# Patient Record
Sex: FEMALE | Race: ASIAN | Employment: OTHER | URBAN - METROPOLITAN AREA
[De-identification: names, ages, dates, MRNs, and addresses within clinical notes are randomized per-mention and may not be internally consistent; named-entity substitution may affect disease eponyms.]

---

## 2017-04-18 ENCOUNTER — APPOINTMENT (OUTPATIENT)
Dept: GENERAL RADIOLOGY | Facility: CLINIC | Age: 82
End: 2017-04-18
Attending: EMERGENCY MEDICINE
Payer: COMMERCIAL

## 2017-04-18 ENCOUNTER — HOSPITAL ENCOUNTER (EMERGENCY)
Facility: CLINIC | Age: 82
Discharge: HOME | End: 2017-04-18
Attending: EMERGENCY MEDICINE | Admitting: EMERGENCY MEDICINE
Payer: COMMERCIAL

## 2017-04-18 VITALS
HEART RATE: 70 BPM | TEMPERATURE: 97.8 F | DIASTOLIC BLOOD PRESSURE: 93 MMHG | SYSTOLIC BLOOD PRESSURE: 149 MMHG | RESPIRATION RATE: 18 BRPM | OXYGEN SATURATION: 99 %
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
## 2017-04-18 DIAGNOSIS — S20.212A CONTUSION OF RIB ON LEFT SIDE, INITIAL ENCOUNTER: ICD-10-CM

## 2017-04-18 PROCEDURE — 25000132 ZZH RX MED GY IP 250 OP 250 PS 637: Performed by: EMERGENCY MEDICINE

## 2017-04-18 PROCEDURE — 99283 EMERGENCY DEPT VISIT LOW MDM: CPT

## 2017-04-18 PROCEDURE — 71101 X-RAY EXAM UNILAT RIBS/CHEST: CPT | Mod: LT

## 2017-04-18 RX ORDER — LIDOCAINE 50 MG/G
1 PATCH TOPICAL ONCE
Status: COMPLETED | OUTPATIENT
Start: 2017-04-18 | End: 2017-04-18

## 2017-04-18 RX ORDER — HYDROCODONE BITARTRATE AND ACETAMINOPHEN 5; 325 MG/1; MG/1
1 TABLET ORAL ONCE
Status: COMPLETED | OUTPATIENT
Start: 2017-04-18 | End: 2017-04-18

## 2017-04-18 RX ORDER — HYDROCODONE BITARTRATE AND ACETAMINOPHEN 5; 325 MG/1; MG/1
1-2 TABLET ORAL EVERY 4 HOURS PRN
Qty: 15 TABLET | Refills: 0 | Status: SHIPPED | OUTPATIENT
Start: 2017-04-18 | End: 2019-01-01

## 2017-04-18 RX ADMIN — LIDOCAINE 1 PATCH: 50 PATCH CUTANEOUS at 18:16

## 2017-04-18 RX ADMIN — HYDROCODONE BITARTRATE AND ACETAMINOPHEN 1 TABLET: 5; 325 TABLET ORAL at 16:59

## 2017-04-18 ASSESSMENT — ENCOUNTER SYMPTOMS
BACK PAIN: 0
SHORTNESS OF BREATH: 0
NECK PAIN: 0

## 2017-04-18 NOTE — ED NOTES
Patient lives at [facility], EMS states that the toilet was over flowing and patient slipped on the wet floor and hit her left side of ribs on the bathtub. Hurts to take deep breaths and movementg, denies shortness of breath at this time.

## 2017-04-18 NOTE — ED NOTES
Bed: ED16  Expected date: 4/18/17  Expected time: 4:06 PM  Means of arrival: Ambulance  Comments:  Karen Canas-Room 16

## 2017-04-18 NOTE — ED PROVIDER NOTES
History     Chief Complaint:  Rib Pain      The history is provided by the patient.      Jatin Ayala is a 89 year old female who presents via EMS from her assisted living facility for evaluation of rib ain after a mechanical fall. The patient slipped on water in her bathroom this morning, striking her left ribs on the bathtub. The patient is now having left rib with with deep inspiration. She has not had anything for pain. She denies hitting her head. The patient has not had anything for pain. She does not report any other injuries from the fall. She denies any shortness of breath.     Allergies:  No known drug allergies.     Medications:    Pantoprazole Sodium (PROTONIX PO)  Losartan Potassium (COZAAR PO)  ASPIRIN PO  HYDROCHLOROTHIAZIDE PO  loratadine (CLARITIN) 10 MG tablet  acetaminophen (TYLENOL) 160 MG/5ML suspension  ACETAMINOPHEN PO  TRAMADOL HCL PO  beclomethasone (QVAR) 40 MCG/ACT Inhaler  Diclofenac Sodium (VOLTAREN PO)  calcium carbonate (TUMS) 500 MG chewable tablet     Past Medical History:    Hypertension  Diabetes   Hyperlipidemia  GERD    Past Surgical History:    No past surgical history on file.    Family History:    No family history on file.    Social History:  Marital Status:     Resident of assisted living facility    Presents to the ED with her daughter    Review of Systems   Respiratory: Negative for shortness of breath.    Musculoskeletal: Negative for back pain and neck pain.        Positive rib pain   All other systems reviewed and are negative.      Physical Exam     Patient Vitals for the past 24 hrs:   BP Temp Temp src Pulse Resp SpO2   04/18/17 1830 (!) 149/95 - - - - 97 %   04/18/17 1815 159/79 - - - - 97 %   04/18/17 1630 - - - - - 100 %   04/18/17 1626 157/75 97.8  F (36.6  C) Oral 70 18 95 %   04/18/17 1624 157/75 - - - - -      Physical Exam  Constitutional: Patient appears well-developed and well-nourished. There is mild distress.   Head: No external signs of trauma  noted.  Neck: No JVD noted  Eyes: Pupils are equal, round, and reactive to light.   Cardiovascular: Normal rate, regular rhythm and normal heart sounds.  Exam reveals no gallop and no friction rub.  No murmur heard. Normal peripheral pulses.  Pulmonary/Chest: Effort normal and breath sounds normal. No respiratory distress. Patient has no wheezes. Patient has no rales. There is left lateral chest wall pain  Abdominal: Soft. There is no tenderness.   Extremities: No edema noted  Neurological: Patient is alert and oriented to person, place, and time. No gross deficits noted.  Skin: Skin is warm and dry. There is no diaphoresis noted. No bruising noted.    Emergency Department Course     Imaging:  Radiographic findings were communicated with the patient who voiced understanding of the findings.    XR Left Ribs Unilateral with PA Chest  IMPRESSION: Unremarkable chest and left ribs.  Report per radiology.     Interventions:  (1659) Norco 5-325 mg PO  (1816) Lidoderm 5% patch    Emergency Department Course:  The patient arrived in the emergency department via EMS.  Nursing notes and vitals reviewed.  (1655) I performed an exam of the patient as documented above.    The patient was sent for a left ribs xray while in the emergency department, findings above.      (1928) Patient update. Findings and plan explained to the patient. Patient discharged home with instructions regarding supportive care, medications, and reasons to return. The importance of close follow-up was reviewed. The patient was prescribed Norco.      Impression & Plan      Medical Decision Making:  Jatin Ayala is a 89 year old female who presents to the Ed after a fall in the bathroom. She landed on her ribs but did not injure anything else. Xray of her ribs does not show any fracture. We sofia discharge the patient with a prescription for Norco. She already has Neurontin at home. The lidocaine patch that we tried in the ED did not help so we will not send her  home with that. I have encouraged the patient and daughter to ice the area, especially over the first 24 hours and after that they may alternate with heat as neccessary. anticipatory guidance given prior to discharge. The patient will also be sent home with an incentive spirometer and should follow up with her PCP.     Diagnosis:    ICD-10-CM   1. Fall, initial encounter W19.XXXA   2. Contusion of rib on left side, initial encounter S20.212A       Disposition:  Patient is discharged to home.     Discharge Medications:  New Prescriptions    HYDROCODONE-ACETAMINOPHEN (NORCO) 5-325 MG PER TABLET    Take 1-2 tablets by mouth every 4 hours as needed for moderate to severe pain         Compa Fisher  4/18/2017   [facility] EMERGENCY DEPARTMENT    I, Compa Fisher, am serving as a scribe on 4/18/2017 at 4:55 PM to personally document services performed by Dr. Troncoso based on my observations and the provider's statements to me.        Michele Troncoso,   04/18/17 8756

## 2017-04-18 NOTE — ED AVS SNAPSHOT
[facility] Emergency Department    [address] [facility] [address]    Phone:  [phone]    Fax:  [phone]                                       Jatin Ayala   MRN: [id number]    Department:  [facility] Emergency Department   Date of Visit:  4/18/2017           Patient Information     Date Of Birth          8/18/1927        Your diagnoses for this visit were:     Fall, initial encounter     Contusion of rib on left side, initial encounter        You were seen by Michele Troncoso DO.      Follow-up Information     Follow up with Juice Franco MD. Call in 2 days.    Specialty:  Internal Medicine    Why:  As needed    Contact information:    [facility]  [address] [facility] [address]  [phone]          Follow up with [facility] Emergency Department.    Specialty:  EMERGENCY MEDICINE    Why:  If symptoms worsen    Contact information:    [address] [facility] [id number] [phone]        Discharge Instructions         Discharge Instructions: Using an Incentive Spirometer  An incentive spirometer is a device that helps you do deep breathing exercises. These exercises will help you breathe better and improve the function of your lungs. The incentive spirometer provides a way for you to take an active part in your recovery.  Follow these steps to use your incentive spirometer     Inhale normally. Relax and breathe out.    Place your lips tightly around the mouthpiece.    Make sure the device is upright and not tilted.    Inhale as much air as you can through the mouthpiece (don't breathe through your nose). If you inhale too quickly, the spirometer may make a noise. If you hear this noise, inhale more slowly.    Hold your breath long enough to keep the balls (or disk) raised for at least 5 seconds.    Do this exercise every hour while you re awake or as often as your health care provider instructs.    If  you were also taught coughing exercises, perform them regularly as instructed.  Follow-up care  Make a follow-up appointment as directed by our staff.     When to seek medical care  Call your health care provider immediately if you have any of the following:    Shortness of breath that is not relieved by your medication    Chest pain    Fever above 101.0 F  (38.3 C)    Brownish, bloody, or smelly sputum    Blue lips or fingernails     4624-9889 [employer] [address] [place] [address]. All rights reserved. This information is not intended as a substitute for professional medical care. Always follow your healthcare professional's instructions.        Chest Contusion    A contusion is a bruise to the skin, muscle, or ribs. It may cause pain, tenderness, and swelling. It may turn the skin purple until it heals. Contusions take a few days to a few weeks to heal.  Home care  Follow these guidelines when caring for yourself at home:    Rest. Don t do any heavy lifting or strenuous activity. Don t do any activity that causes pain.    Put an ice pack on the injured area. Do this for 20 minutes every 1 to 2 hours the first day. You can make an ice pack by wrapping a plastic bag of ice cubes in a thin towel. Continue to use the ice pack 3 to 4 times a day for the next 2 days. Then use the ice pack as needed to ease pain and swelling.    After 1 to 2 days you may put a warm compress on the area. Do this for 10 minutes several times a day. A warm compress is a clean cloth that s damp with warm water.    Hold a pillow to the affected area when you cough. This will help ease pain.    You may use acetaminophen or ibuprofen to control pain, unless another pain medicine was prescribed. If you have chronic liver or kidney disease, talk with your health care provider before using these medicines. Also talk with your provider if you ve had a stomach ulcer or GI bleeding.  Follow-up care  Follow up with your  health care provider during the next week, or as advised.  When to seek medical advice  Call your health care provider right away if any of these occur:    Shortness of breath, difficulty breathing, or breathing fast    Chest pain gets worse when you breathe    Severe pain that comes on suddenly or lasts more than an hour    Dizziness, weakness, or fainting    New abdominal pain or abdominal pain that gets worse     Fever of 101 F (38.3 C) or higher, or as directed by your health care provider    2071-1791 [employer]. [address] [place] [address]. All rights reserved. This information is not intended as a substitute for professional medical care. Always follow your healthcare professional's instructions.          24 Hour Appointment Hotline       To make an appointment at any [facility], call [phone] ([phone]). If you don't have a family doctor or clinic, we will help you find one. [facility] clinics are conveniently located to serve the needs of you and your family.             Review of your medicines      START taking        Dose / Directions Last dose taken    HYDROcodone-acetaminophen 5-325 MG per tablet   Commonly known as:  NORCO   Dose:  1-2 tablet   Quantity:  15 tablet        Take 1-2 tablets by mouth every 4 hours as needed for moderate to severe pain   Refills:  0          Our records show that you are taking the medicines listed below. If these are incorrect, please call your family doctor or clinic.        Dose / Directions Last dose taken    * acetaminophen 160 MG/5ML suspension   Commonly known as:  TYLENOL   Dose:  15 mg/kg        Take 15 mg/kg by mouth every 6 hours as needed for fever or mild pain   Refills:  0        * ACETAMINOPHEN PO   Dose:  325 mg        Take 325 mg by mouth every 4 hours as needed for pain   Refills:  0        ASPIRIN NOT PRESCRIBED   Commonly known as:  INTENTIONAL        continuous prn for other Antiplatelet medication not  prescribed intentionally due to {CHOOSE REASON BEFORE SIGNING!!!:828503}   Refills:  0        ASPIRIN PO   Dose:  325 mg        Take 325 mg by mouth daily   Refills:  0        beclomethasone 40 MCG/ACT Inhaler   Commonly known as:  QVAR   Dose:  2 puff        Inhale 2 puffs into the lungs 2 times daily   Refills:  0        calcium carbonate 500 MG chewable tablet   Commonly known as:  TUMS   Dose:  1-2 chew tab   Quantity:  150 tablet        Take 1-2 tablets (500-1,000 mg) by mouth 3 times daily   Refills:  0        CLINDAMYCIN HCL PO   Dose:  150 mg        Take 150 mg by mouth   Refills:  0        COZAAR PO   Dose:  50 mg        Take 50 mg by mouth daily   Refills:  0        HYDROCHLOROTHIAZIDE PO   Dose:  25 mg        Take 25 mg by mouth daily   Refills:  0        loratadine 10 MG tablet   Commonly known as:  CLARITIN   Dose:  10 mg        Take 10 mg by mouth daily   Refills:  0        METRONIDAZOLE PO   Dose:  500 mg        Take 500 mg by mouth   Refills:  0        pantoprazole        Inject into the vein every 24 hours   Refills:  0        PROTONIX PO   Dose:  40 mg        Take 40 mg by mouth 2 times daily (before meals)   Refills:  0        TRAMADOL HCL PO   Dose:  50 mg        Take 50 mg by mouth   Refills:  0        VOLTAREN PO        Refills:  0        * Notice:  This list has 2 medication(s) that are the same as other medications prescribed for you. Read the directions carefully, and ask your doctor or other care provider to review them with you.            Prescriptions were sent or printed at these locations (1 Prescription)                   Other Prescriptions                Printed at Department/Unit printer (1 of 1)         HYDROcodone-acetaminophen (NORCO) 5-325 MG per tablet                Procedures and tests performed during your visit     Ribs XR, unilat 3 views + PA chest,  left      Orders Needing Specimen Collection     None      Pending Results     No orders found from 4/16/2017 to 4/19/2017.             Pending Culture Results     No orders found from 4/16/2017 to 4/19/2017.            Test Results From Your Hospital Stay        4/18/2017  7:22 PM      Narrative     CHEST AND LEFT RIBS THREE VIEWS   4/18/2017 7:09 PM    HISTORY: Left rib pain after falling.    COMPARISON: A chest radiograph and a CT of the chest on 3/31/2006.    FINDINGS: The heart size is normal. No mediastinal pathology is seen.  Again seen is a small granuloma of the left lung base. The lungs are  otherwise clear. The pulmonary vasculature is normal. No pneumothorax  or pleural effusion is seen. No rib fracture or other chest wall  pathology is seen.        Impression     IMPRESSION: Unremarkable chest and left ribs.    EDWARD GASPAR MD                Clinical Quality Measure: Blood Pressure Screening     Your blood pressure was checked while you were in the emergency department today. The last reading we obtained was  BP: (!) 149/95 . Please read the guidelines below about what these numbers mean and what you should do about them.  If your systolic blood pressure (the top number) is less than 120 and your diastolic blood pressure (the bottom number) is less than 80, then your blood pressure is normal. There is nothing more that you need to do about it.  If your systolic blood pressure (the top number) is 120-139 or your diastolic blood pressure (the bottom number) is 80-89, your blood pressure may be higher than it should be. You should have your blood pressure rechecked within a year by a primary care provider.  If your systolic blood pressure (the top number) is 140 or greater or your diastolic blood pressure (the bottom number) is 90 or greater, you may have high blood pressure. High blood pressure is treatable, but if left untreated over time it can put you at risk for heart attack, stroke, or kidney failure. You should have your blood pressure rechecked by a primary care provider within the next 4 weeks.  If your provider  "in the emergency department today gave you specific instructions to follow-up with your doctor or provider even sooner than that, you should follow that instruction and not wait for up to 4 weeks for your follow-up visit.        Thank you for choosing [facility]       Thank you for choosing [facility] for your care. Our goal is always to provide you with excellent care. Hearing back from our patients is one way we can continue to improve our services. Please take a few minutes to complete the written survey that you may receive in the mail after you visit with us. Thank you!        [employer] Information     [employer] lets you send messages to your doctor, view your test results, renew your prescriptions, schedule appointments and more. To sign up, go to [email or web address] . Click on \"Log in\" on the left side of the screen, which will take you to the Welcome page. Then click on \"Sign up Now\" on the right side of the page.     You will be asked to enter the access code listed below, as well as some personal information. Please follow the directions to create your username and password.     Your access code is: [email or web address]  Expires: 2017  7:41 PM     Your access code will  in 90 days. If you need help or a new code, please call your [facility] clinic or [phone].        Care EveryWhere ID     This is your Care EveryWhere ID. This could be used by other organizations to access your [facility] medical records  [id number]        After Visit Summary       This is your record. Keep this with you and show to your community pharmacist(s) and doctor(s) at your next visit.                  "

## 2017-04-18 NOTE — ED AVS SNAPSHOT
[facility] Emergency Department    [address]    [facility] [address]    Phone:  [phone]    Fax:  [phone]                                       Jatin Ayala   MRN: [id number]    Department:  [facility] Emergency Department   Date of Visit:  4/18/2017           After Visit Summary Signature Page     I have received my discharge instructions, and my questions have been answered. I have discussed any challenges I see with this plan with the nurse or doctor.    ..........................................................................................................................................  Patient/Patient Representative Signature      ..........................................................................................................................................  Patient Representative Print Name and Relationship to Patient    ..................................................               ................................................  Date                                            Time    ..........................................................................................................................................  Reviewed by Signature/Title    ...................................................              ..............................................  Date                                                            Time

## 2017-04-19 NOTE — DISCHARGE INSTRUCTIONS
Discharge Instructions: Using an Incentive Spirometer  An incentive spirometer is a device that helps you do deep breathing exercises. These exercises will help you breathe better and improve the function of your lungs. The incentive spirometer provides a way for you to take an active part in your recovery.  Follow these steps to use your incentive spirometer     Inhale normally. Relax and breathe out.    Place your lips tightly around the mouthpiece.    Make sure the device is upright and not tilted.    Inhale as much air as you can through the mouthpiece (don't breathe through your nose). If you inhale too quickly, the spirometer may make a noise. If you hear this noise, inhale more slowly.    Hold your breath long enough to keep the balls (or disk) raised for at least 5 seconds.    Do this exercise every hour while you re awake or as often as your health care provider instructs.    If you were also taught coughing exercises, perform them regularly as instructed.  Follow-up care  Make a follow-up appointment as directed by our staff.     When to seek medical care  Call your health care provider immediately if you have any of the following:    Shortness of breath that is not relieved by your medication    Chest pain    Fever above 101.0 F  (38.3 C)    Brownish, bloody, or smelly sputum    Blue lips or fingernails     7006-3401 [employer]. [address] [place] [address], [place] [id number]. All rights reserved. This information is not intended as a substitute for professional medical care. Always follow your healthcare professional's instructions.        Chest Contusion    A contusion is a bruise to the skin, muscle, or ribs. It may cause pain, tenderness, and swelling. It may turn the skin purple until it heals. Contusions take a few days to a few weeks to heal.  Home care  Follow these guidelines when caring for yourself at home:    Rest. Don t do any heavy lifting or strenuous activity. Don t do any  activity that causes pain.    Put an ice pack on the injured area. Do this for 20 minutes every 1 to 2 hours the first day. You can make an ice pack by wrapping a plastic bag of ice cubes in a thin towel. Continue to use the ice pack 3 to 4 times a day for the next 2 days. Then use the ice pack as needed to ease pain and swelling.    After 1 to 2 days you may put a warm compress on the area. Do this for 10 minutes several times a day. A warm compress is a clean cloth that s damp with warm water.    Hold a pillow to the affected area when you cough. This will help ease pain.    You may use acetaminophen or ibuprofen to control pain, unless another pain medicine was prescribed. If you have chronic liver or kidney disease, talk with your health care provider before using these medicines. Also talk with your provider if you ve had a stomach ulcer or GI bleeding.  Follow-up care  Follow up with your health care provider during the next week, or as advised.  When to seek medical advice  Call your health care provider right away if any of these occur:    Shortness of breath, difficulty breathing, or breathing fast    Chest pain gets worse when you breathe    Severe pain that comes on suddenly or lasts more than an hour    Dizziness, weakness, or fainting    New abdominal pain or abdominal pain that gets worse     Fever of 101 F (38.3 C) or higher, or as directed by your health care provider    7943-9704 [employer]. [address] [place] [address]. All rights reserved. This information is not intended as a substitute for professional medical care. Always follow your healthcare professional's instructions.

## 2019-01-01 ENCOUNTER — TELEPHONE (OUTPATIENT)
Dept: FAMILY MEDICINE | Facility: CLINIC | Age: 84
End: 2019-01-01

## 2019-01-01 ENCOUNTER — OFFICE VISIT (OUTPATIENT)
Dept: OPHTHALMOLOGY | Facility: CLINIC | Age: 84
End: 2019-01-01
Payer: COMMERCIAL

## 2019-01-01 ENCOUNTER — ANCILLARY PROCEDURE (OUTPATIENT)
Dept: GENERAL RADIOLOGY | Facility: CLINIC | Age: 84
End: 2019-01-01
Attending: FAMILY MEDICINE
Payer: COMMERCIAL

## 2019-01-01 ENCOUNTER — OFFICE VISIT (OUTPATIENT)
Dept: ORTHOPEDICS | Facility: CLINIC | Age: 84
End: 2019-01-01
Attending: FAMILY MEDICINE
Payer: COMMERCIAL

## 2019-01-01 ENCOUNTER — ANCILLARY PROCEDURE (OUTPATIENT)
Dept: GENERAL RADIOLOGY | Facility: CLINIC | Age: 84
End: 2019-01-01
Attending: PODIATRIST
Payer: COMMERCIAL

## 2019-01-01 ENCOUNTER — OFFICE VISIT (OUTPATIENT)
Dept: FAMILY MEDICINE | Facility: CLINIC | Age: 84
End: 2019-01-01
Payer: COMMERCIAL

## 2019-01-01 ENCOUNTER — TELEPHONE (OUTPATIENT)
Dept: FAMILY MEDICINE | Facility: CLINIC | Age: 84
End: 2019-01-01
Payer: COMMERCIAL

## 2019-01-01 ENCOUNTER — PRE VISIT (OUTPATIENT)
Dept: ORTHOPEDICS | Facility: CLINIC | Age: 84
End: 2019-01-01

## 2019-01-01 VITALS
OXYGEN SATURATION: 100 % | RESPIRATION RATE: 16 BRPM | BODY MASS INDEX: 20.06 KG/M2 | SYSTOLIC BLOOD PRESSURE: 158 MMHG | DIASTOLIC BLOOD PRESSURE: 88 MMHG | TEMPERATURE: 97.5 F | HEIGHT: 57 IN | WEIGHT: 93 LBS | HEART RATE: 88 BPM

## 2019-01-01 VITALS
HEIGHT: 57 IN | BODY MASS INDEX: 21.45 KG/M2 | DIASTOLIC BLOOD PRESSURE: 78 MMHG | RESPIRATION RATE: 16 BRPM | HEART RATE: 89 BPM | TEMPERATURE: 98.4 F | WEIGHT: 99.4 LBS | OXYGEN SATURATION: 100 % | SYSTOLIC BLOOD PRESSURE: 137 MMHG

## 2019-01-01 VITALS
OXYGEN SATURATION: 97 % | RESPIRATION RATE: 16 BRPM | TEMPERATURE: 98 F | SYSTOLIC BLOOD PRESSURE: 135 MMHG | DIASTOLIC BLOOD PRESSURE: 74 MMHG | HEART RATE: 75 BPM | BODY MASS INDEX: 19.71 KG/M2 | WEIGHT: 92.6 LBS

## 2019-01-01 DIAGNOSIS — I10 ESSENTIAL HYPERTENSION: Primary | ICD-10-CM

## 2019-01-01 DIAGNOSIS — M25.512 ACUTE PAIN OF LEFT SHOULDER: Primary | ICD-10-CM

## 2019-01-01 DIAGNOSIS — F33.0 MILD RECURRENT MAJOR DEPRESSION (H): ICD-10-CM

## 2019-01-01 DIAGNOSIS — L97.523: Primary | ICD-10-CM

## 2019-01-01 DIAGNOSIS — H01.01A ULCERATIVE BLEPHARITIS OF UPPER AND LOWER EYELIDS OF BOTH EYES: ICD-10-CM

## 2019-01-01 DIAGNOSIS — M54.50 CHRONIC BILATERAL LOW BACK PAIN WITHOUT SCIATICA: Primary | ICD-10-CM

## 2019-01-01 DIAGNOSIS — N18.30 CKD (CHRONIC KIDNEY DISEASE) STAGE 3, GFR 30-59 ML/MIN (H): ICD-10-CM

## 2019-01-01 DIAGNOSIS — M25.561 CHRONIC PAIN OF BOTH KNEES: ICD-10-CM

## 2019-01-01 DIAGNOSIS — H01.01B ULCERATIVE BLEPHARITIS OF UPPER AND LOWER EYELIDS OF BOTH EYES: ICD-10-CM

## 2019-01-01 DIAGNOSIS — L97.523: ICD-10-CM

## 2019-01-01 DIAGNOSIS — M21.612 BUNION, LEFT: ICD-10-CM

## 2019-01-01 DIAGNOSIS — G89.29 CHRONIC PAIN OF BOTH KNEES: ICD-10-CM

## 2019-01-01 DIAGNOSIS — M25.512 ACUTE PAIN OF LEFT SHOULDER: ICD-10-CM

## 2019-01-01 DIAGNOSIS — G89.29 CHRONIC BILATERAL LOW BACK PAIN WITHOUT SCIATICA: Primary | ICD-10-CM

## 2019-01-01 DIAGNOSIS — H52.13 MYOPIA OF BOTH EYES: Primary | ICD-10-CM

## 2019-01-01 DIAGNOSIS — J30.89 NON-SEASONAL ALLERGIC RHINITIS, UNSPECIFIED TRIGGER: ICD-10-CM

## 2019-01-01 DIAGNOSIS — M25.562 CHRONIC PAIN OF BOTH KNEES: ICD-10-CM

## 2019-01-01 DIAGNOSIS — K59.00 CONSTIPATION, UNSPECIFIED CONSTIPATION TYPE: ICD-10-CM

## 2019-01-01 DIAGNOSIS — H35.30 AGE-RELATED MACULAR DEGENERATION: ICD-10-CM

## 2019-01-01 DIAGNOSIS — H04.123 INSUFFICIENCY OF TEAR FILM OF BOTH EYES: ICD-10-CM

## 2019-01-01 DIAGNOSIS — Z96.1 PSEUDOPHAKIA: ICD-10-CM

## 2019-01-01 DIAGNOSIS — E61.1 IRON DEFICIENCY: ICD-10-CM

## 2019-01-01 DIAGNOSIS — K21.9 GASTROESOPHAGEAL REFLUX DISEASE WITHOUT ESOPHAGITIS: ICD-10-CM

## 2019-01-01 DIAGNOSIS — I10 ESSENTIAL HYPERTENSION: ICD-10-CM

## 2019-01-01 DIAGNOSIS — E55.9 VITAMIN D DEFICIENCY: ICD-10-CM

## 2019-01-01 LAB
ALBUMIN SERPL-MCNC: 4.6 MG/DL (ref 3.5–4.7)
ALP SERPL-CCNC: 78.4 U/L (ref 31.7–110.5)
ALT SERPL-CCNC: 9.3 U/L (ref 0–45)
AST SERPL-CCNC: 18.2 U/L (ref 0–45)
BILIRUB SERPL-MCNC: 0.6 MG/DL (ref 0.2–1.3)
BUN SERPL-MCNC: 22.2 MG/DL (ref 7–19)
CALCIUM SERPL-MCNC: 9.6 MG/DL (ref 8.5–10.1)
CHLORIDE SERPLBLD-SCNC: 96.4 MMOL/L (ref 98–110)
CO2 SERPL-SCNC: 26.5 MMOL/L (ref 20–32)
CREAT SERPL-MCNC: 0.9 MG/DL (ref 0.5–1)
DEPRECATED CALCIDIOL+CALCIFEROL SERPL-MC: 60 UG/L (ref 20–75)
GFR SERPL CREATININE-BSD FRML MDRD: 62.2 ML/MIN/1.7 M2
GLUCOSE SERPL-MCNC: 113.8 MG'DL (ref 70–99)
HCT VFR BLD AUTO: 41.1 % (ref 35–47)
HEMOGLOBIN: 12.4 G/DL (ref 11.7–15.7)
MCH RBC QN AUTO: 30.8 PG (ref 26.5–35)
MCHC RBC AUTO-ENTMCNC: 30.2 G/DL (ref 32–36)
MCV RBC AUTO: 102.2 FL (ref 78–100)
PLATELET # BLD AUTO: 297 K/UL (ref 150–450)
POTASSIUM SERPL-SCNC: 3.7 MMOL/DL (ref 3.3–4.5)
PROT SERPL-MCNC: 7.7 G/DL (ref 6.8–8.8)
RBC # BLD AUTO: 4.02 M/UL (ref 3.8–5.2)
SODIUM SERPL-SCNC: 133.4 MMOL/L (ref 132.6–141.4)
WBC # BLD AUTO: 6.6 K/UL (ref 4–11)

## 2019-01-01 RX ORDER — AMLODIPINE BESYLATE 10 MG/1
10 TABLET ORAL DAILY
COMMUNITY
Start: 2019-10-29 | End: 2019-01-01

## 2019-01-01 RX ORDER — SERTRALINE HYDROCHLORIDE 25 MG/1
25 TABLET, FILM COATED ORAL DAILY
Qty: 90 TABLET | Refills: 3 | Status: SHIPPED | OUTPATIENT
Start: 2019-01-01 | End: 2020-01-01

## 2019-01-01 RX ORDER — POLYETHYLENE GLYCOL 3350 17 G/17G
1 POWDER, FOR SOLUTION ORAL DAILY
Qty: 850 G | Refills: 3 | Status: ON HOLD | OUTPATIENT
Start: 2019-01-01 | End: 2020-01-01

## 2019-01-01 RX ORDER — FERROUS GLUCONATE 324(38)MG
1 TABLET ORAL 2 TIMES DAILY
COMMUNITY
Start: 2019-06-10 | End: 2019-01-01

## 2019-01-01 RX ORDER — ERGOCALCIFEROL 1.25 MG/1
50000 CAPSULE, LIQUID FILLED ORAL
COMMUNITY
Start: 2019-08-14 | End: 2019-01-01

## 2019-01-01 RX ORDER — LOSARTAN POTASSIUM 50 MG/1
50 TABLET ORAL DAILY
Qty: 90 TABLET | Refills: 3 | Status: ON HOLD | OUTPATIENT
Start: 2019-01-01 | End: 2020-01-01

## 2019-01-01 RX ORDER — ACETAMINOPHEN 500 MG
1000 TABLET ORAL EVERY 8 HOURS PRN
Qty: 100 TABLET | Refills: 3 | Status: SHIPPED | OUTPATIENT
Start: 2019-01-01 | End: 2020-01-01

## 2019-01-01 RX ORDER — DIPHENHYDRAMINE HCL 25 MG
CAPSULE ORAL
Qty: 30 ML | Refills: 3 | Status: SHIPPED | OUTPATIENT
Start: 2019-01-01 | End: 2020-01-01

## 2019-01-01 RX ORDER — TRIAMCINOLONE ACETONIDE 1 MG/G
CREAM TOPICAL
COMMUNITY
Start: 2018-03-16 | End: 2019-01-01

## 2019-01-01 RX ORDER — POLYETHYLENE GLYCOL 3350 17 G/17G
POWDER, FOR SOLUTION ORAL
COMMUNITY
Start: 2018-10-04 | End: 2019-01-01

## 2019-01-01 RX ORDER — CICLOPIROX 80 MG/ML
SOLUTION TOPICAL
COMMUNITY
Start: 2017-12-28 | End: 2019-01-01

## 2019-01-01 RX ORDER — LANSOPRAZOLE 30 MG/1
30 CAPSULE, DELAYED RELEASE ORAL DAILY
Qty: 90 CAPSULE | Refills: 3 | Status: ON HOLD | OUTPATIENT
Start: 2019-01-01 | End: 2020-01-01

## 2019-01-01 RX ORDER — FAMOTIDINE 40 MG/1
40 TABLET, FILM COATED ORAL DAILY
Qty: 90 TABLET | Refills: 3 | Status: SHIPPED | OUTPATIENT
Start: 2019-01-01 | End: 2020-01-01

## 2019-01-01 RX ORDER — DIPHENHYDRAMINE HCL 25 MG
CAPSULE ORAL
Refills: 8 | COMMUNITY
Start: 2019-07-30 | End: 2019-01-01

## 2019-01-01 RX ORDER — ANTIOX #8/OM3/DHA/EPA/LUT/ZEAX 250-2.5 MG
1 CAPSULE ORAL 2 TIMES DAILY
Qty: 120 CAPSULE | Refills: 11 | Status: ON HOLD | OUTPATIENT
Start: 2019-01-01 | End: 2020-01-01

## 2019-01-01 RX ORDER — BETAMETHASONE VALERATE 1.2 MG/G
CREAM TOPICAL
Refills: 1 | COMMUNITY
Start: 2019-01-16 | End: 2019-01-01

## 2019-01-01 RX ORDER — LORATADINE 10 MG/1
10 TABLET ORAL DAILY
Qty: 90 TABLET | Refills: 3 | Status: ON HOLD | OUTPATIENT
Start: 2019-01-01 | End: 2020-01-01

## 2019-01-01 RX ORDER — SERTRALINE HYDROCHLORIDE 25 MG/1
25 TABLET, FILM COATED ORAL DAILY
Refills: 0 | COMMUNITY
Start: 2019-01-01 | End: 2019-01-01

## 2019-01-01 RX ORDER — AMLODIPINE BESYLATE 10 MG/1
10 TABLET ORAL EVERY EVENING
Qty: 90 TABLET | Refills: 3 | Status: ON HOLD | OUTPATIENT
Start: 2019-01-01 | End: 2020-01-01

## 2019-01-01 RX ORDER — HYDROCHLOROTHIAZIDE 25 MG/1
25 TABLET ORAL DAILY
Qty: 90 TABLET | Refills: 3 | Status: ON HOLD | OUTPATIENT
Start: 2019-01-01 | End: 2020-01-01

## 2019-01-01 RX ORDER — LANSOPRAZOLE 30 MG/1
30 CAPSULE, DELAYED RELEASE ORAL DAILY
Refills: 1 | COMMUNITY
Start: 2019-10-18 | End: 2019-01-01

## 2019-01-01 ASSESSMENT — VISUAL ACUITY
OS_CC: 20/30
CORRECTION_TYPE: GLASSES
OD_CC: J3
OS_CC: J3
OD_CC: 20/40
OS_CC+: -1
METHOD: SNELLEN - LINEAR
OD_CC+: -1

## 2019-01-01 ASSESSMENT — ENCOUNTER SYMPTOMS
NUMBNESS: 0
WEAKNESS: 1
PARALYSIS: 0
HEADACHES: 0
STIFFNESS: 0
SPEECH CHANGE: 0
BACK PAIN: 1
DISTURBANCES IN COORDINATION: 1
MEMORY LOSS: 1
DIZZINESS: 1
ARTHRALGIAS: 1
NECK PAIN: 0
TINGLING: 0
TREMORS: 0
MUSCLE WEAKNESS: 1
SEIZURES: 0
MUSCLE CRAMPS: 1
LOSS OF CONSCIOUSNESS: 0
MYALGIAS: 1
JOINT SWELLING: 1

## 2019-01-01 ASSESSMENT — REFRACTION_MANIFEST
OS_CYLINDER: SPHERE
OD_CYLINDER: +0.50
OS_SPHERE: -1.00
OS_ADD: +3.00
OD_ADD: +3.00
OD_SPHERE: -1.75
OD_AXIS: 167

## 2019-01-01 ASSESSMENT — REFRACTION_WEARINGRX
OS_ADD: +3.00
OS_AXIS: 175
OS_CYLINDER: +0.50
OS_SPHERE: -1.50
OD_CYLINDER: +0.50
OD_ADD: +3.00
OD_SPHERE: -1.75
OD_AXIS: 180
SPECS_TYPE: BIFOCAL

## 2019-01-01 ASSESSMENT — TONOMETRY
OS_IOP_MMHG: 8
IOP_METHOD: ICARE
OD_IOP_MMHG: 9

## 2019-01-01 ASSESSMENT — CONF VISUAL FIELD
OS_NORMAL: 1
OD_NORMAL: 1
METHOD: COUNTING FINGERS

## 2019-01-01 ASSESSMENT — CUP TO DISC RATIO
OS_RATIO: 0.6
OD_RATIO: 0.6

## 2019-01-01 ASSESSMENT — MIFFLIN-ST. JEOR
SCORE: 707.11
SCORE: 742.38

## 2019-01-01 ASSESSMENT — EXTERNAL EXAM - LEFT EYE: OS_EXAM: NORMAL

## 2019-01-01 ASSESSMENT — PATIENT HEALTH QUESTIONNAIRE - PHQ9: SUM OF ALL RESPONSES TO PHQ QUESTIONS 1-9: 2

## 2019-01-01 ASSESSMENT — PAIN SCALES - GENERAL: PAINLEVEL: EXTREME PAIN (8)

## 2019-01-01 ASSESSMENT — SLIT LAMP EXAM - LIDS
COMMENTS: NORMAL
COMMENTS: NORMAL

## 2019-01-01 ASSESSMENT — EXTERNAL EXAM - RIGHT EYE: OD_EXAM: NORMAL

## 2019-11-08 PROBLEM — G89.29 CHRONIC LOW BACK PAIN WITHOUT SCIATICA: Status: ACTIVE | Noted: 2017-04-07

## 2019-11-08 PROBLEM — M54.50 CHRONIC LOW BACK PAIN WITHOUT SCIATICA: Status: ACTIVE | Noted: 2017-04-07

## 2019-11-08 NOTE — PATIENT INSTRUCTIONS
1. Follow-up with me in 1 month  2. Referral to Podiatry   3. Labs drawn today, will review results at follow-up visit

## 2019-11-08 NOTE — PROGRESS NOTES
"New Patient Note-Women         HPI       Concerns today:   1. Itching: spots on backs of hands and face. Generalized itching but then developed red spots after. No pain. Was told it was from \"too much stress\" - dry palms and tips of fingers. Has been using moisturizing \"medical\" lotion.   - patient reports having \"stones in belly\"   - reports having ulcer in stomach, denies it ever bleeding   2. Shingles vaccine  3. GERD: note from nurse at facility (Yuliana Alvares) is concerned GERD is not fully resolved with current regimen (lansoprazole 30mg daily, ranitidine 300mg daily), patient has been taking more medicine     Was in [place], moved to [place] - used to live alone, now in assisted living (has adult daughters, three, adult - one in [place] and two here in [place])   - \"has so many things in my mind\"  - \"worried a lot, depressed\"    A Polish  was used for  this visit.     Patient Active Problem List   Diagnosis     Allergic rhinitis     Anxiety state     Chronic low back pain without sciatica     Chronic pain of both knees     CKD (chronic kidney disease) stage 3, GFR 30-59 ml/min (H)     Gastro-esophageal reflux disease without esophagitis     HTN (hypertension)     Hyperlipidemia     Late effects of cerebrovascular disease     Mild recurrent major depression (H)     Osteoporosis     Primary osteoarthritis of both knees     Tear film insufficiency     Vitamin D deficiency     Past Medical History:   Diagnosis Date     Gastroesophageal reflux disease      Hypertension      Previous Medical Care   Karen Decker   acupuncture      History reviewed. No pertinent family history.         Review of Systems:     Review of Systems:  CONSTITUTIONAL: NEGATIVE for fever, chills, change in weight  INTEGUMENTARY/SKIN: NEGATIVE for current worrisome rashes, moles or lesions  EYES: NEGATIVE for vision changes or irritation  ENT/MOUTH: NEGATIVE for ear, mouth and throat problems  RESP: NEGATIVE for " "significant cough or SOB  BREAST: NEGATIVE for masses, tenderness or discharge  CV: NEGATIVE for chest pain, palpitations or peripheral edema  GI: NEGATIVE for nausea, abdominal pain, heartburn, or change in bowel habits  : NEGATIVE for frequency, dysuria, or hematuria  MUSCULOSKELETAL: NEGATIVE for significant arthralgias or myalgia  NEURO: NEGATIVE for weakness, dizziness or paresthesias  ENDOCRINE: NEGATIVE for temperature intolerance, skin/hair changes  HEME/ALLERGY: NEGATIVE for bleeding problems  PSYCHIATRIC: NEGATIVE for changes in mood or affect         Social History     Social History     Tobacco Use     Smoking status: Never Smoker     Smokeless tobacco: Never Used   Substance Use Topics     Alcohol use: Not Currently     Marital Status:  Who lives in your household? Moved to assisted living    Has anyone hurt you physically, for example by pushing, hitting, slapping or kicking you or forcing you to have sex? Denies  Do you feel threatened or controlled by a partner, ex-partner or anyone in your life? Denies    Sexual Health     Sexual concerns: No   STI History: Neg  Pregnancy History: No obstetric history on file.  LMP No LMP recorded. Patient is postmenopausal. Menopausal         Physical Exam:     Vitals: /78 (BP Location: Left arm, Patient Position: Sitting, Cuff Size: Adult Small)   Pulse 89   Temp 98.4  F (36.9  C) (Oral)   Resp 16   Ht 1.46 m (4' 9.48\")   Wt 45.1 kg (99 lb 6.4 oz)   SpO2 100%   BMI 21.15 kg/m    BMI= Body mass index is 21.15 kg/m .     Physical Exam  Vitals signs reviewed.   Constitutional:       General: She is not in acute distress.     Appearance: She is not ill-appearing, toxic-appearing or diaphoretic.      Comments: Thin   HENT:      Nose: Nose normal.      Mouth/Throat:      Mouth: Mucous membranes are moist.      Pharynx: Oropharynx is clear.   Eyes:      Conjunctiva/sclera: Conjunctivae normal.   Cardiovascular:      Rate and Rhythm: Normal rate and " regular rhythm.      Pulses: Normal pulses.   Pulmonary:      Effort: Pulmonary effort is normal.      Breath sounds: Normal breath sounds.   Abdominal:      General: Abdomen is flat. There is no distension.      Palpations: Abdomen is soft.      Tenderness: There is no abdominal tenderness.   Neurological:      Mental Status: She is alert. Mental status is at baseline.   Psychiatric:         Mood and Affect: Mood normal.         Behavior: Behavior normal.         Thought Content: Thought content normal.         Judgment: Judgment normal.         Assessment and Plan      Jatin was seen today for establish care and referral.    Diagnoses and all orders for this visit:    Chronic bilateral low back pain without sciatica  -     diclofenac (VOLTAREN) 1 % topical gel; Place 2 g onto the skin 4 times daily as needed for moderate pain Apply to knees, hips, and shoulders  -     acetaminophen (TYLENOL) 500 MG tablet; Take 2 tablets (1,000 mg) by mouth every 8 hours as needed for pain    Chronic pain of both knees  -     diclofenac (VOLTAREN) 1 % topical gel; Place 2 g onto the skin 4 times daily as needed for moderate pain Apply to knees, hips, and shoulders  -     acetaminophen (TYLENOL) 500 MG tablet; Take 2 tablets (1,000 mg) by mouth every 8 hours as needed for pain    Bunion, left  -     PODIATRY/FOOT & ANKLE SURGERY REFERRAL - INTERNAL    Vitamin D deficiency  -     Vitamin D Level    Insufficiency of tear film of both eyes  -     HYPROMELLOSE-DEXTRAN 0.3-0.1% opthalmic solution; INSTILL TWO DROPS INTO BOTH EYES EVERY 6 HOURS IF NEEDED FOR DRY EYES.    Mild recurrent major depression (H)  -     sertraline (ZOLOFT) 25 MG tablet; Take 1 tablet (25 mg) by mouth daily    Non-seasonal allergic rhinitis, unspecified trigger  -     loratadine (CLARITIN) 10 MG tablet; Take 1 tablet (10 mg) by mouth daily    Essential hypertension  -     amLODIPine (NORVASC) 10 MG tablet; Take 1 tablet (10 mg) by mouth every evening  -      hydrochlorothiazide (HYDRODIURIL) 25 MG tablet; Take 1 tablet (25 mg) by mouth daily  -     losartan (COZAAR) 50 MG tablet; Take 1 tablet (50 mg) by mouth daily    Gastroesophageal reflux disease without esophagitis  -     famotidine (PEPCID) 40 MG tablet; Take 1 tablet (40 mg) by mouth daily  -     LANsoprazole (PREVACID) 30 MG DR capsule; Take 1 capsule (30 mg) by mouth daily    Constipation, unspecified constipation type  -     polyethylene glycol (MIRALAX/GLYCOLAX) powder; Take 17 g (1 capful) by mouth daily    Iron deficiency  -     CBC with Plt ([facility])    CKD (chronic kidney disease) stage 3, GFR 30-59 ml/min (H)  -     Comprehensive Metabolic Panel ([facility])      Follow-up in 1 month  Provided referral to podiatry, given bunion and recent referral in [facility] system, but needs within [facility] at this time.  Labs drawn today - plan to review results with patient at follow-up, or if urgent will call with results.    Options for treatment and follow-up care were reviewed with the patient . Jatin Ayala  engaged in the decision making process and verbalized understanding of the options discussed and agreed with the final plan.    Fernanda Light, DO

## 2019-11-08 NOTE — NURSING NOTE
Due to patient being non-English speaking/uses sign language, an  was used for this visit. Only for face-to-face interpretation by an external agency, date and length of interpretation can be found on the scanned worksheet.     name: Shakira Ly   Agency: [employer]  Language: Telugu     Telephone number: [phone]  Type of interpretation: Face-to-face, spoken    Mary Lou Aguilar CMA on 11/8/2019 at 1:20 PM

## 2019-11-08 NOTE — PROGRESS NOTES
Preceptor Attestation:   Patient seen, evaluated and discussed with the resident. I have verified the content of the note, which accurately reflects my assessment of the patient and the plan of care.   Supervising Physician:  Mark Cummings MD.

## 2019-11-11 NOTE — TELEPHONE ENCOUNTER
Returned phone call and spoke with pharmacist (Des[facility]), per pharmacist, patient  was on different percentage of similar class of eyes drops, considering, this HYPROMELLOSE-DEXTRAN 0.3-0.1% opthalmic solution to be contemporary, since, it is the most prescription order.    Message routed to prescribing provider as Fyi or additional advisement if appropriate.    Kevin Santoyo RN

## 2019-11-11 NOTE — TELEPHONE ENCOUNTER
[facility] phone call message- medication clarification/question:    Full Medication Name: HYPROMELLOSE-DEXTRAN 0.3-0.1% opthalmic solution   Dose: INSTILL TWO DROPS INTO BOTH EYES EVERY 6 HOURS IF NEEDED FOR DRY EYES.    Question/Clarification needed: Pharmacy calling to clarify directions of medication listed above.    Pharmacy confirmed as   [id number] - [place], [place] - [address]  [address]  [address]  [facility] [address]  Phone: [phone] Fax: [phone]  : Yes    Please leave ONLY preferred pharmacy    OK to leave a message on voice mail? Yes    Advised patient that RN would call back within 3 hours, unless emergent.    Primary language: Greenlandic      needed? Yes    Call taken on November 11, 2019 at 10:12 AM by Jenifer Moreno to [facility]

## 2019-11-19 NOTE — TELEPHONE ENCOUNTER
DIAGNOSIS: Bunion LT toe - Internal referral Kuldeep GARCIA    APPOINTMENT DATE: 12/30/19    NOTES STATUS DETAILS   OFFICE NOTE from referring provider Internal Referral note 11/18/19    OFFICE NOTE from other specialist N/A    DISCHARGE SUMMARY from hospital N/A    DISCHARGE REPORT from the ER N/A    OPERATIVE REPORT N/A    MEDICATION LIST N/A    IMPLANT RECORD/STICKER N/A    LABS     CBC/DIFF Internal    CULTURES N/A    INJECTIONS DONE IN RADIOLOGY N/A    MRI N/A    CT SCAN N/A    XRAYS (IMAGES & REPORTS) N/A    TUMOR     PATHOLOGY  Slides & report N/A

## 2019-12-05 NOTE — PROGRESS NOTES
Preceptor Attestation:   Patient seen and discussed with the resident. Assessment and plan reviewed with resident and agreed upon.   Supervising Physician:  Bernard Choudhary MD  [facility]

## 2019-12-05 NOTE — PROGRESS NOTES
SAVAGE Ayala is a 89 year old  who presents for   Chief Complaint   Patient presents with     RECHECK     heartburn- medication is helping per pt.      Cough     notices a cough after eating food and has had a hard time swallowing her foot x's 2 months. Reports being seen for it in the past and nothing was wrong.      Exercises every day before bed - lots of exercises to strengthen leg and arm. Leg pain is better after doing exercises, no longer needs injection at this time.    At last visit was prescribed pepcid, which is working well. Nurse reports patient doing well. Is at facility ([facility]) and enjoys it, likes the food. 90% of self is happy, other 10% is not happy because there is no bathtub at the facility. Sleeping, eating, people - all good. Feels God found it for her.    Podiatry appointment on 12/30 - has had pain, but been doing salt soaking which is helping with the pain in the mean time. Denies any calluses or difficulty cutting toenails.    A Kazakh  was used for  this visit.    +++++++    Problem, Medication and Allergy Lists were reviewed and updated if needed..    Patient is an established patient of this clinic..         Review of Systems:   Review of Systems         Physical Exam:     Vitals:    12/05/19 1433 12/05/19 1435   BP: (!) 144/73 135/74   Pulse: 75    Resp: 16    Temp: 98  F (36.7  C)    TempSrc: Oral    SpO2: 97%    Weight: 42 kg (92 lb 9.6 oz)      Body mass index is 19.71 kg/m .  Vitals were reviewed and were normal, except initial BP elevated - on recheck is WNL     Physical Exam  Vitals signs reviewed.   Constitutional:       General: She is not in acute distress.     Appearance: She is not ill-appearing, toxic-appearing or diaphoretic.   Eyes:      Conjunctiva/sclera: Conjunctivae normal.   Cardiovascular:      Rate and Rhythm: Normal rate and regular rhythm.   Pulmonary:      Effort: Pulmonary effort is normal.      Breath sounds: Normal breath  sounds.   Neurological:      Mental Status: She is alert. Mental status is at baseline.   Psychiatric:         Mood and Affect: Mood normal.         Behavior: Behavior normal.         Thought Content: Thought content normal.         Judgment: Judgment normal.           Results:   No testing ordered today    Assessment and Plan        1. Essential hypertension  BP WNL today, recommend continuing current regimen of amlodipine 10mg daily, hydrochlorothiazide 25mg daily, losartan 50mg daily.     2. CKD (chronic kidney disease) stage 3, GFR 30-59 ml/min (H)  Reviewed recent labs with patient, Cr within normal limits at 0.9.     3. Bunion, left  Discussed with patient about need for podiatry follow-up, which is scheduled 12/30 with Dr. Carrion. Examined feet today - no significant callous or long toenails to warrant treating prior to 12/30. Recommended she continue with her bandaid barrier between her first two toes on the left while wearing shoes to prevent further damage/ulceration from bunion.    Follow-up in 3 months.     There are no discontinued medications.    Options for treatment and follow-up care were reviewed with the patient. Jatin Ayala  engaged in the decision making process and verbalized understanding of the options discussed and agreed with the final plan.    Fernanda Light, DO

## 2019-12-05 NOTE — NURSING NOTE
Due to patient being non-English speaking/uses sign language, an  was used for this visit. Only for face-to-face interpretation by an external agency, date and length of interpretation can be found on the scanned worksheet.     name: Shakira Ly   Agency: [employer]  Language: Serbian     Telephone number: [phone]  Type of interpretation: Face-to-face, spoken    Reyna Valentine CMA

## 2019-12-06 NOTE — TELEPHONE ENCOUNTER
"Request for medication refill:    Unable to fill Famotidone, currently on backorder with no release date. Please change medication to alternate if possible.    Providers if patient needs an appointment and you are willing to give a one month supply please refill for one month and  send a letter/MyChart using \".SMILLIMITEDREFILL\" .smillimited and route chart to \"P SMI \" (Giving one month refill in non controlled medications is strongly recommended before denial)    If refill has been denied, meaning absolutely no refills without visit, please complete the smart phrase \".smirxrefuse\" and route it to the \"P SMI MED REFILLS\"  pool to inform the patient and the pharmacy.    SANCHEZ SPARKS MA        "

## 2019-12-11 NOTE — NURSING NOTE
Chief Complaints and History of Present Illnesses   Patient presents with     COMPREHENSIVE EYE EXAM     Chief Complaint(s) and History of Present Illness(es)     COMPREHENSIVE EYE EXAM     Laterality: both eyes    Frequency: constantly    Course: gradually worsening    Associated symptoms: dryness    Treatments tried: artificial tears    Pain scale: 0/10              Comments     Annual exam. Pt states she cannot see well. Last eye exam 1 year ago- glasses may be older. No pain. Refresh eye drops. Pt has really dry eyes.    Jose Pollard, COT COT 1:47 PM December 11, 2019

## 2019-12-11 NOTE — Clinical Note
Thank you for the referral.Intermediate age-related macular degeneration noted in both eyes.I would like to recommend AREDs 2 multivitamin for her, but given her age and other medical issues I wanted to send you a message first before adding any systemic therapy.Please let me know if you are ok with adding this supplement.Recommended repeat evaluation in 1 year.Please contact me with any questions.Jan Allen, OD on 12/11/2019 at 2:59 PM

## 2019-12-11 NOTE — PROGRESS NOTES
History  HPI     COMPREHENSIVE EYE EXAM     In both eyes.  Occurring constantly.  Since onset it is gradually worsening.  Associated symptoms include dryness.  Treatments tried include artificial tears.  Pain was noted as 0/10.              Comments     Annual exam. Pt states she cannot see well. Last eye exam 1 year ago- glasses may be older. No pain. Refresh eye drops. Pt has really dry eyes.    FUNMILAYO [place] COT 1:47 PM December 11, 2019             Last edited by Jose Pollard COT on 12/11/2019  1:47 PM. (History)          Assessment/Plan  (H52.13) Myopia of both eyes  (primary encounter diagnosis)  Comment: Myopia with presbyopia  Plan: REFRACTION         Educated patient on condition and clinical findings. Dispensed spectacle prescription for full time wear. Educated patient on possibility of adaptation period, if symptoms do not improve return to clinic for further testing.    (Z96.1) Pseudophakia  Comment: Clear capsule, stable  Plan:  No treatment indicated at this time. Monitor annually.    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 Monitor as needed.    (H35.30) Age-related macular degeneration  Comment: Limiting vision both eyes (20/40, 20/30 BCVA); reporting poor reading vision  Plan: OCT Retina Spectralis OU (both eyes), OCCUPATIONAL THERAPY REFERRAL         Baseline OCT obtained. Referred to [facility] for Low Vision consultation. Monitor annually. Message sent to Dr. Light (PCP) regarding adding AREDS 2 supplement.    Addendum: Patient's PCP approves use of AREDs 2 supplement.    Return to clinic in 1 year for comprehensive eye exam.    Complete documentation of historical and exam elements from today's encounter can  be found in the  full encounter summary report (not reduplicated in this progress  note). I personally obtained the chief complaint(s) and history of present illness. I  confirmed and edited as necessary the review of systems, past medical/surgical  history, family history, social history, and examination findings as documented by  others; and I examined the patient myself. I personally reviewed the relevant tests,  images, and reports as documented above. I formulated and edited as necessary the  assessment and plan and discussed the findings and management plan with the  patient and family.    Jan Allen OD, FAAO

## 2019-12-30 NOTE — LETTER
12/30/2019       RE: Jatin Ayala  [address]  [facility] [address]     Dear Colleague,    Thank you for referring your patient, Jatin Ayala, to the [facility] at [facility]. Please see a copy of my visit note below.    Date of Service: 12/30/2019    Chief Complaint:   Chief Complaint   Patient presents with     Left Foot - Eval/Assessment     Consult     bunion on left foot        HPI: Jatin is a 89 year old female who presents today for further evaluation of left 2nd digit ulceration. She presents today with an  and her daughter. She relates that she has had the wound on the left medial 2nd toe for months. It started from rubbing on the big toe. She relates that it is quite painful for her. She has been putting a bandaid on the area.       PMH:   Past Medical History:   Diagnosis Date     Gastroesophageal reflux disease      Hypertension        PSxH:   Past Surgical History:   Procedure Laterality Date     CATARACT IOL, RT/LT       REPAIR PTOSIS         Allergies: Patient has no known allergies.    SH:   Social History     Socioeconomic History     Marital status:      Spouse name: Not on file     Number of children: Not on file     Years of education: Not on file     Highest education level: Not on file   Occupational History     Not on file   Social Needs     Financial resource strain: Not on file     Food insecurity:     Worry: Not on file     Inability: Not on file     Transportation needs:     Medical: Not on file     Non-medical: Not on file   Tobacco Use     Smoking status: Never Smoker     Smokeless tobacco: Never Used   Substance and Sexual Activity     Alcohol use: Not Currently     Drug use: Not Currently     Sexual activity: Not Currently   Lifestyle     Physical activity:     Days per week: Not on file     Minutes per session: Not on file     Stress: Not on file   Relationships     Social connections:     Talks on phone:  Not on file     Gets together: Not on file     Attends Episcopal service: Not on file     Active member of club or organization: Not on file     Attends meetings of clubs or organizations: Not on file     Relationship status: Not on file     Intimate partner violence:     Fear of current or ex partner: Not on file     Emotionally abused: Not on file     Physically abused: Not on file     Forced sexual activity: Not on file   Other Topics Concern     Not on file   Social History Narrative     Not on file       FH:   Family History   Problem Relation Age of Onset     Glaucoma No family hx of      Macular Degeneration No family hx of        Objective:  Data Unavailable Data Unavailable Data Unavailable Data Unavailable Data Unavailable 0 lbs 0 oz    PT and DP pulses are 1/4 bilaterally. CRT is 1 second. Absent pedal hair.   Gross sensation is intact bilaterally.   Equinus is noted bilaterally. No pain with active or passive ROM of the ankle, MTJ, 1st ray, or halluces bilaterally. HAV on the left foot with an overriding 2nd digit.   Nails normal bilaterally.         A pressure wound is noted at left  medial 2nd digit at the PIPJ measuring 0.5cm x 0.5cm x 0.2cm.    Owens Classification: 3    Wound base: Pink  White/Granulation  bone    Edges: inflamed    Drainage: slight/serous    Odor: no    Undermining: no    Bone Exposure: Yes: PIPJ    Clinical Signs of Infection: No    After obtaining patient consent, the wound was irrigated with copious amounts of saline. No sharp debridement performed today.       Assessment: Diana is a 89 YO woman with a pressure wound on the left medial 2nd toe. This does go to bone and she has some cortical irregularity, but I have less suspicion for osteomyelitis based on the appearance of the digit.     Plan:  - Pt seen and evaluated.  - XRs taken and discussed with her and her daughter.   - Needs pressure relief from the toe. She does not want an amputation.   - Hydrofera blue and bordered  gauze on the toe. Change daily. Orders written for assisted living nurses.  - See again in 2 weeks.              Again, thank you for allowing me to participate in the care of your patient.      Sincerely,    Eloy Carrion DPM

## 2019-12-30 NOTE — PROGRESS NOTES
Date of Service: 12/30/2019    Chief Complaint:   Chief Complaint   Patient presents with     Left Foot - Eval/Assessment     Consult     bunion on left foot        HPI: Jatin is a 89 year old female who presents today for further evaluation of left 2nd digit ulceration. She presents today with an  and her daughter. She relates that she has had the wound on the left medial 2nd toe for months. It started from rubbing on the big toe. She relates that it is quite painful for her. She has been putting a bandaid on the area.     Review of Systems: Answers for HPI/ROS submitted by the patient on 12/30/2019   General Symptoms: No  Skin Symptoms: No  HENT Symptoms: No  EYE SYMPTOMS: No  HEART SYMPTOMS: No  LUNG SYMPTOMS: No  INTESTINAL SYMPTOMS: No  URINARY SYMPTOMS: No  GYNECOLOGIC SYMPTOMS: No  BREAST SYMPTOMS: No  SKELETAL SYMPTOMS: Yes  BLOOD SYMPTOMS: No  NERVOUS SYSTEM SYMPTOMS: Yes  MENTAL HEALTH SYMPTOMS: No  Back pain: Yes  Muscle aches: Yes  Neck pain: No  Swollen joints: Yes  Joint pain: Yes  Bone pain: Yes  Muscle cramps: Yes  Muscle weakness: Yes  Joint stiffness: No  Bone fracture: No  Trouble with coordination: Yes  Dizziness or trouble with balance: Yes  Fainting or black-out spells: No  Memory loss: Yes  Headache: No  Seizures: No  Speech problems: No  Tingling: No  Tremor: No  Weakness: Yes  Difficulty walking: Yes  Paralysis: No  Numbness: No      PMH:   Past Medical History:   Diagnosis Date     Gastroesophageal reflux disease      Hypertension        PSxH:   Past Surgical History:   Procedure Laterality Date     CATARACT IOL, RT/LT       REPAIR PTOSIS         Allergies: Patient has no known allergies.    SH:   Social History     Socioeconomic History     Marital status:      Spouse name: Not on file     Number of children: Not on file     Years of education: Not on file     Highest education level: Not on file   Occupational History     Not on file   Social Needs     Financial resource  strain: Not on file     Food insecurity:     Worry: Not on file     Inability: Not on file     Transportation needs:     Medical: Not on file     Non-medical: Not on file   Tobacco Use     Smoking status: Never Smoker     Smokeless tobacco: Never Used   Substance and Sexual Activity     Alcohol use: Not Currently     Drug use: Not Currently     Sexual activity: Not Currently   Lifestyle     Physical activity:     Days per week: Not on file     Minutes per session: Not on file     Stress: Not on file   Relationships     Social connections:     Talks on phone: Not on file     Gets together: Not on file     Attends Restorationist service: Not on file     Active member of club or organization: Not on file     Attends meetings of clubs or organizations: Not on file     Relationship status: Not on file     Intimate partner violence:     Fear of current or ex partner: Not on file     Emotionally abused: Not on file     Physically abused: Not on file     Forced sexual activity: Not on file   Other Topics Concern     Not on file   Social History Narrative     Not on file       FH:   Family History   Problem Relation Age of Onset     Glaucoma No family hx of      Macular Degeneration No family hx of        Objective:  Data Unavailable Data Unavailable Data Unavailable Data Unavailable Data Unavailable 0 lbs 0 oz    PT and DP pulses are 1/4 bilaterally. CRT is 1 second. Absent pedal hair.   Gross sensation is intact bilaterally.   Equinus is noted bilaterally. No pain with active or passive ROM of the ankle, MTJ, 1st ray, or halluces bilaterally. HAV on the left foot with an overriding 2nd digit.   Nails normal bilaterally.         A pressure wound is noted at left  medial 2nd digit at the PIPJ measuring 0.5cm x 0.5cm x 0.2cm.    Owens Classification: 3    Wound base: Pink  White/Granulation  bone    Edges: inflamed    Drainage: slight/serous    Odor: no    Undermining: no    Bone Exposure: Yes: PIPJ    Clinical Signs of  Infection: No    After obtaining patient consent, the wound was irrigated with copious amounts of saline. No sharp debridement performed today.       Assessment: Diana is a 89 YO woman with a pressure wound on the left medial 2nd toe. This does go to bone and she has some cortical irregularity, but I have less suspicion for osteomyelitis based on the appearance of the digit.     Plan:  - Pt seen and evaluated.  - XRs taken and discussed with her and her daughter.   - Needs pressure relief from the toe. She does not want an amputation.   - Hydrofera blue and bordered gauze on the toe. Change daily. Orders written for assisted living nurses.  - See again in 2 weeks.

## 2019-12-30 NOTE — PATIENT INSTRUCTIONS
Change the dressing on the left 2nd toe daily. Cleanse with MicroKlenz. Pat dry. Apply a small amount of Iodosorb to the wound. Cover with a bordered gauze and then weave lamb's wool through her toes for extra padding.

## 2019-12-30 NOTE — NURSING NOTE
Reason For Visit:   Chief Complaint   Patient presents with     Left Foot - Eval/Assessment     Consult     bunion on left foot       There were no vitals taken for this visit.    Pain Assessment  Patient Currently in Pain: Yes  0-10 Pain Scale: 9    Yoli Valera, ATC

## 2019-12-31 NOTE — PATIENT INSTRUCTIONS
Here is the plan from today's visit    Left shoulder pain after fall  - Tylenol as needed for pain   - You may continue to use the Salonpas  - Ice/heat as able  - Gentle motion of the left shoulder/arm    Please call or return to clinic if your pain is not improving over the next 1 week.    Thank you for coming to [facility] today.    Scheduling:  If you have any concerns about today's visit or wish to schedule another appointment please call our office during normal business hours [phone] (8-5:00 M-F)  Medical Concerns:  If you have urgent medical concerns please call [phone] at any time of the day.    Crystal Domingo MD

## 2019-12-31 NOTE — PROGRESS NOTES
Diana is a 89 year old female with history of osteoporosis who presents for   Patient presents with:  Fall: Sunday 12-22 feel onto her right side/back, she was turning and lost footing, since she has been c/o left shoulder pain and back pain    Assessment and Plan      1. Acute pain of left shoulder  2. Mechanical fall  Patient with left shoulder/back pain following a mechanical fall 12/22/19. Suspect her pain is likely muscular, however, given her history of osteoporosis and worsening pain, did elect to obtain scapula x-rays today. To my read, there is no evidence of a displaced fracture. However, on the AP view there is an irregularity of the lateral scapula. Suspect this may due to her history of osteoporosis, will await radiologist's formal read. She may continue using Salonpas and Tylenol as needed for pain in the interim. Also encouraged heat/ice and gentle motion of the left shoulder/arm to prevent stiffness in the shoulder joint. Would prefer to avoid muscle relaxer, etc given her age and fall risk, but discussed with patient and her daughter that she should return to clinic if her pain is not improving over the next several days.   - XR Scapula Left; Future     Return if symptoms worsen or fail to improve.    There are no discontinued medications.    Crystal Domingo MD         HPI       Diana is a 89 year old female who presents for   Patient presents with:  Fall: Sunday 12-22 feel onto her right side/back, she was turning and lost footing, since she has been c/o right shoulder pain and back pain    Fall 9 days ago, has not been evaluated since. States she was trying to get to the bathroom quickly when she slipped and fell backwards, hitting her shoulders and back on her concrete floor. She did not hit her head or lose consciousness. Since that time has been having left shoulder pain that has been worsening. She has been using salonpas and Tylenol with some relief. No difficulty with movements of her  "hand or arm. No numbness or weakness. She has been acting normally per her daughter. No other injury.     A French  was used for  this visit.   Problem, Medication and Allergy Lists were reviewed and updated if needed.  Patient is an established patient of this clinic.  Reviewed and updated as needed this visit by clinical staff  Tobacco  Allergies  Meds  Med Hx  Surg Hx  Fam Hx  Soc Hx      Reviewed and updated as needed this visit by Provider       Health Maintenance Due   Topic Date Due     ADVANCE CARE PLANNING  08/18/1927     DEPRESSION ACTION PLAN  08/18/1927     ZOSTER IMMUNIZATION (1 of 2) 08/18/1977     MEDICARE ANNUAL WELLNESS VISIT  08/18/1992          Review of Systems:   Review of Systems  10 point review of symptoms was otherwise negative except as noted in HPI         Physical Exam:     Vitals:    12/31/19 1619 12/31/19 1629   BP: (!) 150/64 (!) 158/88   BP Location: Left arm Right arm   Patient Position: Sitting Sitting   Cuff Size: Adult Small Adult Small   Pulse: 88    Resp: 16    Temp: 97.5  F (36.4  C)    TempSrc: Oral    SpO2: 100%    Weight: 42.2 kg (93 lb)    Height: 1.45 m (4' 9.09\")      Body mass index is 20.06 kg/m .  Vitals were reviewed and were normal except for mildly elevated BP.     Physical Exam  Constitutional:       General: She is not in acute distress.     Appearance: She is well-developed. She is not diaphoretic.   Neck:      Musculoskeletal: Full passive range of motion without pain.   Cardiovascular:      Rate and Rhythm: Normal rate and regular rhythm.   Pulmonary:      Effort: Pulmonary effort is normal. No respiratory distress.   Musculoskeletal:      Right shoulder: Normal.      Left shoulder: She exhibits normal range of motion, no tenderness, no bony tenderness and no swelling.      Cervical back: She exhibits tenderness (over the lateral scapula). She exhibits no bony tenderness (of the cervical spine), no swelling and no deformity.   Neurological:     "  General: No focal deficit present.      Mental Status: She is alert.      Sensory: Sensation is intact.      Motor: Motor function is intact.      Gait: Gait abnormal (uses a walker).         Results:   No testing ordered today. Radiology read of x-ray pending.    Options for treatment and follow-up care were reviewed with the patient. Jatin Ayala  engaged in the decision making process and verbalized understanding of the options discussed and agreed with the final plan.  Crystal Domingo MD  [facility]

## 2019-12-31 NOTE — PROGRESS NOTES
Preceptor Attestation:   Patient seen, evaluated and discussed with the resident. I have verified the content of the note, which accurately reflects my assessment of the patient and the plan of care.   Supervising Physician:  Esme Torre MD

## 2020-01-01 ENCOUNTER — OFFICE VISIT (OUTPATIENT)
Dept: FAMILY MEDICINE | Facility: CLINIC | Age: 85
End: 2020-01-01
Payer: COMMERCIAL

## 2020-01-01 ENCOUNTER — APPOINTMENT (OUTPATIENT)
Dept: CARDIOLOGY | Facility: CLINIC | Age: 85
DRG: 884 | End: 2020-01-01
Payer: COMMERCIAL

## 2020-01-01 ENCOUNTER — APPOINTMENT (OUTPATIENT)
Dept: GENERAL RADIOLOGY | Facility: CLINIC | Age: 85
DRG: 884 | End: 2020-01-01
Payer: COMMERCIAL

## 2020-01-01 ENCOUNTER — VIRTUAL VISIT (OUTPATIENT)
Dept: FAMILY MEDICINE | Facility: CLINIC | Age: 85
End: 2020-01-01
Payer: COMMERCIAL

## 2020-01-01 ENCOUNTER — TELEPHONE (OUTPATIENT)
Dept: ORTHOPEDICS | Facility: CLINIC | Age: 85
End: 2020-01-01

## 2020-01-01 ENCOUNTER — DOCUMENTATION ONLY (OUTPATIENT)
Dept: FAMILY MEDICINE | Facility: CLINIC | Age: 85
End: 2020-01-01

## 2020-01-01 ENCOUNTER — OFFICE VISIT (OUTPATIENT)
Dept: ORTHOPEDICS | Facility: CLINIC | Age: 85
End: 2020-01-01
Payer: COMMERCIAL

## 2020-01-01 ENCOUNTER — TELEPHONE (OUTPATIENT)
Dept: FAMILY MEDICINE | Facility: CLINIC | Age: 85
End: 2020-01-01

## 2020-01-01 ENCOUNTER — PATIENT OUTREACH (OUTPATIENT)
Dept: CARE COORDINATION | Facility: CLINIC | Age: 85
End: 2020-01-01

## 2020-01-01 ENCOUNTER — MEDICAL CORRESPONDENCE (OUTPATIENT)
Dept: HEALTH INFORMATION MANAGEMENT | Facility: CLINIC | Age: 85
End: 2020-01-01

## 2020-01-01 ENCOUNTER — TELEPHONE (OUTPATIENT)
Dept: INFECTIOUS DISEASES | Facility: CLINIC | Age: 85
End: 2020-01-01

## 2020-01-01 ENCOUNTER — PATIENT OUTREACH (OUTPATIENT)
Dept: FAMILY MEDICINE | Facility: CLINIC | Age: 85
End: 2020-01-01

## 2020-01-01 ENCOUNTER — HOSPITAL ENCOUNTER (OUTPATIENT)
Facility: CLINIC | Age: 85
Setting detail: OBSERVATION
Discharge: HOME | End: 2020-09-23
Attending: INTERNAL MEDICINE | Admitting: FAMILY MEDICINE
Payer: COMMERCIAL

## 2020-01-01 ENCOUNTER — HOSPITAL ENCOUNTER (EMERGENCY)
Facility: CLINIC | Age: 85
Discharge: ANOTHER HOSPITAL | End: 2020-10-30
Attending: EMERGENCY MEDICINE | Admitting: EMERGENCY MEDICINE
Payer: COMMERCIAL

## 2020-01-01 ENCOUNTER — APPOINTMENT (OUTPATIENT)
Dept: GENERAL RADIOLOGY | Facility: CLINIC | Age: 85
End: 2020-01-01
Attending: EMERGENCY MEDICINE
Payer: COMMERCIAL

## 2020-01-01 ENCOUNTER — PRE VISIT (OUTPATIENT)
Dept: INFECTIOUS DISEASES | Facility: CLINIC | Age: 85
End: 2020-01-01

## 2020-01-01 ENCOUNTER — HOSPITAL ENCOUNTER (INPATIENT)
Facility: CLINIC | Age: 85
LOS: 6 days | Discharge: NURSING, REHAB OR LONG-TERM CARE | DRG: 884 | End: 2020-10-08
Attending: EMERGENCY MEDICINE | Admitting: FAMILY MEDICINE
Payer: COMMERCIAL

## 2020-01-01 ENCOUNTER — TELEPHONE (OUTPATIENT)
Dept: CARE COORDINATION | Facility: CLINIC | Age: 85
End: 2020-01-01

## 2020-01-01 ENCOUNTER — APPOINTMENT (OUTPATIENT)
Dept: CT IMAGING | Facility: CLINIC | Age: 85
End: 2020-01-01
Attending: INTERNAL MEDICINE
Payer: COMMERCIAL

## 2020-01-01 ENCOUNTER — VIRTUAL VISIT (OUTPATIENT)
Dept: INFECTIOUS DISEASES | Facility: CLINIC | Age: 85
End: 2020-01-01
Attending: PODIATRIST
Payer: COMMERCIAL

## 2020-01-01 ENCOUNTER — OFFICE VISIT (OUTPATIENT)
Dept: ORTHOPEDICS | Facility: CLINIC | Age: 85
End: 2020-01-01
Attending: PODIATRIST
Payer: COMMERCIAL

## 2020-01-01 ENCOUNTER — VIRTUAL VISIT (OUTPATIENT)
Dept: INFECTIOUS DISEASES | Facility: CLINIC | Age: 85
End: 2020-01-01
Payer: COMMERCIAL

## 2020-01-01 ENCOUNTER — ANCILLARY PROCEDURE (OUTPATIENT)
Dept: ULTRASOUND IMAGING | Facility: CLINIC | Age: 85
End: 2020-01-01
Attending: PODIATRIST
Payer: COMMERCIAL

## 2020-01-01 ENCOUNTER — TRANSFERRED RECORDS (OUTPATIENT)
Dept: HEALTH INFORMATION MANAGEMENT | Facility: CLINIC | Age: 85
End: 2020-01-01

## 2020-01-01 ENCOUNTER — ANCILLARY PROCEDURE (OUTPATIENT)
Dept: GENERAL RADIOLOGY | Facility: CLINIC | Age: 85
End: 2020-01-01
Attending: PODIATRIST
Payer: COMMERCIAL

## 2020-01-01 ENCOUNTER — DOCUMENTATION ONLY (OUTPATIENT)
Dept: CARE COORDINATION | Facility: CLINIC | Age: 85
End: 2020-01-01

## 2020-01-01 ENCOUNTER — VIRTUAL VISIT (OUTPATIENT)
Dept: ORTHOPEDICS | Facility: CLINIC | Age: 85
End: 2020-01-01
Payer: COMMERCIAL

## 2020-01-01 ENCOUNTER — PRE VISIT (OUTPATIENT)
Dept: ORTHOPEDICS | Facility: CLINIC | Age: 85
End: 2020-01-01

## 2020-01-01 ENCOUNTER — HEALTH MAINTENANCE LETTER (OUTPATIENT)
Age: 85
End: 2020-01-01

## 2020-01-01 ENCOUNTER — ANCILLARY PROCEDURE (OUTPATIENT)
Dept: ULTRASOUND IMAGING | Facility: CLINIC | Age: 85
End: 2020-01-01
Attending: INTERNAL MEDICINE
Payer: COMMERCIAL

## 2020-01-01 ENCOUNTER — MYC MEDICAL ADVICE (OUTPATIENT)
Dept: FAMILY MEDICINE | Facility: CLINIC | Age: 85
End: 2020-01-01

## 2020-01-01 ENCOUNTER — APPOINTMENT (OUTPATIENT)
Dept: CT IMAGING | Facility: CLINIC | Age: 85
DRG: 884 | End: 2020-01-01
Attending: EMERGENCY MEDICINE
Payer: COMMERCIAL

## 2020-01-01 ENCOUNTER — HOSPITAL ENCOUNTER (INPATIENT)
Facility: CLINIC | Age: 85
LOS: 1 days | DRG: 283 | End: 2020-11-01
Attending: HOSPITALIST | Admitting: INTERNAL MEDICINE
Payer: COMMERCIAL

## 2020-01-01 ENCOUNTER — MYC MEDICAL ADVICE (OUTPATIENT)
Dept: INFECTIOUS DISEASES | Facility: CLINIC | Age: 85
End: 2020-01-01

## 2020-01-01 ENCOUNTER — HOSPITAL ENCOUNTER (OUTPATIENT)
Dept: OCCUPATIONAL THERAPY | Facility: CLINIC | Age: 85
Discharge: HOME | End: 2020-01-06
Attending: OCCUPATIONAL THERAPIST | Admitting: OCCUPATIONAL THERAPIST
Payer: COMMERCIAL

## 2020-01-01 VITALS
SYSTOLIC BLOOD PRESSURE: 89 MMHG | HEART RATE: 89 BPM | DIASTOLIC BLOOD PRESSURE: 58 MMHG | TEMPERATURE: 98.4 F | OXYGEN SATURATION: 98 %

## 2020-01-01 VITALS
RESPIRATION RATE: 16 BRPM | WEIGHT: 99.6 LBS | DIASTOLIC BLOOD PRESSURE: 80 MMHG | TEMPERATURE: 97.9 F | SYSTOLIC BLOOD PRESSURE: 143 MMHG | HEART RATE: 76 BPM | BODY MASS INDEX: 21.49 KG/M2 | OXYGEN SATURATION: 100 %

## 2020-01-01 VITALS
DIASTOLIC BLOOD PRESSURE: 88 MMHG | OXYGEN SATURATION: 100 % | SYSTOLIC BLOOD PRESSURE: 120 MMHG | RESPIRATION RATE: 26 BRPM | HEART RATE: 113 BPM | TEMPERATURE: 97.2 F

## 2020-01-01 VITALS
HEART RATE: 73 BPM | TEMPERATURE: 95.4 F | OXYGEN SATURATION: 95 % | BODY MASS INDEX: 18.45 KG/M2 | DIASTOLIC BLOOD PRESSURE: 66 MMHG | SYSTOLIC BLOOD PRESSURE: 135 MMHG | WEIGHT: 85.5 LBS | RESPIRATION RATE: 16 BRPM

## 2020-01-01 VITALS
RESPIRATION RATE: 16 BRPM | HEART RATE: 87 BPM | DIASTOLIC BLOOD PRESSURE: 78 MMHG | OXYGEN SATURATION: 98 % | SYSTOLIC BLOOD PRESSURE: 132 MMHG | TEMPERATURE: 97.9 F

## 2020-01-01 VITALS
DIASTOLIC BLOOD PRESSURE: 59 MMHG | WEIGHT: 96.2 LBS | BODY MASS INDEX: 20.75 KG/M2 | TEMPERATURE: 97.4 F | OXYGEN SATURATION: 94 % | SYSTOLIC BLOOD PRESSURE: 101 MMHG | HEART RATE: 99 BPM

## 2020-01-01 VITALS
BODY MASS INDEX: 18.98 KG/M2 | WEIGHT: 87.96 LBS | RESPIRATION RATE: 16 BRPM | SYSTOLIC BLOOD PRESSURE: 143 MMHG | OXYGEN SATURATION: 100 % | HEART RATE: 72 BPM | TEMPERATURE: 96.2 F | DIASTOLIC BLOOD PRESSURE: 72 MMHG

## 2020-01-01 VITALS
DIASTOLIC BLOOD PRESSURE: 77 MMHG | HEART RATE: 88 BPM | TEMPERATURE: 99.1 F | SYSTOLIC BLOOD PRESSURE: 118 MMHG | OXYGEN SATURATION: 98 % | RESPIRATION RATE: 18 BRPM

## 2020-01-01 DIAGNOSIS — M62.84 SARCOPENIA: Primary | ICD-10-CM

## 2020-01-01 DIAGNOSIS — R60.0 BILATERAL LOWER EXTREMITY EDEMA: Primary | ICD-10-CM

## 2020-01-01 DIAGNOSIS — F33.0 MILD RECURRENT MAJOR DEPRESSION (H): ICD-10-CM

## 2020-01-01 DIAGNOSIS — G89.29 CHRONIC BILATERAL LOW BACK PAIN WITHOUT SCIATICA: ICD-10-CM

## 2020-01-01 DIAGNOSIS — D50.9 IRON DEFICIENCY ANEMIA, UNSPECIFIED IRON DEFICIENCY ANEMIA TYPE: ICD-10-CM

## 2020-01-01 DIAGNOSIS — L97.524 SKIN ULCER OF SECOND TOE OF LEFT FOOT WITH NECROSIS OF BONE (H): ICD-10-CM

## 2020-01-01 DIAGNOSIS — G89.29 CHRONIC PAIN OF BOTH KNEES: ICD-10-CM

## 2020-01-01 DIAGNOSIS — I10 ESSENTIAL HYPERTENSION: ICD-10-CM

## 2020-01-01 DIAGNOSIS — M54.50 CHRONIC BILATERAL LOW BACK PAIN WITHOUT SCIATICA: ICD-10-CM

## 2020-01-01 DIAGNOSIS — K21.9 GASTRO-ESOPHAGEAL REFLUX DISEASE WITHOUT ESOPHAGITIS: ICD-10-CM

## 2020-01-01 DIAGNOSIS — M86.9 OSTEOMYELITIS OF TOE OF LEFT FOOT (H): ICD-10-CM

## 2020-01-01 DIAGNOSIS — M25.561 CHRONIC PAIN OF BOTH KNEES: ICD-10-CM

## 2020-01-01 DIAGNOSIS — R11.11 NON-INTRACTABLE VOMITING WITHOUT NAUSEA: ICD-10-CM

## 2020-01-01 DIAGNOSIS — M17.0 PRIMARY OSTEOARTHRITIS OF BOTH KNEES: ICD-10-CM

## 2020-01-01 DIAGNOSIS — M70.52 BURSITIS OF BOTH KNEES, UNSPECIFIED BURSA: ICD-10-CM

## 2020-01-01 DIAGNOSIS — U07.1 2019 NOVEL CORONAVIRUS DISEASE (COVID-19): Primary | ICD-10-CM

## 2020-01-01 DIAGNOSIS — L97.524 SKIN ULCER OF SECOND TOE OF LEFT FOOT WITH NECROSIS OF BONE (H): Primary | ICD-10-CM

## 2020-01-01 DIAGNOSIS — N18.30 CKD (CHRONIC KIDNEY DISEASE) STAGE 3, GFR 30-59 ML/MIN (H): Primary | ICD-10-CM

## 2020-01-01 DIAGNOSIS — K21.9 GASTROESOPHAGEAL REFLUX DISEASE WITHOUT ESOPHAGITIS: ICD-10-CM

## 2020-01-01 DIAGNOSIS — M86.8X7 OTHER OSTEOMYELITIS OF LEFT FOOT (H): ICD-10-CM

## 2020-01-01 DIAGNOSIS — M25.562 CHRONIC PAIN OF BOTH KNEES: ICD-10-CM

## 2020-01-01 DIAGNOSIS — N18.30 CKD (CHRONIC KIDNEY DISEASE) STAGE 3, GFR 30-59 ML/MIN (H): ICD-10-CM

## 2020-01-01 DIAGNOSIS — M86.9 OSTEOMYELITIS OF TOE OF LEFT FOOT (H): Primary | ICD-10-CM

## 2020-01-01 DIAGNOSIS — H91.13 PRESBYCUSIS OF BOTH EARS: ICD-10-CM

## 2020-01-01 DIAGNOSIS — N17.9 ACUTE KIDNEY INJURY (H): ICD-10-CM

## 2020-01-01 DIAGNOSIS — R41.82 ALTERED MENTAL STATUS, UNSPECIFIED ALTERED MENTAL STATUS TYPE: ICD-10-CM

## 2020-01-01 DIAGNOSIS — L97.523: ICD-10-CM

## 2020-01-01 DIAGNOSIS — M81.0 OSTEOPOROSIS, UNSPECIFIED OSTEOPOROSIS TYPE, UNSPECIFIED PATHOLOGICAL FRACTURE PRESENCE: ICD-10-CM

## 2020-01-01 DIAGNOSIS — H01.01B ULCERATIVE BLEPHARITIS OF UPPER AND LOWER EYELIDS OF BOTH EYES: ICD-10-CM

## 2020-01-01 DIAGNOSIS — E87.6 HYPOKALEMIA: ICD-10-CM

## 2020-01-01 DIAGNOSIS — N17.9 ACUTE KIDNEY INJURY (NONTRAUMATIC) (H): ICD-10-CM

## 2020-01-01 DIAGNOSIS — R60.0 BILATERAL LOWER EXTREMITY EDEMA: ICD-10-CM

## 2020-01-01 DIAGNOSIS — M86.9 OSTEOMYELITIS OF LEFT FOOT, UNSPECIFIED TYPE (H): ICD-10-CM

## 2020-01-01 DIAGNOSIS — L97.523: Primary | ICD-10-CM

## 2020-01-01 DIAGNOSIS — E87.20 LACTIC ACIDOSIS: ICD-10-CM

## 2020-01-01 DIAGNOSIS — M86.672 OTHER CHRONIC OSTEOMYELITIS OF LEFT FOOT (H): ICD-10-CM

## 2020-01-01 DIAGNOSIS — Z20.828 CONTACT WITH AND (SUSPECTED) EXPOSURE TO OTHER VIRAL COMMUNICABLE DISEASES: ICD-10-CM

## 2020-01-01 DIAGNOSIS — N17.9 AKI (ACUTE KIDNEY INJURY) (H): ICD-10-CM

## 2020-01-01 DIAGNOSIS — R19.7 DIARRHEA, UNSPECIFIED TYPE: ICD-10-CM

## 2020-01-01 DIAGNOSIS — M86.8X7 OTHER OSTEOMYELITIS OF LEFT FOOT (H): Primary | ICD-10-CM

## 2020-01-01 DIAGNOSIS — L84 TYLOMA: Primary | ICD-10-CM

## 2020-01-01 DIAGNOSIS — R62.7 ADULT FAILURE TO THRIVE: ICD-10-CM

## 2020-01-01 DIAGNOSIS — N18.30 STAGE 3 CHRONIC KIDNEY DISEASE, UNSPECIFIED WHETHER STAGE 3A OR 3B CKD (H): ICD-10-CM

## 2020-01-01 DIAGNOSIS — Z79.2 ENCOUNTER FOR LONG-TERM (CURRENT) USE OF ANTIBIOTICS: ICD-10-CM

## 2020-01-01 DIAGNOSIS — H01.01A ULCERATIVE BLEPHARITIS OF UPPER AND LOWER EYELIDS OF BOTH EYES: ICD-10-CM

## 2020-01-01 DIAGNOSIS — M86.9 OSTEOMYELITIS OF SECOND TOE OF LEFT FOOT (H): ICD-10-CM

## 2020-01-01 DIAGNOSIS — R29.6 RECURRENT FALLS: ICD-10-CM

## 2020-01-01 DIAGNOSIS — R63.4 LOSS OF WEIGHT: ICD-10-CM

## 2020-01-01 DIAGNOSIS — M54.50 CHRONIC BILATERAL LOW BACK PAIN WITHOUT SCIATICA: Primary | ICD-10-CM

## 2020-01-01 DIAGNOSIS — G89.4 CHRONIC PAIN SYNDROME: ICD-10-CM

## 2020-01-01 DIAGNOSIS — I50.32 CHRONIC DIASTOLIC HEART FAILURE (H): ICD-10-CM

## 2020-01-01 DIAGNOSIS — M20.42 HAMMER TOE OF LEFT FOOT: Primary | ICD-10-CM

## 2020-01-01 DIAGNOSIS — E87.6 HYPOKALEMIA: Primary | ICD-10-CM

## 2020-01-01 DIAGNOSIS — R06.03 RESPIRATORY DISTRESS: ICD-10-CM

## 2020-01-01 DIAGNOSIS — K25.4 GASTROINTESTINAL HEMORRHAGE ASSOCIATED WITH GASTRIC ULCER: ICD-10-CM

## 2020-01-01 DIAGNOSIS — R10.9 LEFT FLANK PAIN: ICD-10-CM

## 2020-01-01 DIAGNOSIS — G89.29 CHRONIC BILATERAL LOW BACK PAIN WITHOUT SCIATICA: Primary | ICD-10-CM

## 2020-01-01 DIAGNOSIS — R63.4 WEIGHT LOSS: ICD-10-CM

## 2020-01-01 DIAGNOSIS — H35.30 AGE-RELATED MACULAR DEGENERATION: ICD-10-CM

## 2020-01-01 DIAGNOSIS — I69.90 LATE EFFECTS OF CEREBROVASCULAR DISEASE: Primary | ICD-10-CM

## 2020-01-01 DIAGNOSIS — F33.0 MILD RECURRENT MAJOR DEPRESSION (H): Primary | ICD-10-CM

## 2020-01-01 DIAGNOSIS — R60.0 BILATERAL LOWER EXTREMITY EDEMA: Chronic | ICD-10-CM

## 2020-01-01 DIAGNOSIS — R79.89 ELEVATED TROPONIN: ICD-10-CM

## 2020-01-01 DIAGNOSIS — M70.62 TROCHANTERIC BURSITIS OF BOTH HIPS: ICD-10-CM

## 2020-01-01 DIAGNOSIS — M70.51 BURSITIS OF BOTH KNEES, UNSPECIFIED BURSA: ICD-10-CM

## 2020-01-01 DIAGNOSIS — R60.9 EDEMA, UNSPECIFIED TYPE: ICD-10-CM

## 2020-01-01 DIAGNOSIS — H04.123 INSUFFICIENCY OF TEAR FILM OF BOTH EYES: ICD-10-CM

## 2020-01-01 DIAGNOSIS — K59.00 CONSTIPATION, UNSPECIFIED CONSTIPATION TYPE: ICD-10-CM

## 2020-01-01 DIAGNOSIS — R10.9 FLANK PAIN: ICD-10-CM

## 2020-01-01 DIAGNOSIS — M70.61 TROCHANTERIC BURSITIS OF BOTH HIPS: ICD-10-CM

## 2020-01-01 DIAGNOSIS — Z13.9 SCREENING FOR CONDITION: Primary | ICD-10-CM

## 2020-01-01 LAB
ALBUMIN SERPL-MCNC: 2.2 G/DL (ref 3.4–5)
ALBUMIN SERPL-MCNC: 2.3 G/DL (ref 3.4–5)
ALBUMIN SERPL-MCNC: 2.6 G/DL (ref 3.4–5)
ALBUMIN SERPL-MCNC: 2.7 G/DL (ref 3.4–5)
ALBUMIN SERPL-MCNC: 2.8 G/DL (ref 3.4–5)
ALBUMIN SERPL-MCNC: 3.1 G/DL (ref 3.4–5)
ALBUMIN SERPL-MCNC: 3.3 G/DL (ref 3.4–5)
ALBUMIN SERPL-MCNC: 3.6 G/DL (ref 3.4–5)
ALBUMIN SERPL-MCNC: 3.8 MG/DL (ref 3.5–4.7)
ALBUMIN SERPL-MCNC: 3.9 G/DL (ref 3.4–5)
ALBUMIN UR-MCNC: NEGATIVE MG/DL
ALBUMIN UR-MCNC: NEGATIVE MG/DL
ALP SERPL-CCNC: 102 U/L (ref 40–150)
ALP SERPL-CCNC: 102 U/L (ref 40–150)
ALP SERPL-CCNC: 224 U/L (ref 40–150)
ALP SERPL-CCNC: 243 U/L (ref 40–150)
ALP SERPL-CCNC: 62 U/L (ref 40–150)
ALP SERPL-CCNC: 62 U/L (ref 40–150)
ALP SERPL-CCNC: 64 U/L (ref 31.7–110.5)
ALP SERPL-CCNC: 64 U/L (ref 40–150)
ALP SERPL-CCNC: 77 U/L (ref 40–150)
ALP SERPL-CCNC: 86 U/L (ref 40–150)
ALT SERPL W P-5'-P-CCNC: 125 U/L (ref 0–50)
ALT SERPL W P-5'-P-CCNC: 15 U/L (ref 0–50)
ALT SERPL W P-5'-P-CCNC: 17 U/L (ref 0–50)
ALT SERPL W P-5'-P-CCNC: 17 U/L (ref 0–50)
ALT SERPL W P-5'-P-CCNC: 20 U/L (ref 0–50)
ALT SERPL W P-5'-P-CCNC: 21 U/L (ref 0–50)
ALT SERPL W P-5'-P-CCNC: 21 U/L (ref 0–50)
ALT SERPL W P-5'-P-CCNC: 22 U/L (ref 0–50)
ALT SERPL W P-5'-P-CCNC: 324 U/L (ref 0–50)
ALT SERPL-CCNC: 10.5 U/L (ref 0–45)
ANION GAP SERPL CALCULATED.3IONS-SCNC: 14 MMOL/L (ref 3–14)
ANION GAP SERPL CALCULATED.3IONS-SCNC: 16 MMOL/L (ref 3–14)
ANION GAP SERPL CALCULATED.3IONS-SCNC: 4 MMOL/L (ref 3–14)
ANION GAP SERPL CALCULATED.3IONS-SCNC: 5 MMOL/L (ref 3–14)
ANION GAP SERPL CALCULATED.3IONS-SCNC: 6 MMOL/L (ref 3–14)
ANION GAP SERPL CALCULATED.3IONS-SCNC: 7 MMOL/L (ref 3–14)
ANION GAP SERPL CALCULATED.3IONS-SCNC: 7 MMOL/L (ref 3–14)
APPEARANCE UR: CLEAR
APPEARANCE UR: CLEAR
AST SERPL W P-5'-P-CCNC: 10 U/L (ref 0–45)
AST SERPL W P-5'-P-CCNC: 12 U/L (ref 0–45)
AST SERPL W P-5'-P-CCNC: 19 U/L (ref 0–45)
AST SERPL W P-5'-P-CCNC: 20 U/L (ref 0–45)
AST SERPL W P-5'-P-CCNC: 213 U/L (ref 0–45)
AST SERPL W P-5'-P-CCNC: 23 U/L (ref 0–45)
AST SERPL W P-5'-P-CCNC: 25 U/L (ref 0–45)
AST SERPL W P-5'-P-CCNC: 35 U/L (ref 0–45)
AST SERPL W P-5'-P-CCNC: 701 U/L (ref 0–45)
AST SERPL-CCNC: 15 U/L (ref 0–45)
BACTERIA SPEC CULT: ABNORMAL
BACTERIA SPEC CULT: NO GROWTH
BACTERIA SPEC CULT: NO GROWTH
BACTERIA SPEC CULT: NORMAL
BASOPHILS # BLD AUTO: 0 10E9/L (ref 0–0.2)
BASOPHILS # BLD AUTO: 0.1 10E9/L (ref 0–0.2)
BASOPHILS NFR BLD AUTO: 0.3 %
BASOPHILS NFR BLD AUTO: 0.3 %
BASOPHILS NFR BLD AUTO: 0.4 %
BASOPHILS NFR BLD AUTO: 0.5 %
BASOPHILS NFR BLD AUTO: 0.6 %
BASOPHILS NFR BLD AUTO: 0.6 %
BASOPHILS NFR BLD AUTO: 0.9 %
BILIRUB SERPL-MCNC: 0.4 MG/DL (ref 0.2–1.3)
BILIRUB SERPL-MCNC: 0.6 MG/DL (ref 0.2–1.3)
BILIRUB SERPL-MCNC: 0.7 MG/DL (ref 0.2–1.3)
BILIRUB SERPL-MCNC: 0.7 MG/DL (ref 0.2–1.3)
BILIRUB SERPL-MCNC: 0.8 MG/DL (ref 0.2–1.3)
BILIRUB SERPL-MCNC: 0.8 MG/DL (ref 0.2–1.3)
BILIRUB SERPL-MCNC: 1 MG/DL (ref 0.2–1.3)
BILIRUB SERPL-MCNC: 1.1 MG/DL (ref 0.2–1.3)
BILIRUB SERPL-MCNC: 1.1 MG/DL (ref 0.2–1.3)
BILIRUB SERPL-MCNC: 1.7 MG/DL (ref 0.2–1.3)
BILIRUB UR QL STRIP: NEGATIVE
BILIRUB UR QL STRIP: NEGATIVE
BUN SERPL-MCNC: 12 MG/DL (ref 7–30)
BUN SERPL-MCNC: 12 MG/DL (ref 7–30)
BUN SERPL-MCNC: 14 MG/DL (ref 7–30)
BUN SERPL-MCNC: 18 MG/DL (ref 7–30)
BUN SERPL-MCNC: 19 MG/DL (ref 7–30)
BUN SERPL-MCNC: 20.4 MG/DL (ref 7–19)
BUN SERPL-MCNC: 26.4 MG/DL (ref 7–19)
BUN SERPL-MCNC: 30 MG/DL (ref 7–30)
BUN SERPL-MCNC: 32 MG/DL (ref 7–30)
BUN SERPL-MCNC: 33 MG/DL (ref 7–30)
BUN SERPL-MCNC: 35 MG/DL (ref 7–30)
BUN SERPL-MCNC: 39.2 MG/DL (ref 7–19)
BUN SERPL-MCNC: 43 MG/DL (ref 7–30)
BUN SERPL-MCNC: 45 MG/DL (ref 7–19)
BUN SERPL-MCNC: 45 MG/DL (ref 7–30)
CA-I BLD-MCNC: 3.9 MG/DL (ref 4.4–5.2)
CA-I BLD-MCNC: 4.1 MG/DL (ref 4.4–5.2)
CA-I BLD-MCNC: 4.8 MG/DL (ref 4.4–5.2)
CALCIUM SERPL-MCNC: 5.5 MG/DL (ref 8.5–10.1)
CALCIUM SERPL-MCNC: 6.8 MG/DL (ref 8.5–10.1)
CALCIUM SERPL-MCNC: 6.8 MG/DL (ref 8.5–10.1)
CALCIUM SERPL-MCNC: 7 MG/DL (ref 8.5–10.1)
CALCIUM SERPL-MCNC: 7.2 MG/DL (ref 8.5–10.1)
CALCIUM SERPL-MCNC: 7.4 MG/DL (ref 8.5–10.1)
CALCIUM SERPL-MCNC: 7.9 MG/DL (ref 8.5–10.1)
CALCIUM SERPL-MCNC: 8 MG/DL (ref 8.5–10.1)
CALCIUM SERPL-MCNC: 8.1 MG/DL (ref 8.5–10.1)
CALCIUM SERPL-MCNC: 8.5 MG/DL (ref 8.5–10.1)
CALCIUM SERPL-MCNC: 8.5 MG/DL (ref 8.5–10.1)
CALCIUM SERPL-MCNC: 8.6 MG/DL (ref 8.5–10.1)
CALCIUM SERPL-MCNC: 8.8 MG/DL (ref 8.5–10.1)
CALCIUM SERPL-MCNC: 9 MG/DL (ref 8.5–10.1)
CALCIUM SERPL-MCNC: 9 MG/DL (ref 8.5–10.1)
CALCIUM SERPL-MCNC: 9.2 MG/DL (ref 8.5–10.1)
CALCIUM SERPL-MCNC: 9.2 MG/DL (ref 8.5–10.1)
CHLORIDE SERPL-SCNC: 100 MMOL/L (ref 94–109)
CHLORIDE SERPL-SCNC: 101 MMOL/L (ref 94–109)
CHLORIDE SERPL-SCNC: 102 MMOL/L (ref 94–109)
CHLORIDE SERPL-SCNC: 104 MMOL/L (ref 94–109)
CHLORIDE SERPL-SCNC: 105 MMOL/L (ref 94–109)
CHLORIDE SERPL-SCNC: 106 MMOL/L (ref 94–109)
CHLORIDE SERPL-SCNC: 106 MMOL/L (ref 94–109)
CHLORIDE SERPL-SCNC: 107 MMOL/L (ref 94–109)
CHLORIDE SERPL-SCNC: 107 MMOL/L (ref 94–109)
CHLORIDE SERPL-SCNC: 108 MMOL/L (ref 94–109)
CHLORIDE SERPL-SCNC: 111 MMOL/L (ref 94–109)
CHLORIDE SERPL-SCNC: 114 MMOL/L (ref 94–109)
CHLORIDE SERPL-SCNC: 115 MMOL/L (ref 94–109)
CHLORIDE SERPLBLD-SCNC: 95 MMOL/L (ref 98–110)
CHLORIDE SERPLBLD-SCNC: 95.7 MMOL/L (ref 98–110)
CHLORIDE SERPLBLD-SCNC: 98.5 MMOL/L (ref 98–110)
CHLORIDE SERPLBLD-SCNC: 98.8 MMOL/L (ref 98–110)
CO2 SERPL-SCNC: 17 MMOL/L (ref 20–32)
CO2 SERPL-SCNC: 18 MMOL/L (ref 20–32)
CO2 SERPL-SCNC: 24 MMOL/L (ref 20–32)
CO2 SERPL-SCNC: 25 MMOL/L (ref 20–32)
CO2 SERPL-SCNC: 26 MMOL/L (ref 20–32)
CO2 SERPL-SCNC: 26.3 MMOL/L (ref 20–32)
CO2 SERPL-SCNC: 26.9 MMOL/L (ref 20–32)
CO2 SERPL-SCNC: 27 MMOL/L (ref 20–32)
CO2 SERPL-SCNC: 28 MMOL/L (ref 20–32)
CO2 SERPL-SCNC: 28 MMOL/L (ref 20–32)
CO2 SERPL-SCNC: 30 MMOL/L (ref 20–32)
CO2 SERPL-SCNC: 31.4 MMOL/L (ref 20–32)
CO2 SERPL-SCNC: 31.7 MMOL/L (ref 20–32)
COLOR UR AUTO: YELLOW
COLOR UR AUTO: YELLOW
COPATH REPORT: NORMAL
COPPER SERPL-MCNC: 36.2 UG/DL (ref 80–155)
CREAT SERPL-MCNC: 0.87 MG/DL (ref 0.52–1.04)
CREAT SERPL-MCNC: 0.92 MG/DL (ref 0.52–1.04)
CREAT SERPL-MCNC: 1 MG/DL (ref 0.52–1.04)
CREAT SERPL-MCNC: 1 MG/DL (ref 0.5–1)
CREAT SERPL-MCNC: 1.03 MG/DL (ref 0.52–1.04)
CREAT SERPL-MCNC: 1.12 MG/DL (ref 0.52–1.04)
CREAT SERPL-MCNC: 1.2 MG/DL (ref 0.52–1.04)
CREAT SERPL-MCNC: 1.27 MG/DL (ref 0.52–1.04)
CREAT SERPL-MCNC: 1.34 MG/DL (ref 0.52–1.04)
CREAT SERPL-MCNC: 1.36 MG/DL (ref 0.52–1.04)
CREAT SERPL-MCNC: 1.37 MG/DL (ref 0.52–1.04)
CREAT SERPL-MCNC: 1.5 MG/DL (ref 0.5–1)
CREAT SERPL-MCNC: 1.6 MG/DL (ref 0.5–1)
CREAT SERPL-MCNC: 1.67 MG/DL (ref 0.52–1.04)
CREAT SERPL-MCNC: 1.9 MG/DL (ref 0.5–1)
CREAT SERPL-MCNC: 1.97 MG/DL (ref 0.52–1.04)
CREAT SERPL-MCNC: 2.34 MG/DL (ref 0.52–1.04)
CRP SERPL-MCNC: <2.9 MG/L (ref 0–8)
DEPRECATED CALCIDIOL+CALCIFEROL SERPL-MC: 26 UG/L (ref 20–75)
DIFFERENTIAL METHOD BLD: ABNORMAL
EOSINOPHIL # BLD AUTO: 0 10E9/L (ref 0–0.7)
EOSINOPHIL # BLD AUTO: 0.1 10E9/L (ref 0–0.7)
EOSINOPHIL # BLD AUTO: 0.2 10E9/L (ref 0–0.7)
EOSINOPHIL # BLD AUTO: 0.2 10E9/L (ref 0–0.7)
EOSINOPHIL NFR BLD AUTO: 0 %
EOSINOPHIL NFR BLD AUTO: 0.2 %
EOSINOPHIL NFR BLD AUTO: 0.4 %
EOSINOPHIL NFR BLD AUTO: 0.6 %
EOSINOPHIL NFR BLD AUTO: 0.8 %
EOSINOPHIL NFR BLD AUTO: 1.8 %
EOSINOPHIL NFR BLD AUTO: 2.2 %
EOSINOPHIL NFR BLD AUTO: 2.3 %
EOSINOPHIL NFR BLD AUTO: 2.8 %
EOSINOPHIL NFR BLD AUTO: 2.9 %
ERYTHROCYTE [DISTWIDTH] IN BLOOD BY AUTOMATED COUNT: 16.6 % (ref 10–15)
ERYTHROCYTE [DISTWIDTH] IN BLOOD BY AUTOMATED COUNT: 16.7 % (ref 10–15)
ERYTHROCYTE [DISTWIDTH] IN BLOOD BY AUTOMATED COUNT: 16.9 % (ref 10–15)
ERYTHROCYTE [DISTWIDTH] IN BLOOD BY AUTOMATED COUNT: 17.1 % (ref 10–15)
ERYTHROCYTE [DISTWIDTH] IN BLOOD BY AUTOMATED COUNT: 17.2 % (ref 10–15)
ERYTHROCYTE [DISTWIDTH] IN BLOOD BY AUTOMATED COUNT: 17.2 % (ref 10–15)
ERYTHROCYTE [DISTWIDTH] IN BLOOD BY AUTOMATED COUNT: 17.4 % (ref 10–15)
ERYTHROCYTE [DISTWIDTH] IN BLOOD BY AUTOMATED COUNT: 22.1 % (ref 10–15)
ERYTHROCYTE [DISTWIDTH] IN BLOOD BY AUTOMATED COUNT: 22.1 % (ref 10–15)
ERYTHROCYTE [SEDIMENTATION RATE] IN BLOOD BY WESTERGREN METHOD: 5 MM/H (ref 0–30)
FOLATE SERPL-MCNC: 7.6 NG/ML
GFR SERPL CREATININE-BSD FRML MDRD: 17 ML/MIN/{1.73_M2}
GFR SERPL CREATININE-BSD FRML MDRD: 21 ML/MIN/{1.73_M2}
GFR SERPL CREATININE-BSD FRML MDRD: 26 ML/MIN/{1.73_M2}
GFR SERPL CREATININE-BSD FRML MDRD: 26.9 ML/MIN/1.7 M2
GFR SERPL CREATININE-BSD FRML MDRD: 31.3 ML/MIN/1.7 M2
GFR SERPL CREATININE-BSD FRML MDRD: 33 ML/MIN/{1.73_M2}
GFR SERPL CREATININE-BSD FRML MDRD: 33 ML/MIN/{1.73_M2}
GFR SERPL CREATININE-BSD FRML MDRD: 34 ML/MIN/{1.73_M2}
GFR SERPL CREATININE-BSD FRML MDRD: 34.9 ML/MIN/1.7 M2
GFR SERPL CREATININE-BSD FRML MDRD: 36 ML/MIN/{1.73_M2}
GFR SERPL CREATININE-BSD FRML MDRD: 39 ML/MIN/{1.73_M2}
GFR SERPL CREATININE-BSD FRML MDRD: 42 ML/MIN/{1.73_M2}
GFR SERPL CREATININE-BSD FRML MDRD: 47 ML/MIN/{1.73_M2}
GFR SERPL CREATININE-BSD FRML MDRD: 49 ML/MIN/{1.73_M2}
GFR SERPL CREATININE-BSD FRML MDRD: 53.8 ML/MIN/1.7 M2
GFR SERPL CREATININE-BSD FRML MDRD: 54 ML/MIN/{1.73_M2}
GFR SERPL CREATININE-BSD FRML MDRD: 58 ML/MIN/{1.73_M2}
GLUCOSE BLDC GLUCOMTR-MCNC: 152 MG/DL (ref 70–99)
GLUCOSE BLDC GLUCOMTR-MCNC: 98 MG/DL (ref 70–99)
GLUCOSE SERPL-MCNC: 100 MG/DL (ref 70–99)
GLUCOSE SERPL-MCNC: 102.5 MG'DL (ref 70–99)
GLUCOSE SERPL-MCNC: 112 MG/DL (ref 70–99)
GLUCOSE SERPL-MCNC: 112.1 MG'DL (ref 70–99)
GLUCOSE SERPL-MCNC: 118 MG/DL (ref 70–99)
GLUCOSE SERPL-MCNC: 119 MG/DL (ref 70–99)
GLUCOSE SERPL-MCNC: 163.6 MG'DL (ref 70–99)
GLUCOSE SERPL-MCNC: 164 MG/DL (ref 70–99)
GLUCOSE SERPL-MCNC: 167 MG/DL (ref 70–99)
GLUCOSE SERPL-MCNC: 70.3 MG'DL (ref 70–99)
GLUCOSE SERPL-MCNC: 71 MG/DL (ref 70–99)
GLUCOSE SERPL-MCNC: 76 MG/DL (ref 70–99)
GLUCOSE SERPL-MCNC: 84 MG/DL (ref 70–99)
GLUCOSE SERPL-MCNC: 85 MG/DL (ref 70–99)
GLUCOSE SERPL-MCNC: 90 MG/DL (ref 70–99)
GLUCOSE SERPL-MCNC: 90 MG/DL (ref 70–99)
GLUCOSE SERPL-MCNC: 97 MG/DL (ref 70–99)
GLUCOSE UR STRIP-MCNC: NEGATIVE MG/DL
GLUCOSE UR STRIP-MCNC: NEGATIVE MG/DL
GRAM STN SPEC: ABNORMAL
GRAM STN SPEC: ABNORMAL
HCT VFR BLD AUTO: 26 % (ref 35–47)
HCT VFR BLD AUTO: 27.3 % (ref 35–47)
HCT VFR BLD AUTO: 28.1 % (ref 35–47)
HCT VFR BLD AUTO: 31.5 % (ref 35–47)
HCT VFR BLD AUTO: 32.2 % (ref 35–47)
HCT VFR BLD AUTO: 32.5 % (ref 35–47)
HCT VFR BLD AUTO: 32.5 % (ref 35–47)
HCT VFR BLD AUTO: 33.7 % (ref 35–47)
HCT VFR BLD AUTO: 33.7 % (ref 35–47)
HCT VFR BLD AUTO: 35.3 % (ref 35–47)
HCT VFR BLD AUTO: 35.3 % (ref 35–47)
HGB BLD-MCNC: 10.1 G/DL (ref 11.7–15.7)
HGB BLD-MCNC: 10.2 G/DL (ref 11.7–15.7)
HGB BLD-MCNC: 10.4 G/DL (ref 11.7–15.7)
HGB BLD-MCNC: 10.5 G/DL (ref 11.7–15.7)
HGB BLD-MCNC: 10.7 G/DL (ref 11.7–15.7)
HGB BLD-MCNC: 10.8 G/DL (ref 11.7–15.7)
HGB BLD-MCNC: 11.1 G/DL (ref 11.7–15.7)
HGB BLD-MCNC: 8.1 G/DL (ref 11.7–15.7)
HGB BLD-MCNC: 8.2 G/DL (ref 11.7–15.7)
HGB BLD-MCNC: 8.2 G/DL (ref 11.7–15.7)
HGB BLD-MCNC: 8.3 G/DL (ref 11.7–15.7)
HGB BLD-MCNC: 8.6 G/DL (ref 11.7–15.7)
HGB BLD-MCNC: 8.9 G/DL (ref 11.7–15.7)
HGB BLD-MCNC: 9.7 G/DL (ref 11.7–15.7)
HGB UR QL STRIP: ABNORMAL
HGB UR QL STRIP: NEGATIVE
IMM GRANULOCYTES # BLD: 0 10E9/L (ref 0–0.4)
IMM GRANULOCYTES # BLD: 0.1 10E9/L (ref 0–0.4)
IMM GRANULOCYTES NFR BLD: 0.2 %
IMM GRANULOCYTES NFR BLD: 0.3 %
IMM GRANULOCYTES NFR BLD: 0.4 %
IMM GRANULOCYTES NFR BLD: 0.5 %
IMM GRANULOCYTES NFR BLD: 0.6 %
IMM GRANULOCYTES NFR BLD: 0.7 %
INR PPP: 1.34 (ref 0.86–1.14)
INTERPRETATION ECG - MUSE: NORMAL
KETONES UR STRIP-MCNC: NEGATIVE MG/DL
KETONES UR STRIP-MCNC: NEGATIVE MG/DL
LABORATORY COMMENT REPORT: NORMAL
LACTATE BLD-SCNC: 1.3 MMOL/L (ref 0.7–2)
LACTATE BLD-SCNC: 1.7 MMOL/L (ref 0.7–2)
LACTATE BLD-SCNC: 2.3 MMOL/L (ref 0.7–2)
LACTATE BLD-SCNC: 2.7 MMOL/L (ref 0.7–2)
LACTATE BLD-SCNC: 5.7 MMOL/L (ref 0.7–2)
LACTATE BLD-SCNC: 8.4 MMOL/L (ref 0.7–2)
LACTATE BLD-SCNC: 8.5 MMOL/L (ref 0.7–2)
LEUKOCYTE ESTERASE UR QL STRIP: ABNORMAL
LEUKOCYTE ESTERASE UR QL STRIP: NEGATIVE
LIPASE SERPL-CCNC: 164 U/L (ref 73–393)
LIPASE SERPL-CCNC: 231 U/L (ref 73–393)
LYMPHOCYTES # BLD AUTO: 1 10E9/L (ref 0.8–5.3)
LYMPHOCYTES # BLD AUTO: 1.5 10E9/L (ref 0.8–5.3)
LYMPHOCYTES # BLD AUTO: 1.5 10E9/L (ref 0.8–5.3)
LYMPHOCYTES # BLD AUTO: 1.6 10E9/L (ref 0.8–5.3)
LYMPHOCYTES # BLD AUTO: 1.6 10E9/L (ref 0.8–5.3)
LYMPHOCYTES # BLD AUTO: 1.8 10E9/L (ref 0.8–5.3)
LYMPHOCYTES # BLD AUTO: 1.8 10E9/L (ref 0.8–5.3)
LYMPHOCYTES # BLD AUTO: 2 10E9/L (ref 0.8–5.3)
LYMPHOCYTES # BLD AUTO: 2.2 10E9/L (ref 0.8–5.3)
LYMPHOCYTES # BLD AUTO: 2.5 10E9/L (ref 0.8–5.3)
LYMPHOCYTES NFR BLD AUTO: 13.4 %
LYMPHOCYTES NFR BLD AUTO: 22.8 %
LYMPHOCYTES NFR BLD AUTO: 22.9 %
LYMPHOCYTES NFR BLD AUTO: 26 %
LYMPHOCYTES NFR BLD AUTO: 31.8 %
LYMPHOCYTES NFR BLD AUTO: 32.3 %
LYMPHOCYTES NFR BLD AUTO: 34.4 %
LYMPHOCYTES NFR BLD AUTO: 35.8 %
LYMPHOCYTES NFR BLD AUTO: 43.8 %
LYMPHOCYTES NFR BLD AUTO: 45.7 %
Lab: ABNORMAL
Lab: ABNORMAL
Lab: NORMAL
MAGNESIUM SERPL-MCNC: 1.3 MG/DL (ref 1.6–2.3)
MAGNESIUM SERPL-MCNC: 1.3 MG/DL (ref 1.6–2.3)
MAGNESIUM SERPL-MCNC: 1.6 MG/DL (ref 1.6–2.3)
MAGNESIUM SERPL-MCNC: 1.7 MG/DL (ref 1.6–2.3)
MAGNESIUM SERPL-MCNC: 2.2 MG/DL (ref 1.6–2.3)
MAGNESIUM SERPL-MCNC: 2.4 MG/DL (ref 1.6–2.3)
MCH RBC QN AUTO: 27.1 PG (ref 26.5–33)
MCH RBC QN AUTO: 27.4 PG (ref 26.5–33)
MCH RBC QN AUTO: 27.9 PG (ref 26.5–33)
MCH RBC QN AUTO: 28.5 PG (ref 26.5–33)
MCH RBC QN AUTO: 28.7 PG (ref 26.5–33)
MCH RBC QN AUTO: 28.9 PG (ref 26.5–33)
MCH RBC QN AUTO: 31.3 PG (ref 26.5–33)
MCH RBC QN AUTO: 31.4 PG (ref 26.5–33)
MCHC RBC AUTO-ENTMCNC: 30 G/DL (ref 31.5–36.5)
MCHC RBC AUTO-ENTMCNC: 30.1 G/DL (ref 31.5–36.5)
MCHC RBC AUTO-ENTMCNC: 30.3 G/DL (ref 31.5–36.5)
MCHC RBC AUTO-ENTMCNC: 30.6 G/DL (ref 31.5–36.5)
MCHC RBC AUTO-ENTMCNC: 31.2 G/DL (ref 31.5–36.5)
MCHC RBC AUTO-ENTMCNC: 31.4 G/DL (ref 31.5–36.5)
MCHC RBC AUTO-ENTMCNC: 31.7 G/DL (ref 31.5–36.5)
MCHC RBC AUTO-ENTMCNC: 31.8 G/DL (ref 31.5–36.5)
MCHC RBC AUTO-ENTMCNC: 32 G/DL (ref 31.5–36.5)
MCHC RBC AUTO-ENTMCNC: 32.1 G/DL (ref 31.5–36.5)
MCHC RBC AUTO-ENTMCNC: 32.3 G/DL (ref 31.5–36.5)
MCV RBC AUTO: 101 FL (ref 78–100)
MCV RBC AUTO: 104 FL (ref 78–100)
MCV RBC AUTO: 89 FL (ref 78–100)
MCV RBC AUTO: 90 FL (ref 78–100)
MCV RBC AUTO: 91 FL (ref 78–100)
MONOCYTES # BLD AUTO: 0.3 10E9/L (ref 0–1.3)
MONOCYTES # BLD AUTO: 0.4 10E9/L (ref 0–1.3)
MONOCYTES # BLD AUTO: 0.5 10E9/L (ref 0–1.3)
MONOCYTES NFR BLD AUTO: 5.4 %
MONOCYTES NFR BLD AUTO: 6.1 %
MONOCYTES NFR BLD AUTO: 6.5 %
MONOCYTES NFR BLD AUTO: 6.7 %
MONOCYTES NFR BLD AUTO: 7.5 %
MONOCYTES NFR BLD AUTO: 7.8 %
MONOCYTES NFR BLD AUTO: 8.1 %
MONOCYTES NFR BLD AUTO: 8.8 %
MONOCYTES NFR BLD AUTO: 9.3 %
MONOCYTES NFR BLD AUTO: 9.5 %
MUCOUS THREADS #/AREA URNS LPF: PRESENT /LPF
NEUTROPHILS # BLD AUTO: 2.2 10E9/L (ref 1.6–8.3)
NEUTROPHILS # BLD AUTO: 2.3 10E9/L (ref 1.6–8.3)
NEUTROPHILS # BLD AUTO: 2.8 10E9/L (ref 1.6–8.3)
NEUTROPHILS # BLD AUTO: 2.9 10E9/L (ref 1.6–8.3)
NEUTROPHILS # BLD AUTO: 2.9 10E9/L (ref 1.6–8.3)
NEUTROPHILS # BLD AUTO: 3.2 10E9/L (ref 1.6–8.3)
NEUTROPHILS # BLD AUTO: 4.1 10E9/L (ref 1.6–8.3)
NEUTROPHILS # BLD AUTO: 4.3 10E9/L (ref 1.6–8.3)
NEUTROPHILS # BLD AUTO: 4.6 10E9/L (ref 1.6–8.3)
NEUTROPHILS # BLD AUTO: 6.1 10E9/L (ref 1.6–8.3)
NEUTROPHILS NFR BLD AUTO: 42.3 %
NEUTROPHILS NFR BLD AUTO: 44.4 %
NEUTROPHILS NFR BLD AUTO: 52.2 %
NEUTROPHILS NFR BLD AUTO: 52.8 %
NEUTROPHILS NFR BLD AUTO: 58.9 %
NEUTROPHILS NFR BLD AUTO: 60.3 %
NEUTROPHILS NFR BLD AUTO: 66.5 %
NEUTROPHILS NFR BLD AUTO: 66.6 %
NEUTROPHILS NFR BLD AUTO: 67.3 %
NEUTROPHILS NFR BLD AUTO: 80.4 %
NITRATE UR QL: NEGATIVE
NITRATE UR QL: NEGATIVE
NRBC # BLD AUTO: 0 10*3/UL
NRBC BLD AUTO-RTO: 0 /100
NT-PROBNP SERPL-MCNC: ABNORMAL PG/ML (ref 0–1800)
PH UR STRIP: 5.5 PH (ref 5–7)
PH UR STRIP: 7 PH (ref 5–7)
PHOSPHATE SERPL-MCNC: 1.4 MG/DL (ref 2.5–4.5)
PHOSPHATE SERPL-MCNC: 1.9 MG/DL (ref 2.5–4.5)
PHOSPHATE SERPL-MCNC: 2.9 MG/DL (ref 2.5–4.5)
PHOSPHATE SERPL-MCNC: 2.9 MG/DL (ref 2.5–4.5)
PHOSPHATE SERPL-MCNC: 3 MG/DL (ref 2.5–4.5)
PHOSPHATE SERPL-MCNC: 3 MG/DL (ref 2.5–4.5)
PHOSPHATE SERPL-MCNC: 4.1 MG/DL (ref 2.5–4.5)
PLATELET # BLD AUTO: 183 10E9/L (ref 150–450)
PLATELET # BLD AUTO: 235 10E9/L (ref 150–450)
PLATELET # BLD AUTO: 261 10E9/L (ref 150–450)
PLATELET # BLD AUTO: 330 10E9/L (ref 150–450)
PLATELET # BLD AUTO: 359 10E9/L (ref 150–450)
PLATELET # BLD AUTO: 412 10E9/L (ref 150–450)
PLATELET # BLD AUTO: 76 10E9/L (ref 150–450)
PLATELET # BLD AUTO: 85 10E9/L (ref 150–450)
PLATELET # BLD AUTO: 93 10E9/L (ref 150–450)
PLATELET # BLD AUTO: 93 10E9/L (ref 150–450)
PLATELET # BLD AUTO: 95 10E9/L (ref 150–450)
POTASSIUM SERPL-SCNC: 3 MMOL/L (ref 3.4–5.3)
POTASSIUM SERPL-SCNC: 3.2 MMOL/L (ref 3.3–4.5)
POTASSIUM SERPL-SCNC: 3.2 MMOL/L (ref 3.3–4.5)
POTASSIUM SERPL-SCNC: 3.4 MMOL/L (ref 3.4–5.3)
POTASSIUM SERPL-SCNC: 3.4 MMOL/L (ref 3.4–5.3)
POTASSIUM SERPL-SCNC: 3.6 MMOL/L (ref 3.4–5.3)
POTASSIUM SERPL-SCNC: 3.8 MMOL/L (ref 3.4–5.3)
POTASSIUM SERPL-SCNC: 4 MMOL/L (ref 3.4–5.3)
POTASSIUM SERPL-SCNC: 4 MMOL/L (ref 3.4–5.3)
POTASSIUM SERPL-SCNC: 4.1 MMOL/L (ref 3.3–4.5)
POTASSIUM SERPL-SCNC: 4.2 MMOL/L (ref 3.4–5.3)
POTASSIUM SERPL-SCNC: 4.3 MMOL/L (ref 3.4–5.3)
POTASSIUM SERPL-SCNC: 4.4 MMOL/L (ref 3.4–5.3)
POTASSIUM SERPL-SCNC: 4.4 MMOL/L (ref 3.4–5.3)
POTASSIUM SERPL-SCNC: 4.5 MMOL/L (ref 3.3–4.5)
POTASSIUM SERPL-SCNC: 5.1 MMOL/L (ref 3.4–5.3)
PROCALCITONIN SERPL-MCNC: <0.05 NG/ML
PROCALCITONIN SERPL-MCNC: <0.05 NG/ML
PROT SERPL-MCNC: 4.8 G/DL (ref 6.8–8.8)
PROT SERPL-MCNC: 4.9 G/DL (ref 6.8–8.8)
PROT SERPL-MCNC: 5.3 G/DL (ref 6.8–8.8)
PROT SERPL-MCNC: 6 G/DL (ref 6.8–8.8)
PROT SERPL-MCNC: 6.2 G/DL (ref 6.8–8.8)
PROT SERPL-MCNC: 6.4 G/DL (ref 6.8–8.8)
PROT SERPL-MCNC: 6.6 G/DL (ref 6.8–8.8)
PROT SERPL-MCNC: 6.7 G/DL (ref 6.8–8.8)
PROT SERPL-MCNC: 7.5 G/DL (ref 6.8–8.8)
PROT SERPL-MCNC: 8.1 G/DL (ref 6.8–8.8)
PTH-INTACT SERPL-MCNC: 159 PG/ML (ref 18–80)
RADIOLOGIST FLAGS: NORMAL
RBC # BLD AUTO: 2.58 10E12/L (ref 3.8–5.2)
RBC # BLD AUTO: 2.62 10E12/L (ref 3.8–5.2)
RBC # BLD AUTO: 3.1 10E12/L (ref 3.8–5.2)
RBC # BLD AUTO: 3.49 10E12/L (ref 3.8–5.2)
RBC # BLD AUTO: 3.58 10E12/L (ref 3.8–5.2)
RBC # BLD AUTO: 3.63 10E12/L (ref 3.8–5.2)
RBC # BLD AUTO: 3.66 10E12/L (ref 3.8–5.2)
RBC # BLD AUTO: 3.72 10E12/L (ref 3.8–5.2)
RBC # BLD AUTO: 3.73 10E12/L (ref 3.8–5.2)
RBC # BLD AUTO: 3.87 10E12/L (ref 3.8–5.2)
RBC # BLD AUTO: 3.89 10E12/L (ref 3.8–5.2)
RBC #/AREA URNS AUTO: 11 /HPF (ref 0–2)
RETICS # AUTO: 35.4 10E9/L (ref 25–95)
RETICS/RBC NFR AUTO: 1 % (ref 0.5–2)
SARS-COV-2 RNA SPEC QL NAA+PROBE: NEGATIVE
SARS-COV-2 RNA SPEC QL NAA+PROBE: NORMAL
SARS-COV-2 RNA SPEC QL NAA+PROBE: NOT DETECTED
SODIUM SERPL-SCNC: 132.9 MMOL/L (ref 132.6–141.4)
SODIUM SERPL-SCNC: 134.3 MMOL/L (ref 132.6–141.4)
SODIUM SERPL-SCNC: 135 MMOL/L (ref 133–144)
SODIUM SERPL-SCNC: 135 MMOL/L (ref 133–144)
SODIUM SERPL-SCNC: 135.8 MMOL/L (ref 132.6–141.4)
SODIUM SERPL-SCNC: 136 MMOL/L (ref 133–144)
SODIUM SERPL-SCNC: 137 MMOL/L (ref 133–144)
SODIUM SERPL-SCNC: 137 MMOL/L (ref 133–144)
SODIUM SERPL-SCNC: 138 MMOL/L (ref 133–144)
SODIUM SERPL-SCNC: 138.3 MMOL/L (ref 132.6–141.4)
SODIUM SERPL-SCNC: 140 MMOL/L (ref 133–144)
SODIUM SERPL-SCNC: 144 MMOL/L (ref 133–144)
SODIUM SERPL-SCNC: 144 MMOL/L (ref 133–144)
SODIUM SERPL-SCNC: 146 MMOL/L (ref 133–144)
SOURCE: ABNORMAL
SOURCE: NORMAL
SP GR UR STRIP: 1.01 (ref 1–1.03)
SP GR UR STRIP: 1.02 (ref 1–1.03)
SPECIMEN SOURCE: ABNORMAL
SPECIMEN SOURCE: ABNORMAL
SPECIMEN SOURCE: NORMAL
SQUAMOUS #/AREA URNS AUTO: 1 /HPF (ref 0–1)
TRANS CELLS #/AREA URNS HPF: 1 /HPF (ref 0–1)
TROPONIN I SERPL-MCNC: 0.04 UG/L (ref 0–0.04)
TROPONIN I SERPL-MCNC: 0.05 UG/L (ref 0–0.04)
TROPONIN I SERPL-MCNC: 0.06 UG/L (ref 0–0.04)
TROPONIN I SERPL-MCNC: 1.38 UG/L (ref 0–0.04)
TROPONIN I SERPL-MCNC: 2.16 UG/L (ref 0–0.04)
TROPONIN I SERPL-MCNC: 2.35 UG/L (ref 0–0.04)
TROPONIN I SERPL-MCNC: 2.55 UG/L (ref 0–0.04)
TSH SERPL DL<=0.005 MIU/L-ACNC: 1.78 MU/L (ref 0.4–4)
UROBILINOGEN UR STRIP-MCNC: NORMAL MG/DL (ref 0–2)
UROBILINOGEN UR STRIP-MCNC: NORMAL MG/DL (ref 0–2)
VIT B1 BLD-MCNC: 59 NMOL/L (ref 70–180)
VIT B12 SERPL-MCNC: 227 PG/ML (ref 193–986)
VIT B2 SERPL-MCNC: 14 MCG/L (ref 1–19)
WBC # BLD AUTO: 10.6 10E9/L (ref 4–11)
WBC # BLD AUTO: 4.8 10E9/L (ref 4–11)
WBC # BLD AUTO: 4.9 10E9/L (ref 4–11)
WBC # BLD AUTO: 5.3 10E9/L (ref 4–11)
WBC # BLD AUTO: 5.5 10E9/L (ref 4–11)
WBC # BLD AUTO: 5.5 10E9/L (ref 4–11)
WBC # BLD AUTO: 5.6 10E9/L (ref 4–11)
WBC # BLD AUTO: 6.1 10E9/L (ref 4–11)
WBC # BLD AUTO: 6.4 10E9/L (ref 4–11)
WBC # BLD AUTO: 6.9 10E9/L (ref 4–11)
WBC # BLD AUTO: 7.5 10E9/L (ref 4–11)
WBC #/AREA URNS AUTO: 4 /HPF (ref 0–5)
YEAST #/AREA URNS HPF: ABNORMAL /HPF
ZINC SERPL-MCNC: 56 UG/DL (ref 60–120)

## 2020-01-01 PROCEDURE — 250N000013 HC RX MED GY IP 250 OP 250 PS 637: Performed by: STUDENT IN AN ORGANIZED HEALTH CARE EDUCATION/TRAINING PROGRAM

## 2020-01-01 PROCEDURE — 120N000001 HC R&B MED SURG/OB

## 2020-01-01 PROCEDURE — 96360 HYDRATION IV INFUSION INIT: CPT | Performed by: EMERGENCY MEDICINE

## 2020-01-01 PROCEDURE — 258N000003 HC RX IP 258 OP 636: Performed by: STUDENT IN AN ORGANIZED HEALTH CARE EDUCATION/TRAINING PROGRAM

## 2020-01-01 PROCEDURE — 99285 EMERGENCY DEPT VISIT HI MDM: CPT | Mod: 25 | Performed by: INTERNAL MEDICINE

## 2020-01-01 PROCEDURE — 99223 1ST HOSP IP/OBS HIGH 75: CPT | Mod: AI | Performed by: INTERNAL MEDICINE

## 2020-01-01 PROCEDURE — 85025 COMPLETE CBC W/AUTO DIFF WBC: CPT | Performed by: STUDENT IN AN ORGANIZED HEALTH CARE EDUCATION/TRAINING PROGRAM

## 2020-01-01 PROCEDURE — 83605 ASSAY OF LACTIC ACID: CPT | Performed by: EMERGENCY MEDICINE

## 2020-01-01 PROCEDURE — 84100 ASSAY OF PHOSPHORUS: CPT | Performed by: STUDENT IN AN ORGANIZED HEALTH CARE EDUCATION/TRAINING PROGRAM

## 2020-01-01 PROCEDURE — 84252 ASSAY OF VITAMIN B-2: CPT | Performed by: STUDENT IN AN ORGANIZED HEALTH CARE EDUCATION/TRAINING PROGRAM

## 2020-01-01 PROCEDURE — 25000132 ZZH RX MED GY IP 250 OP 250 PS 637: Performed by: STUDENT IN AN ORGANIZED HEALTH CARE EDUCATION/TRAINING PROGRAM

## 2020-01-01 PROCEDURE — 120N000002 HC R&B MED SURG/OB UMMC

## 2020-01-01 PROCEDURE — 96375 TX/PRO/DX INJ NEW DRUG ADDON: CPT | Performed by: EMERGENCY MEDICINE

## 2020-01-01 PROCEDURE — 97165 OT EVAL LOW COMPLEX 30 MIN: CPT | Mod: GO | Performed by: OCCUPATIONAL THERAPIST

## 2020-01-01 PROCEDURE — 87186 SC STD MICRODIL/AGAR DIL: CPT | Performed by: PODIATRIST

## 2020-01-01 PROCEDURE — C9113 INJ PANTOPRAZOLE SODIUM, VIA: HCPCS | Performed by: EMERGENCY MEDICINE

## 2020-01-01 PROCEDURE — 36415 COLL VENOUS BLD VENIPUNCTURE: CPT | Performed by: STUDENT IN AN ORGANIZED HEALTH CARE EDUCATION/TRAINING PROGRAM

## 2020-01-01 PROCEDURE — 85025 COMPLETE CBC W/AUTO DIFF WBC: CPT | Performed by: INTERNAL MEDICINE

## 2020-01-01 PROCEDURE — 93308 TTE F-UP OR LMTD: CPT | Performed by: EMERGENCY MEDICINE

## 2020-01-01 PROCEDURE — 83735 ASSAY OF MAGNESIUM: CPT | Performed by: FAMILY MEDICINE

## 2020-01-01 PROCEDURE — 999N001086 HC STATISTIC MORPHOLOGY W/INTERP HEMEPATH TC 85060: Performed by: STUDENT IN AN ORGANIZED HEALTH CARE EDUCATION/TRAINING PROGRAM

## 2020-01-01 PROCEDURE — 93308 TTE F-UP OR LMTD: CPT | Mod: 26 | Performed by: EMERGENCY MEDICINE

## 2020-01-01 PROCEDURE — 82607 VITAMIN B-12: CPT | Performed by: STUDENT IN AN ORGANIZED HEALTH CARE EDUCATION/TRAINING PROGRAM

## 2020-01-01 PROCEDURE — 86140 C-REACTIVE PROTEIN: CPT | Performed by: EMERGENCY MEDICINE

## 2020-01-01 PROCEDURE — 250N000013 HC RX MED GY IP 250 OP 250 PS 637: Performed by: INTERNAL MEDICINE

## 2020-01-01 PROCEDURE — 84484 ASSAY OF TROPONIN QUANT: CPT | Performed by: EMERGENCY MEDICINE

## 2020-01-01 PROCEDURE — 36415 COLL VENOUS BLD VENIPUNCTURE: CPT | Performed by: EMERGENCY MEDICINE

## 2020-01-01 PROCEDURE — C9803 HOPD COVID-19 SPEC COLLECT: HCPCS

## 2020-01-01 PROCEDURE — 84132 ASSAY OF SERUM POTASSIUM: CPT | Performed by: FAMILY MEDICINE

## 2020-01-01 PROCEDURE — 83970 ASSAY OF PARATHORMONE: CPT | Performed by: FAMILY MEDICINE

## 2020-01-01 PROCEDURE — 83605 ASSAY OF LACTIC ACID: CPT | Performed by: INTERNAL MEDICINE

## 2020-01-01 PROCEDURE — 82330 ASSAY OF CALCIUM: CPT | Performed by: STUDENT IN AN ORGANIZED HEALTH CARE EDUCATION/TRAINING PROGRAM

## 2020-01-01 PROCEDURE — 84443 ASSAY THYROID STIM HORMONE: CPT | Performed by: EMERGENCY MEDICINE

## 2020-01-01 PROCEDURE — 93005 ELECTROCARDIOGRAM TRACING: CPT | Performed by: EMERGENCY MEDICINE

## 2020-01-01 PROCEDURE — 93010 ELECTROCARDIOGRAM REPORT: CPT | Performed by: INTERNAL MEDICINE

## 2020-01-01 PROCEDURE — 99232 SBSQ HOSP IP/OBS MODERATE 35: CPT | Mod: GC | Performed by: FAMILY MEDICINE

## 2020-01-01 PROCEDURE — 25800030 ZZH RX IP 258 OP 636: Performed by: STUDENT IN AN ORGANIZED HEALTH CARE EDUCATION/TRAINING PROGRAM

## 2020-01-01 PROCEDURE — 96361 HYDRATE IV INFUSION ADD-ON: CPT | Performed by: EMERGENCY MEDICINE

## 2020-01-01 PROCEDURE — 80048 BASIC METABOLIC PNL TOTAL CA: CPT | Performed by: STUDENT IN AN ORGANIZED HEALTH CARE EDUCATION/TRAINING PROGRAM

## 2020-01-01 PROCEDURE — 85045 AUTOMATED RETICULOCYTE COUNT: CPT | Performed by: STUDENT IN AN ORGANIZED HEALTH CARE EDUCATION/TRAINING PROGRAM

## 2020-01-01 PROCEDURE — 93306 TTE W/DOPPLER COMPLETE: CPT

## 2020-01-01 PROCEDURE — 87040 BLOOD CULTURE FOR BACTERIA: CPT | Performed by: STUDENT IN AN ORGANIZED HEALTH CARE EDUCATION/TRAINING PROGRAM

## 2020-01-01 PROCEDURE — 250N000009 HC RX 250: Performed by: EMERGENCY MEDICINE

## 2020-01-01 PROCEDURE — C9803 HOPD COVID-19 SPEC COLLECT: HCPCS | Performed by: INTERNAL MEDICINE

## 2020-01-01 PROCEDURE — 250N000013 HC RX MED GY IP 250 OP 250 PS 637: Performed by: FAMILY MEDICINE

## 2020-01-01 PROCEDURE — 74176 CT ABD & PELVIS W/O CONTRAST: CPT

## 2020-01-01 PROCEDURE — 83735 ASSAY OF MAGNESIUM: CPT | Performed by: STUDENT IN AN ORGANIZED HEALTH CARE EDUCATION/TRAINING PROGRAM

## 2020-01-01 PROCEDURE — 96374 THER/PROPH/DIAG INJ IV PUSH: CPT

## 2020-01-01 PROCEDURE — 93010 ELECTROCARDIOGRAM REPORT: CPT | Performed by: EMERGENCY MEDICINE

## 2020-01-01 PROCEDURE — 85025 COMPLETE CBC W/AUTO DIFF WBC: CPT | Performed by: EMERGENCY MEDICINE

## 2020-01-01 PROCEDURE — 99207 PR CDG-CHARGE REQUIRED MANUAL ENTRY: CPT | Performed by: STUDENT IN AN ORGANIZED HEALTH CARE EDUCATION/TRAINING PROGRAM

## 2020-01-01 PROCEDURE — 86140 C-REACTIVE PROTEIN: CPT | Performed by: INTERNAL MEDICINE

## 2020-01-01 PROCEDURE — 87205 SMEAR GRAM STAIN: CPT | Performed by: PODIATRIST

## 2020-01-01 PROCEDURE — 85018 HEMOGLOBIN: CPT | Performed by: INTERNAL MEDICINE

## 2020-01-01 PROCEDURE — 82525 ASSAY OF COPPER: CPT | Performed by: STUDENT IN AN ORGANIZED HEALTH CARE EDUCATION/TRAINING PROGRAM

## 2020-01-01 PROCEDURE — U0003 INFECTIOUS AGENT DETECTION BY NUCLEIC ACID (DNA OR RNA); SEVERE ACUTE RESPIRATORY SYNDROME CORONAVIRUS 2 (SARS-COV-2) (CORONAVIRUS DISEASE [COVID-19]), AMPLIFIED PROBE TECHNIQUE, MAKING USE OF HIGH THROUGHPUT TECHNOLOGIES AS DESCRIBED BY CMS-2020-01-R: HCPCS | Performed by: EMERGENCY MEDICINE

## 2020-01-01 PROCEDURE — 84425 ASSAY OF VITAMIN B-1: CPT | Performed by: STUDENT IN AN ORGANIZED HEALTH CARE EDUCATION/TRAINING PROGRAM

## 2020-01-01 PROCEDURE — 250N000011 HC RX IP 250 OP 636: Performed by: STUDENT IN AN ORGANIZED HEALTH CARE EDUCATION/TRAINING PROGRAM

## 2020-01-01 PROCEDURE — 99222 1ST HOSP IP/OBS MODERATE 55: CPT | Performed by: INTERNAL MEDICINE

## 2020-01-01 PROCEDURE — 83605 ASSAY OF LACTIC ACID: CPT | Performed by: STUDENT IN AN ORGANIZED HEALTH CARE EDUCATION/TRAINING PROGRAM

## 2020-01-01 PROCEDURE — 85027 COMPLETE CBC AUTOMATED: CPT | Performed by: INTERNAL MEDICINE

## 2020-01-01 PROCEDURE — 96374 THER/PROPH/DIAG INJ IV PUSH: CPT | Performed by: EMERGENCY MEDICINE

## 2020-01-01 PROCEDURE — 999N001017 HC STATISTIC GLUCOSE BY METER IP

## 2020-01-01 PROCEDURE — 80053 COMPREHEN METABOLIC PANEL: CPT | Performed by: EMERGENCY MEDICINE

## 2020-01-01 PROCEDURE — 80048 BASIC METABOLIC PNL TOTAL CA: CPT | Performed by: FAMILY MEDICINE

## 2020-01-01 PROCEDURE — 87075 CULTR BACTERIA EXCEPT BLOOD: CPT | Performed by: PODIATRIST

## 2020-01-01 PROCEDURE — C9113 INJ PANTOPRAZOLE SODIUM, VIA: HCPCS | Performed by: INTERNAL MEDICINE

## 2020-01-01 PROCEDURE — 250N000009 HC RX 250: Performed by: STUDENT IN AN ORGANIZED HEALTH CARE EDUCATION/TRAINING PROGRAM

## 2020-01-01 PROCEDURE — T1013 SIGN LANG/ORAL INTERPRETER: HCPCS | Mod: U3

## 2020-01-01 PROCEDURE — 84484 ASSAY OF TROPONIN QUANT: CPT | Performed by: STUDENT IN AN ORGANIZED HEALTH CARE EDUCATION/TRAINING PROGRAM

## 2020-01-01 PROCEDURE — 81003 URINALYSIS AUTO W/O SCOPE: CPT | Performed by: INTERNAL MEDICINE

## 2020-01-01 PROCEDURE — 99232 SBSQ HOSP IP/OBS MODERATE 35: CPT | Performed by: INTERNAL MEDICINE

## 2020-01-01 PROCEDURE — 87635 SARS-COV-2 COVID-19 AMP PRB: CPT | Performed by: STUDENT IN AN ORGANIZED HEALTH CARE EDUCATION/TRAINING PROGRAM

## 2020-01-01 PROCEDURE — 99233 SBSQ HOSP IP/OBS HIGH 50: CPT | Performed by: FAMILY MEDICINE

## 2020-01-01 PROCEDURE — 36415 COLL VENOUS BLD VENIPUNCTURE: CPT | Performed by: FAMILY MEDICINE

## 2020-01-01 PROCEDURE — 99214 OFFICE O/P EST MOD 30 MIN: CPT | Mod: 95 | Performed by: STUDENT IN AN ORGANIZED HEALTH CARE EDUCATION/TRAINING PROGRAM

## 2020-01-01 PROCEDURE — 76705 ECHO EXAM OF ABDOMEN: CPT | Mod: 26 | Performed by: INTERNAL MEDICINE

## 2020-01-01 PROCEDURE — 96360 HYDRATION IV INFUSION INIT: CPT | Performed by: INTERNAL MEDICINE

## 2020-01-01 PROCEDURE — 80053 COMPREHEN METABOLIC PANEL: CPT | Performed by: STUDENT IN AN ORGANIZED HEALTH CARE EDUCATION/TRAINING PROGRAM

## 2020-01-01 PROCEDURE — 99285 EMERGENCY DEPT VISIT HI MDM: CPT | Mod: 25 | Performed by: EMERGENCY MEDICINE

## 2020-01-01 PROCEDURE — G0378 HOSPITAL OBSERVATION PER HR: HCPCS

## 2020-01-01 PROCEDURE — 80053 COMPREHEN METABOLIC PANEL: CPT | Performed by: INTERNAL MEDICINE

## 2020-01-01 PROCEDURE — 99292 CRITICAL CARE ADDL 30 MIN: CPT | Mod: 25 | Performed by: EMERGENCY MEDICINE

## 2020-01-01 PROCEDURE — 82306 VITAMIN D 25 HYDROXY: CPT | Performed by: FAMILY MEDICINE

## 2020-01-01 PROCEDURE — 99239 HOSP IP/OBS DSCHRG MGMT >30: CPT | Performed by: FAMILY MEDICINE

## 2020-01-01 PROCEDURE — 87040 BLOOD CULTURE FOR BACTERIA: CPT | Performed by: EMERGENCY MEDICINE

## 2020-01-01 PROCEDURE — 250N000013 HC RX MED GY IP 250 OP 250 PS 637: Performed by: EMERGENCY MEDICINE

## 2020-01-01 PROCEDURE — 999N000127 HC STATISTIC PERIPHERAL IV START W US GUIDANCE

## 2020-01-01 PROCEDURE — 83605 ASSAY OF LACTIC ACID: CPT | Performed by: FAMILY MEDICINE

## 2020-01-01 PROCEDURE — 258N000003 HC RX IP 258 OP 636: Performed by: EMERGENCY MEDICINE

## 2020-01-01 PROCEDURE — 84630 ASSAY OF ZINC: CPT | Performed by: STUDENT IN AN ORGANIZED HEALTH CARE EDUCATION/TRAINING PROGRAM

## 2020-01-01 PROCEDURE — 97535 SELF CARE MNGMENT TRAINING: CPT | Mod: GO | Performed by: OCCUPATIONAL THERAPIST

## 2020-01-01 PROCEDURE — 94640 AIRWAY INHALATION TREATMENT: CPT | Performed by: EMERGENCY MEDICINE

## 2020-01-01 PROCEDURE — 70450 CT HEAD/BRAIN W/O DYE: CPT

## 2020-01-01 PROCEDURE — 84484 ASSAY OF TROPONIN QUANT: CPT | Performed by: INTERNAL MEDICINE

## 2020-01-01 PROCEDURE — 81001 URINALYSIS AUTO W/SCOPE: CPT | Performed by: EMERGENCY MEDICINE

## 2020-01-01 PROCEDURE — 25000128 H RX IP 250 OP 636: Performed by: STUDENT IN AN ORGANIZED HEALTH CARE EDUCATION/TRAINING PROGRAM

## 2020-01-01 PROCEDURE — 70450 CT HEAD/BRAIN W/O DYE: CPT | Mod: 26 | Performed by: RADIOLOGY

## 2020-01-01 PROCEDURE — C9803 HOPD COVID-19 SPEC COLLECT: HCPCS | Performed by: EMERGENCY MEDICINE

## 2020-01-01 PROCEDURE — 93005 ELECTROCARDIOGRAM TRACING: CPT

## 2020-01-01 PROCEDURE — 85610 PROTHROMBIN TIME: CPT | Performed by: INTERNAL MEDICINE

## 2020-01-01 PROCEDURE — 85652 RBC SED RATE AUTOMATED: CPT | Performed by: INTERNAL MEDICINE

## 2020-01-01 PROCEDURE — G0180 MD CERTIFICATION HHA PATIENT: HCPCS | Performed by: FAMILY MEDICINE

## 2020-01-01 PROCEDURE — 36415 COLL VENOUS BLD VENIPUNCTURE: CPT | Performed by: INTERNAL MEDICINE

## 2020-01-01 PROCEDURE — 83880 ASSAY OF NATRIURETIC PEPTIDE: CPT | Performed by: EMERGENCY MEDICINE

## 2020-01-01 PROCEDURE — 83735 ASSAY OF MAGNESIUM: CPT | Performed by: EMERGENCY MEDICINE

## 2020-01-01 PROCEDURE — 96376 TX/PRO/DX INJ SAME DRUG ADON: CPT | Performed by: EMERGENCY MEDICINE

## 2020-01-01 PROCEDURE — 82746 ASSAY OF FOLIC ACID SERUM: CPT | Performed by: STUDENT IN AN ORGANIZED HEALTH CARE EDUCATION/TRAINING PROGRAM

## 2020-01-01 PROCEDURE — 99238 HOSP IP/OBS DSCHRG MGMT 30/<: CPT | Performed by: STUDENT IN AN ORGANIZED HEALTH CARE EDUCATION/TRAINING PROGRAM

## 2020-01-01 PROCEDURE — U0003 INFECTIOUS AGENT DETECTION BY NUCLEIC ACID (DNA OR RNA); SEVERE ACUTE RESPIRATORY SYNDROME CORONAVIRUS 2 (SARS-COV-2) (CORONAVIRUS DISEASE [COVID-19]), AMPLIFIED PROBE TECHNIQUE, MAKING USE OF HIGH THROUGHPUT TECHNOLOGIES AS DESCRIBED BY CMS-2020-01-R: HCPCS | Performed by: STUDENT IN AN ORGANIZED HEALTH CARE EDUCATION/TRAINING PROGRAM

## 2020-01-01 PROCEDURE — 84145 PROCALCITONIN (PCT): CPT | Performed by: EMERGENCY MEDICINE

## 2020-01-01 PROCEDURE — 99291 CRITICAL CARE FIRST HOUR: CPT | Mod: 25 | Performed by: EMERGENCY MEDICINE

## 2020-01-01 PROCEDURE — 71045 X-RAY EXAM CHEST 1 VIEW: CPT

## 2020-01-01 PROCEDURE — 250N000011 HC RX IP 250 OP 636: Performed by: EMERGENCY MEDICINE

## 2020-01-01 PROCEDURE — 999N000157 HC STATISTIC RCP TIME EA 10 MIN

## 2020-01-01 PROCEDURE — 25800030 ZZH RX IP 258 OP 636: Performed by: INTERNAL MEDICINE

## 2020-01-01 PROCEDURE — 87077 CULTURE AEROBIC IDENTIFY: CPT | Performed by: PODIATRIST

## 2020-01-01 PROCEDURE — 84100 ASSAY OF PHOSPHORUS: CPT | Performed by: FAMILY MEDICINE

## 2020-01-01 PROCEDURE — 71046 X-RAY EXAM CHEST 2 VIEWS: CPT | Mod: 26 | Performed by: RADIOLOGY

## 2020-01-01 PROCEDURE — 250N000011 HC RX IP 250 OP 636: Performed by: INTERNAL MEDICINE

## 2020-01-01 PROCEDURE — 999N000128 HC STATISTIC PERIPHERAL IV START W/O US GUIDANCE

## 2020-01-01 PROCEDURE — 71046 X-RAY EXAM CHEST 2 VIEWS: CPT

## 2020-01-01 PROCEDURE — 93306 TTE W/DOPPLER COMPLETE: CPT | Mod: 26 | Performed by: INTERNAL MEDICINE

## 2020-01-01 PROCEDURE — 87070 CULTURE OTHR SPECIMN AEROBIC: CPT | Performed by: PODIATRIST

## 2020-01-01 PROCEDURE — 83690 ASSAY OF LIPASE: CPT | Performed by: EMERGENCY MEDICINE

## 2020-01-01 PROCEDURE — 83690 ASSAY OF LIPASE: CPT | Performed by: INTERNAL MEDICINE

## 2020-01-01 PROCEDURE — 96361 HYDRATE IV INFUSION ADD-ON: CPT | Performed by: INTERNAL MEDICINE

## 2020-01-01 PROCEDURE — 76705 ECHO EXAM OF ABDOMEN: CPT | Performed by: INTERNAL MEDICINE

## 2020-01-01 PROCEDURE — 84145 PROCALCITONIN (PCT): CPT | Performed by: STUDENT IN AN ORGANIZED HEALTH CARE EDUCATION/TRAINING PROGRAM

## 2020-01-01 RX ORDER — LANSOPRAZOLE 30 MG/1
30 CAPSULE, DELAYED RELEASE ORAL DAILY
Status: DISCONTINUED | OUTPATIENT
Start: 2020-01-01 | End: 2020-01-01

## 2020-01-01 RX ORDER — SODIUM CHLORIDE 9 MG/ML
INJECTION, SOLUTION INTRAVENOUS CONTINUOUS
Status: DISCONTINUED | OUTPATIENT
Start: 2020-01-01 | End: 2020-01-01

## 2020-01-01 RX ORDER — SERTRALINE HYDROCHLORIDE 25 MG/1
50 TABLET, FILM COATED ORAL DAILY
Qty: 60 TABLET | Refills: 3 | Status: ON HOLD | OUTPATIENT
Start: 2020-01-01 | End: 2020-01-01

## 2020-01-01 RX ORDER — METRONIDAZOLE 500 MG/1
500 TABLET ORAL 3 TIMES DAILY
Qty: 126 TABLET | Refills: 0 | Status: SHIPPED | OUTPATIENT
Start: 2020-01-01 | End: 2020-01-01 | Stop reason: SINTOL

## 2020-01-01 RX ORDER — ONDANSETRON 2 MG/ML
4 INJECTION INTRAMUSCULAR; INTRAVENOUS EVERY 6 HOURS PRN
Status: DISCONTINUED | OUTPATIENT
Start: 2020-01-01 | End: 2020-01-01 | Stop reason: HOSPADM

## 2020-01-01 RX ORDER — POLYETHYLENE GLYCOL 3350 17 G/17G
17 POWDER, FOR SOLUTION ORAL DAILY
Status: DISCONTINUED | OUTPATIENT
Start: 2020-01-01 | End: 2020-01-01

## 2020-01-01 RX ORDER — FAMOTIDINE 10 MG/1
TABLET, FILM COATED ORAL
Status: ON HOLD | COMMUNITY
Start: 2020-01-01 | End: 2020-01-01

## 2020-01-01 RX ORDER — FAMOTIDINE 20 MG/1
20 TABLET, FILM COATED ORAL EVERY MORNING
Status: DISCONTINUED | OUTPATIENT
Start: 2020-01-01 | End: 2020-01-01

## 2020-01-01 RX ORDER — DEXTROSE MONOHYDRATE, SODIUM CHLORIDE, AND POTASSIUM CHLORIDE 50; .745; 4.5 G/1000ML; G/1000ML; G/1000ML
INJECTION, SOLUTION INTRAVENOUS CONTINUOUS
Status: DISCONTINUED | OUTPATIENT
Start: 2020-01-01 | End: 2020-01-01

## 2020-01-01 RX ORDER — TRAMADOL HYDROCHLORIDE 50 MG/1
TABLET ORAL
COMMUNITY
Start: 2020-01-01 | End: 2020-01-01

## 2020-01-01 RX ORDER — LORATADINE 10 MG/1
10 TABLET ORAL EVERY EVENING
Status: DISCONTINUED | OUTPATIENT
Start: 2020-01-01 | End: 2020-01-01

## 2020-01-01 RX ORDER — BENZTROPINE MESYLATE 1 MG/1
1-2 TABLET ORAL 3 TIMES DAILY PRN
Status: DISCONTINUED | OUTPATIENT
Start: 2020-01-01 | End: 2020-01-01

## 2020-01-01 RX ORDER — PVA/GENTIAN VIOLET/METHYL BLUE 6" X 6"
1 BANDAGE TOPICAL DAILY
Status: DISCONTINUED | OUTPATIENT
Start: 2020-01-01 | End: 2020-01-01

## 2020-01-01 RX ORDER — TRAMADOL HYDROCHLORIDE 50 MG/1
50 TABLET ORAL 2 TIMES DAILY PRN
Status: ON HOLD | COMMUNITY
End: 2020-01-01

## 2020-01-01 RX ORDER — POLYETHYLENE GLYCOL 3350 17 G/17G
17 POWDER, FOR SOLUTION ORAL DAILY PRN
Status: DISCONTINUED | OUTPATIENT
Start: 2020-01-01 | End: 2020-01-01

## 2020-01-01 RX ORDER — MULTIVITAMIN,THERAPEUTIC
1 TABLET ORAL DAILY
Status: DISCONTINUED | OUTPATIENT
Start: 2020-01-01 | End: 2020-01-01

## 2020-01-01 RX ORDER — POLYETHYLENE GLYCOL 3350 17 G/17G
1 POWDER, FOR SOLUTION ORAL DAILY PRN
DISCHARGE
Start: 2020-01-01

## 2020-01-01 RX ORDER — AMLODIPINE BESYLATE 10 MG/1
TABLET ORAL
COMMUNITY
Start: 2020-01-01 | End: 2020-01-01

## 2020-01-01 RX ORDER — DOXYCYCLINE 100 MG/10ML
100 INJECTION, POWDER, LYOPHILIZED, FOR SOLUTION INTRAVENOUS EVERY 12 HOURS
Status: COMPLETED | OUTPATIENT
Start: 2020-01-01 | End: 2020-01-01

## 2020-01-01 RX ORDER — PANTOPRAZOLE SODIUM 40 MG/1
40 TABLET, DELAYED RELEASE ORAL
Status: DISCONTINUED | OUTPATIENT
Start: 2020-01-01 | End: 2020-01-01

## 2020-01-01 RX ORDER — LOSARTAN POTASSIUM 50 MG/1
TABLET ORAL
COMMUNITY
Start: 2020-01-01 | End: 2020-01-01

## 2020-01-01 RX ORDER — POTASSIUM CHLORIDE 750 MG/1
20 TABLET, EXTENDED RELEASE ORAL DAILY
Qty: 60 TABLET | Refills: 3 | Status: ON HOLD | OUTPATIENT
Start: 2020-01-01 | End: 2020-01-01

## 2020-01-01 RX ORDER — ONDANSETRON 4 MG/1
4 TABLET, ORALLY DISINTEGRATING ORAL EVERY 6 HOURS PRN
Status: DISCONTINUED | OUTPATIENT
Start: 2020-01-01 | End: 2020-01-01 | Stop reason: HOSPADM

## 2020-01-01 RX ORDER — ACETAMINOPHEN 325 MG/1
650 TABLET ORAL EVERY 4 HOURS PRN
Status: DISCONTINUED | OUTPATIENT
Start: 2020-01-01 | End: 2020-01-01 | Stop reason: HOSPADM

## 2020-01-01 RX ORDER — POTASSIUM CHLORIDE 750 MG/1
TABLET, EXTENDED RELEASE ORAL
COMMUNITY
Start: 2020-01-01

## 2020-01-01 RX ORDER — IBUPROFEN 200 MG
950 CAPSULE ORAL 2 TIMES DAILY
Status: DISCONTINUED | OUTPATIENT
Start: 2020-01-01 | End: 2020-01-01

## 2020-01-01 RX ORDER — PANTOPRAZOLE SODIUM 20 MG/1
40 TABLET, DELAYED RELEASE ORAL DAILY
Status: DISCONTINUED | OUTPATIENT
Start: 2020-01-01 | End: 2020-01-01 | Stop reason: HOSPADM

## 2020-01-01 RX ORDER — LORATADINE 10 MG/1
10 TABLET ORAL DAILY
Qty: 90 TABLET | Refills: 3 | Status: SHIPPED | OUTPATIENT
Start: 2020-01-01

## 2020-01-01 RX ORDER — FUROSEMIDE 20 MG
20 TABLET ORAL EVERY OTHER DAY
Qty: 15 TABLET | Refills: 0 | Status: SHIPPED | OUTPATIENT
Start: 2020-01-01 | End: 2020-01-01

## 2020-01-01 RX ORDER — FUROSEMIDE 20 MG
20 TABLET ORAL EVERY OTHER DAY
Qty: 15 TABLET | Refills: 3 | Status: ON HOLD | OUTPATIENT
Start: 2020-01-01 | End: 2020-01-01

## 2020-01-01 RX ORDER — FAMOTIDINE 20 MG/1
20 TABLET, FILM COATED ORAL DAILY
Qty: 90 TABLET | Refills: 3 | Status: SHIPPED | OUTPATIENT
Start: 2020-01-01 | End: 2020-01-01

## 2020-01-01 RX ORDER — LIDOCAINE 40 MG/G
CREAM TOPICAL
Qty: 120 G | Refills: 1 | Status: SHIPPED | OUTPATIENT
Start: 2020-01-01 | End: 2020-01-01

## 2020-01-01 RX ORDER — UREA 10 %
500 LOTION (ML) TOPICAL DAILY
Status: DISCONTINUED | OUTPATIENT
Start: 2020-01-01 | End: 2020-01-01

## 2020-01-01 RX ORDER — LIDOCAINE 40 MG/G
CREAM TOPICAL
Status: DISCONTINUED | OUTPATIENT
Start: 2020-01-01 | End: 2020-01-01

## 2020-01-01 RX ORDER — FAMOTIDINE 10 MG
10 TABLET ORAL DAILY PRN
COMMUNITY

## 2020-01-01 RX ORDER — TRAMADOL HYDROCHLORIDE 50 MG/1
50 TABLET ORAL 2 TIMES DAILY
Qty: 10 TABLET | Refills: 0 | Status: SHIPPED | OUTPATIENT
Start: 2020-01-01 | End: 2020-01-01

## 2020-01-01 RX ORDER — POTASSIUM CHLORIDE 7.45 MG/ML
10 INJECTION INTRAVENOUS
Status: DISCONTINUED | OUTPATIENT
Start: 2020-01-01 | End: 2020-01-01

## 2020-01-01 RX ORDER — POTASSIUM CHLORIDE 29.8 MG/ML
20 INJECTION INTRAVENOUS
Status: DISCONTINUED | OUTPATIENT
Start: 2020-01-01 | End: 2020-01-01

## 2020-01-01 RX ORDER — FUROSEMIDE 20 MG
20 TABLET ORAL DAILY
Qty: 30 TABLET | Refills: 0 | Status: SHIPPED | OUTPATIENT
Start: 2020-01-01 | End: 2020-01-01

## 2020-01-01 RX ORDER — PVA/GENTIAN VIOLET/METHYL BLUE 6" X 6"
1 BANDAGE TOPICAL DAILY
Qty: 10 EACH | Refills: 1 | Status: ON HOLD | OUTPATIENT
Start: 2020-01-01 | End: 2020-01-01

## 2020-01-01 RX ORDER — CAPSAICIN 0.025 %
CREAM (GRAM) TOPICAL 3 TIMES DAILY
COMMUNITY
End: 2020-01-01

## 2020-01-01 RX ORDER — TROLAMINE SALICYLATE 10 G/100G
CREAM TOPICAL 4 TIMES DAILY PRN
COMMUNITY

## 2020-01-01 RX ORDER — HYDROMORPHONE HYDROCHLORIDE 1 MG/ML
0.2 INJECTION, SOLUTION INTRAMUSCULAR; INTRAVENOUS; SUBCUTANEOUS
Status: DISCONTINUED | OUTPATIENT
Start: 2020-01-01 | End: 2020-01-01 | Stop reason: HOSPADM

## 2020-01-01 RX ORDER — MORPHINE SULFATE 2 MG/ML
2 INJECTION, SOLUTION INTRAMUSCULAR; INTRAVENOUS
Status: DISCONTINUED | OUTPATIENT
Start: 2020-01-01 | End: 2020-01-01 | Stop reason: ALTCHOICE

## 2020-01-01 RX ORDER — POTASSIUM CHLORIDE 750 MG/1
10 TABLET, EXTENDED RELEASE ORAL DAILY
Qty: 30 TABLET | Refills: 0 | Status: SHIPPED | OUTPATIENT
Start: 2020-01-01 | End: 2020-01-01

## 2020-01-01 RX ORDER — FAMOTIDINE 20 MG/1
20 TABLET, FILM COATED ORAL DAILY PRN
Status: DISCONTINUED | OUTPATIENT
Start: 2020-01-01 | End: 2020-01-01

## 2020-01-01 RX ORDER — LEVOFLOXACIN 750 MG/1
750 TABLET, FILM COATED ORAL EVERY OTHER DAY
Qty: 8 TABLET | Refills: 0 | Status: ON HOLD | OUTPATIENT
Start: 2020-01-01 | End: 2020-01-01

## 2020-01-01 RX ORDER — POTASSIUM CHLORIDE 1.5 G/1.58G
20-40 POWDER, FOR SOLUTION ORAL
Status: DISCONTINUED | OUTPATIENT
Start: 2020-01-01 | End: 2020-01-01

## 2020-01-01 RX ORDER — ALUMINA, MAGNESIA, AND SIMETHICONE 2400; 2400; 240 MG/30ML; MG/30ML; MG/30ML
30 SUSPENSION ORAL EVERY 4 HOURS PRN
DISCHARGE
Start: 2020-01-01

## 2020-01-01 RX ORDER — ALBUTEROL SULFATE 0.83 MG/ML
SOLUTION RESPIRATORY (INHALATION)
Status: DISCONTINUED
Start: 2020-01-01 | End: 2020-01-01 | Stop reason: HOSPADM

## 2020-01-01 RX ORDER — TRIAMCINOLONE ACETONIDE 1 MG/G
CREAM TOPICAL 3 TIMES DAILY
Status: ON HOLD | COMMUNITY
End: 2020-01-01

## 2020-01-01 RX ORDER — ACETAMINOPHEN 500 MG
1000 TABLET ORAL 3 TIMES DAILY
Status: DISCONTINUED | OUTPATIENT
Start: 2020-01-01 | End: 2020-01-01 | Stop reason: HOSPADM

## 2020-01-01 RX ORDER — SERTRALINE HYDROCHLORIDE 25 MG/1
25 TABLET, FILM COATED ORAL DAILY
Status: DISCONTINUED | OUTPATIENT
Start: 2020-01-01 | End: 2020-01-01

## 2020-01-01 RX ORDER — PEDI MULTIVIT NO.128/VITAMIN K 500 MCG/ML
1 LIQUID (ML) ORAL DAILY
Status: DISCONTINUED | OUTPATIENT
Start: 2020-01-01 | End: 2020-01-01

## 2020-01-01 RX ORDER — ALUMINA, MAGNESIA, AND SIMETHICONE 2400; 2400; 240 MG/30ML; MG/30ML; MG/30ML
30 SUSPENSION ORAL EVERY 4 HOURS PRN
Status: DISCONTINUED | OUTPATIENT
Start: 2020-01-01 | End: 2020-01-01 | Stop reason: HOSPADM

## 2020-01-01 RX ORDER — LEVOFLOXACIN 5 MG/ML
500 INJECTION, SOLUTION INTRAVENOUS
Status: COMPLETED | OUTPATIENT
Start: 2020-01-01 | End: 2020-01-01

## 2020-01-01 RX ORDER — CHOLECALCIFEROL (VITAMIN D3) 1250 MCG
1250 CAPSULE ORAL
Status: DISCONTINUED | OUTPATIENT
Start: 2020-01-01 | End: 2020-01-01

## 2020-01-01 RX ORDER — FAMOTIDINE 20 MG/1
20 TABLET, FILM COATED ORAL EVERY MORNING
Qty: 90 TABLET | Refills: 3 | Status: ON HOLD | COMMUNITY
Start: 2020-01-01 | End: 2020-01-01

## 2020-01-01 RX ORDER — CLINDAMYCIN HCL 300 MG
300 CAPSULE ORAL 3 TIMES DAILY
Qty: 21 CAPSULE | Refills: 0 | Status: SHIPPED | OUTPATIENT
Start: 2020-01-01 | End: 2020-01-01

## 2020-01-01 RX ORDER — OXYCODONE HYDROCHLORIDE 5 MG/1
2.5 TABLET ORAL 3 TIMES DAILY PRN
Qty: 32 TABLET | Refills: 0 | Status: ON HOLD | OUTPATIENT
Start: 2020-01-01 | End: 2020-01-01

## 2020-01-01 RX ORDER — LEVOFLOXACIN 750 MG/1
750 TABLET, FILM COATED ORAL EVERY OTHER DAY
Status: ON HOLD | COMMUNITY
Start: 2020-01-01 | End: 2020-01-01

## 2020-01-01 RX ORDER — OXYCODONE HYDROCHLORIDE 5 MG/1
2.5 TABLET ORAL 3 TIMES DAILY PRN
Qty: 32 TABLET | Refills: 0 | Status: SHIPPED | OUTPATIENT
Start: 2020-01-01 | End: 2020-01-01

## 2020-01-01 RX ORDER — OXYCODONE HYDROCHLORIDE 5 MG/1
2.5 TABLET ORAL 3 TIMES DAILY PRN
Qty: 32 TABLET | Refills: 0 | OUTPATIENT
Start: 2020-01-01 | End: 2024-08-08

## 2020-01-01 RX ORDER — AMOXICILLIN AND CLAVULANATE POTASSIUM 500; 125 MG/1; MG/1
1 TABLET, FILM COATED ORAL 2 TIMES DAILY
Qty: 10 TABLET | Refills: 0 | Status: SHIPPED | OUTPATIENT
Start: 2020-01-01 | End: 2020-01-01

## 2020-01-01 RX ORDER — LOSARTAN POTASSIUM 50 MG/1
TABLET ORAL
Qty: 90 TABLET | Refills: 3 | Status: ON HOLD | COMMUNITY
Start: 2020-01-01 | End: 2020-01-01

## 2020-01-01 RX ORDER — ACETAMINOPHEN 500 MG
1000 TABLET ORAL 3 TIMES DAILY
Status: DISCONTINUED | OUTPATIENT
Start: 2020-01-01 | End: 2020-01-01

## 2020-01-01 RX ORDER — LACTOSE-REDUCED FOOD
1 LIQUID (ML) ORAL
Qty: 237 ML | Refills: 90 | Status: SHIPPED | OUTPATIENT
Start: 2020-01-01

## 2020-01-01 RX ORDER — LIDOCAINE 4 G/G
1 PATCH TOPICAL EVERY 24 HOURS
Status: DISCONTINUED | OUTPATIENT
Start: 2020-01-01 | End: 2020-01-01 | Stop reason: HOSPADM

## 2020-01-01 RX ORDER — LIDOCAINE 40 MG/G
CREAM TOPICAL
Status: DISCONTINUED | OUTPATIENT
Start: 2020-01-01 | End: 2020-01-01 | Stop reason: HOSPADM

## 2020-01-01 RX ORDER — TRAMADOL HYDROCHLORIDE 50 MG/1
50 TABLET ORAL 2 TIMES DAILY PRN
Qty: 60 TABLET | Refills: 0 | Status: SHIPPED | OUTPATIENT
Start: 2020-01-01 | End: 2020-01-01 | Stop reason: ALTCHOICE

## 2020-01-01 RX ORDER — DIPHENHYDRAMINE HCL 25 MG
CAPSULE ORAL
Qty: 30 ML | Refills: 3 | Status: SHIPPED | OUTPATIENT
Start: 2020-01-01

## 2020-01-01 RX ORDER — LACTOSE-REDUCED FOOD
1 LIQUID (ML) ORAL
Qty: 237 ML | Refills: 90 | Status: SHIPPED | OUTPATIENT
Start: 2020-01-01 | End: 2020-01-01

## 2020-01-01 RX ORDER — LIDOCAINE 4 G/G
1 PATCH TOPICAL EVERY 24 HOURS
Qty: 10 PATCH | Refills: 3 | Status: SHIPPED | OUTPATIENT
Start: 2020-01-01 | End: 2020-01-01

## 2020-01-01 RX ORDER — MIRTAZAPINE 15 MG/1
15 TABLET, ORALLY DISINTEGRATING ORAL AT BEDTIME
DISCHARGE
Start: 2020-01-01 | End: 2020-01-01

## 2020-01-01 RX ORDER — MIRTAZAPINE 15 MG/1
15 TABLET, ORALLY DISINTEGRATING ORAL AT BEDTIME
Status: DISCONTINUED | OUTPATIENT
Start: 2020-01-01 | End: 2020-01-01

## 2020-01-01 RX ORDER — FLUCONAZOLE 200 MG/1
200 TABLET ORAL ONCE
Status: COMPLETED | OUTPATIENT
Start: 2020-01-01 | End: 2020-01-01

## 2020-01-01 RX ORDER — HYDROCHLOROTHIAZIDE 12.5 MG/1
TABLET ORAL EVERY MORNING
COMMUNITY
Start: 2020-01-01

## 2020-01-01 RX ORDER — DOXYCYCLINE 100 MG/1
100 CAPSULE ORAL 2 TIMES DAILY
Status: DISCONTINUED | OUTPATIENT
Start: 2020-01-01 | End: 2020-01-01

## 2020-01-01 RX ORDER — TRAMADOL HYDROCHLORIDE 50 MG/1
50 TABLET ORAL 2 TIMES DAILY PRN
Qty: 60 TABLET | Refills: 0 | Status: SHIPPED | OUTPATIENT
Start: 2020-01-01 | End: 2020-01-01

## 2020-01-01 RX ORDER — HYDROMORPHONE HYDROCHLORIDE 2 MG/1
2 TABLET ORAL
Status: DISCONTINUED | OUTPATIENT
Start: 2020-01-01 | End: 2020-01-01 | Stop reason: HOSPADM

## 2020-01-01 RX ORDER — ALBUTEROL SULFATE 0.83 MG/ML
2.5 SOLUTION RESPIRATORY (INHALATION) ONCE
Status: COMPLETED | OUTPATIENT
Start: 2020-01-01 | End: 2020-01-01

## 2020-01-01 RX ORDER — MIRTAZAPINE 15 MG/1
15 TABLET, ORALLY DISINTEGRATING ORAL AT BEDTIME
Status: DISCONTINUED | OUTPATIENT
Start: 2020-01-01 | End: 2020-01-01 | Stop reason: HOSPADM

## 2020-01-01 RX ORDER — ACETAMINOPHEN 325 MG/10.15ML
650 LIQUID ORAL EVERY 4 HOURS PRN
Status: DISCONTINUED | OUTPATIENT
Start: 2020-01-01 | End: 2020-01-01 | Stop reason: HOSPADM

## 2020-01-01 RX ORDER — CAPSAICIN 0.025 %
1 CREAM (GRAM) TOPICAL 3 TIMES DAILY PRN
Qty: 25 G | Refills: 3 | Status: SHIPPED | OUTPATIENT
Start: 2020-01-01 | End: 2020-01-01

## 2020-01-01 RX ORDER — SODIUM CHLORIDE, SODIUM LACTATE, POTASSIUM CHLORIDE, CALCIUM CHLORIDE 600; 310; 30; 20 MG/100ML; MG/100ML; MG/100ML; MG/100ML
INJECTION, SOLUTION INTRAVENOUS CONTINUOUS
Status: DISCONTINUED | OUTPATIENT
Start: 2020-01-01 | End: 2020-01-01 | Stop reason: HOSPADM

## 2020-01-01 RX ORDER — NALOXONE HYDROCHLORIDE 0.4 MG/ML
.1-.4 INJECTION, SOLUTION INTRAMUSCULAR; INTRAVENOUS; SUBCUTANEOUS
Status: DISCONTINUED | OUTPATIENT
Start: 2020-01-01 | End: 2020-01-01 | Stop reason: HOSPADM

## 2020-01-01 RX ORDER — FUROSEMIDE 20 MG
20 TABLET ORAL EVERY OTHER DAY
Qty: 4 TABLET | Refills: 0 | Status: SHIPPED | OUTPATIENT
Start: 2020-01-01 | End: 2020-01-01

## 2020-01-01 RX ORDER — FUROSEMIDE 20 MG
20 TABLET ORAL DAILY
Qty: 30 TABLET | Refills: 3 | Status: SHIPPED | OUTPATIENT
Start: 2020-01-01

## 2020-01-01 RX ORDER — FUROSEMIDE 20 MG
20 TABLET ORAL EVERY OTHER DAY
Qty: 3 TABLET | Refills: 0 | Status: SHIPPED | OUTPATIENT
Start: 2020-01-01 | End: 2020-01-01

## 2020-01-01 RX ORDER — TRAMADOL HYDROCHLORIDE 50 MG/1
50 TABLET ORAL 2 TIMES DAILY PRN
Qty: 60 TABLET | Refills: 0 | Status: CANCELLED | OUTPATIENT
Start: 2020-01-01

## 2020-01-01 RX ORDER — FUROSEMIDE 20 MG
20 TABLET ORAL EVERY OTHER DAY
Qty: 15 TABLET | Refills: 0 | OUTPATIENT
Start: 2020-01-01

## 2020-01-01 RX ORDER — LOPERAMIDE HCL 1 MG/7.5ML
2 SUSPENSION ORAL 4 TIMES DAILY PRN
DISCHARGE
Start: 2020-01-01 | End: 2020-01-01

## 2020-01-01 RX ORDER — FENTANYL CITRATE 50 UG/ML
25-50 INJECTION, SOLUTION INTRAMUSCULAR; INTRAVENOUS
Status: DISCONTINUED | OUTPATIENT
Start: 2020-01-01 | End: 2020-01-01 | Stop reason: HOSPADM

## 2020-01-01 RX ORDER — FUROSEMIDE 10 MG/ML
40 INJECTION INTRAMUSCULAR; INTRAVENOUS ONCE
Status: COMPLETED | OUTPATIENT
Start: 2020-01-01 | End: 2020-01-01

## 2020-01-01 RX ORDER — AMOXICILLIN 250 MG
1 CAPSULE ORAL 2 TIMES DAILY PRN
Status: DISCONTINUED | OUTPATIENT
Start: 2020-01-01 | End: 2020-01-01 | Stop reason: HOSPADM

## 2020-01-01 RX ORDER — FAMOTIDINE 20 MG/1
20 TABLET, FILM COATED ORAL DAILY
Status: DISCONTINUED | OUTPATIENT
Start: 2020-01-01 | End: 2020-01-01 | Stop reason: HOSPADM

## 2020-01-01 RX ORDER — GLIPIZIDE 10 MG/1
1 TABLET ORAL 4 TIMES DAILY PRN
Status: DISCONTINUED | OUTPATIENT
Start: 2020-01-01 | End: 2020-01-01 | Stop reason: HOSPADM

## 2020-01-01 RX ORDER — LEVOFLOXACIN 5 MG/ML
250 INJECTION, SOLUTION INTRAVENOUS ONCE
Status: DISCONTINUED | OUTPATIENT
Start: 2020-01-01 | End: 2020-01-01

## 2020-01-01 RX ORDER — TRAMADOL HYDROCHLORIDE 50 MG/1
25 TABLET ORAL
Qty: 15 TABLET | Refills: 0 | Status: SHIPPED | OUTPATIENT
Start: 2020-01-01 | End: 2020-01-01

## 2020-01-01 RX ORDER — NALOXONE HYDROCHLORIDE 0.4 MG/ML
.1-.4 INJECTION, SOLUTION INTRAMUSCULAR; INTRAVENOUS; SUBCUTANEOUS
Status: DISCONTINUED | OUTPATIENT
Start: 2020-01-01 | End: 2020-01-01

## 2020-01-01 RX ORDER — DOXYCYCLINE 100 MG/1
100 CAPSULE ORAL 2 TIMES DAILY
Status: DISCONTINUED | OUTPATIENT
Start: 2020-01-01 | End: 2020-01-01 | Stop reason: HOSPADM

## 2020-01-01 RX ORDER — LEVOFLOXACIN 750 MG/1
750 TABLET, FILM COATED ORAL EVERY OTHER DAY
Qty: 7 TABLET | Refills: 0 | Status: SHIPPED | OUTPATIENT
Start: 2020-01-01 | End: 2020-01-01

## 2020-01-01 RX ORDER — FUROSEMIDE 10 MG/ML
20 INJECTION INTRAMUSCULAR; INTRAVENOUS EVERY 12 HOURS
Status: DISCONTINUED | OUTPATIENT
Start: 2020-01-01 | End: 2020-01-01

## 2020-01-01 RX ORDER — ACETAMINOPHEN 500 MG
1000 TABLET ORAL
COMMUNITY
Start: 2020-01-01 | End: 2020-01-01 | Stop reason: DRUGHIGH

## 2020-01-01 RX ORDER — ONDANSETRON 2 MG/ML
4 INJECTION INTRAMUSCULAR; INTRAVENOUS EVERY 6 HOURS PRN
Status: DISCONTINUED | OUTPATIENT
Start: 2020-01-01 | End: 2020-01-01

## 2020-01-01 RX ORDER — LANOLIN ALCOHOL/MO/W.PET/CERES
100 CREAM (GRAM) TOPICAL DAILY
Status: DISCONTINUED | OUTPATIENT
Start: 2020-01-01 | End: 2020-01-01

## 2020-01-01 RX ORDER — DOXYCYCLINE HYCLATE 100 MG
100 TABLET ORAL 2 TIMES DAILY
Qty: 84 TABLET | Refills: 0 | Status: ON HOLD | OUTPATIENT
Start: 2020-01-01 | End: 2020-01-01

## 2020-01-01 RX ORDER — MAGNESIUM SULFATE HEPTAHYDRATE 40 MG/ML
4 INJECTION, SOLUTION INTRAVENOUS EVERY 4 HOURS PRN
Status: DISCONTINUED | OUTPATIENT
Start: 2020-01-01 | End: 2020-01-01

## 2020-01-01 RX ORDER — LIDOCAINE 40 MG/G
CREAM TOPICAL EVERY 12 HOURS
COMMUNITY
Start: 2020-01-01 | End: 2020-01-01

## 2020-01-01 RX ORDER — LEVOFLOXACIN 5 MG/ML
750 INJECTION, SOLUTION INTRAVENOUS EVERY 24 HOURS
Status: DISCONTINUED | OUTPATIENT
Start: 2020-01-01 | End: 2020-01-01

## 2020-01-01 RX ORDER — MIRTAZAPINE 15 MG/1
7.5 TABLET, ORALLY DISINTEGRATING ORAL AT BEDTIME
Status: DISCONTINUED | OUTPATIENT
Start: 2020-01-01 | End: 2020-01-01

## 2020-01-01 RX ORDER — CICLOPIROX 80 MG/ML
1 SOLUTION TOPICAL AT BEDTIME
Status: ON HOLD | COMMUNITY
End: 2020-01-01

## 2020-01-01 RX ORDER — ACETAMINOPHEN 325 MG/1
325-650 TABLET ORAL EVERY 6 HOURS PRN
Qty: 1 BOTTLE | Refills: 3 | Status: SHIPPED | OUTPATIENT
Start: 2020-01-01

## 2020-01-01 RX ORDER — LANSOPRAZOLE 30 MG/1
30 TABLET, ORALLY DISINTEGRATING, DELAYED RELEASE ORAL DAILY
Qty: 90 TABLET | Refills: 0 | Status: SHIPPED | OUTPATIENT
Start: 2020-01-01

## 2020-01-01 RX ORDER — ZINC SULFATE 50(220)MG
220 CAPSULE ORAL DAILY
Status: DISCONTINUED | OUTPATIENT
Start: 2020-01-01 | End: 2020-01-01

## 2020-01-01 RX ORDER — ALBUTEROL SULFATE 0.83 MG/ML
2.5 SOLUTION RESPIRATORY (INHALATION)
Status: DISCONTINUED | OUTPATIENT
Start: 2020-01-01 | End: 2020-01-01 | Stop reason: HOSPADM

## 2020-01-01 RX ORDER — HYDROMORPHONE HYDROCHLORIDE 1 MG/ML
0.5 INJECTION, SOLUTION INTRAMUSCULAR; INTRAVENOUS; SUBCUTANEOUS ONCE
Status: COMPLETED | OUTPATIENT
Start: 2020-01-01 | End: 2020-01-01

## 2020-01-01 RX ORDER — LIDOCAINE 4 G/G
1 PATCH TOPICAL EVERY 24 HOURS
COMMUNITY

## 2020-01-01 RX ORDER — AMOXICILLIN 250 MG
2 CAPSULE ORAL 2 TIMES DAILY PRN
Status: DISCONTINUED | OUTPATIENT
Start: 2020-01-01 | End: 2020-01-01 | Stop reason: HOSPADM

## 2020-01-01 RX ORDER — ONDANSETRON 2 MG/ML
4 INJECTION INTRAMUSCULAR; INTRAVENOUS EVERY 30 MIN PRN
Status: DISCONTINUED | OUTPATIENT
Start: 2020-01-01 | End: 2020-01-01

## 2020-01-01 RX ORDER — POLYETHYLENE GLYCOL 3350 17 G/17G
17 POWDER, FOR SOLUTION ORAL DAILY PRN
Status: DISCONTINUED | OUTPATIENT
Start: 2020-01-01 | End: 2020-01-01 | Stop reason: HOSPADM

## 2020-01-01 RX ORDER — POTASSIUM CL/LIDO/0.9 % NACL 10MEQ/0.1L
10 INTRAVENOUS SOLUTION, PIGGYBACK (ML) INTRAVENOUS
Status: DISCONTINUED | OUTPATIENT
Start: 2020-01-01 | End: 2020-01-01

## 2020-01-01 RX ORDER — MIRTAZAPINE 15 MG/1
15 TABLET, ORALLY DISINTEGRATING ORAL AT BEDTIME
Qty: 30 TABLET | Refills: 3 | Status: SHIPPED | OUTPATIENT
Start: 2020-01-01 | End: 2020-01-01

## 2020-01-01 RX ORDER — ACETAMINOPHEN 500 MG
1000 TABLET ORAL 3 TIMES DAILY
Qty: 100 TABLET | Refills: 3 | Status: ON HOLD | OUTPATIENT
Start: 2020-01-01 | End: 2020-01-01

## 2020-01-01 RX ORDER — LEVOFLOXACIN 500 MG/1
500 TABLET, FILM COATED ORAL EVERY OTHER DAY
Status: DISCONTINUED | OUTPATIENT
Start: 2020-01-01 | End: 2020-01-01

## 2020-01-01 RX ORDER — ACETAMINOPHEN 325 MG/10.15ML
650 LIQUID ORAL EVERY 4 HOURS PRN
DISCHARGE
Start: 2020-01-01 | End: 2020-01-01

## 2020-01-01 RX ORDER — LORATADINE 10 MG/1
10 TABLET ORAL DAILY
Status: DISCONTINUED | OUTPATIENT
Start: 2020-01-01 | End: 2020-01-01 | Stop reason: HOSPADM

## 2020-01-01 RX ORDER — LOPERAMIDE HCL 1 MG/7.5ML
2 SUSPENSION ORAL 4 TIMES DAILY PRN
Status: DISCONTINUED | OUTPATIENT
Start: 2020-01-01 | End: 2020-01-01 | Stop reason: HOSPADM

## 2020-01-01 RX ORDER — POTASSIUM CHLORIDE 750 MG/1
20-40 TABLET, EXTENDED RELEASE ORAL
Status: DISCONTINUED | OUTPATIENT
Start: 2020-01-01 | End: 2020-01-01

## 2020-01-01 RX ADMIN — THIAMINE HYDROCHLORIDE 500 MG: 100 INJECTION, SOLUTION INTRAMUSCULAR; INTRAVENOUS at 13:34

## 2020-01-01 RX ADMIN — B-COMPLEX W/ C & FOLIC ACID TAB 1 TABLET: TAB at 10:37

## 2020-01-01 RX ADMIN — ALBUTEROL SULFATE 2.5 MG: 2.5 SOLUTION RESPIRATORY (INHALATION) at 19:56

## 2020-01-01 RX ADMIN — POLYETHYLENE GLYCOL 3350 17 G: 17 POWDER, FOR SOLUTION ORAL at 09:47

## 2020-01-01 RX ADMIN — DOXYCYCLINE 100 MG: 100 INJECTION, POWDER, LYOPHILIZED, FOR SOLUTION INTRAVENOUS at 03:58

## 2020-01-01 RX ADMIN — PANTOPRAZOLE SODIUM 40 MG: 40 TABLET, DELAYED RELEASE ORAL at 09:23

## 2020-01-01 RX ADMIN — ACETAMINOPHEN 1000 MG: 500 TABLET, FILM COATED ORAL at 09:47

## 2020-01-01 RX ADMIN — Medication 10 MEQ: at 13:55

## 2020-01-01 RX ADMIN — Medication 1 CAPSULE: at 10:49

## 2020-01-01 RX ADMIN — HYDROMORPHONE HYDROCHLORIDE 0.2 MG: 1 INJECTION, SOLUTION INTRAMUSCULAR; INTRAVENOUS; SUBCUTANEOUS at 15:50

## 2020-01-01 RX ADMIN — THERA TABS 1 TABLET: TAB at 13:34

## 2020-01-01 RX ADMIN — FENTANYL CITRATE 25 MCG: 0.05 INJECTION, SOLUTION INTRAMUSCULAR; INTRAVENOUS at 19:43

## 2020-01-01 RX ADMIN — ACETAMINOPHEN 650 MG: 325 TABLET, FILM COATED ORAL at 08:51

## 2020-01-01 RX ADMIN — FAMOTIDINE 20 MG: 20 TABLET ORAL at 09:47

## 2020-01-01 RX ADMIN — CALCIUM CITRATE 200 MG (950 MG) TABLET 950 MG: at 13:34

## 2020-01-01 RX ADMIN — THERA TABS 1 TABLET: TAB at 15:37

## 2020-01-01 RX ADMIN — CYANOCOBALAMIN TAB 500 MCG 500 MCG: 500 TAB at 10:20

## 2020-01-01 RX ADMIN — POLYETHYLENE GLYCOL 3350 17 G: 17 POWDER, FOR SOLUTION ORAL at 00:12

## 2020-01-01 RX ADMIN — SODIUM CHLORIDE 1000 ML: 9 INJECTION, SOLUTION INTRAVENOUS at 16:55

## 2020-01-01 RX ADMIN — CALCIUM CITRATE 200 MG (950 MG) TABLET 950 MG: at 15:37

## 2020-01-01 RX ADMIN — DOXYCYCLINE 100 MG: 100 INJECTION, POWDER, LYOPHILIZED, FOR SOLUTION INTRAVENOUS at 16:13

## 2020-01-01 RX ADMIN — DICLOFENAC SODIUM 2 G: 10 GEL TOPICAL at 19:35

## 2020-01-01 RX ADMIN — FENTANYL CITRATE 50 MCG: 0.05 INJECTION, SOLUTION INTRAMUSCULAR; INTRAVENOUS at 20:48

## 2020-01-01 RX ADMIN — CYANOCOBALAMIN TAB 500 MCG 500 MCG: 500 TAB at 10:49

## 2020-01-01 RX ADMIN — SODIUM CHLORIDE, POTASSIUM CHLORIDE, SODIUM LACTATE AND CALCIUM CHLORIDE 500 ML: 600; 310; 30; 20 INJECTION, SOLUTION INTRAVENOUS at 16:41

## 2020-01-01 RX ADMIN — FAMOTIDINE 20 MG: 20 TABLET ORAL at 08:03

## 2020-01-01 RX ADMIN — HYDROMORPHONE HYDROCHLORIDE 2 MG: 2 TABLET ORAL at 17:46

## 2020-01-01 RX ADMIN — DOXYCYCLINE 100 MG: 100 CAPSULE ORAL at 08:17

## 2020-01-01 RX ADMIN — Medication 7.5 MG: at 21:29

## 2020-01-01 RX ADMIN — ACETAMINOPHEN 1000 MG: 500 TABLET, FILM COATED ORAL at 13:14

## 2020-01-01 RX ADMIN — LORATADINE 10 MG: 10 TABLET ORAL at 19:35

## 2020-01-01 RX ADMIN — ZINC SULFATE 220 MG (50 MG) CAPSULE 220 MG: CAPSULE at 13:34

## 2020-01-01 RX ADMIN — CHOLECALCIFEROL CAP 1.25 MG (50000 UNIT) 1250 MCG: 1.25 CAP at 15:36

## 2020-01-01 RX ADMIN — SERTRALINE HYDROCHLORIDE 50 MG: 50 TABLET ORAL at 08:17

## 2020-01-01 RX ADMIN — CALCIUM CITRATE 200 MG (950 MG) TABLET 950 MG: at 14:20

## 2020-01-01 RX ADMIN — POLYPROPYLENE GLYCOL 400, PROPYLENE GLYCOL 1 DROP: .4; .3 LIQUID OPHTHALMIC at 19:58

## 2020-01-01 RX ADMIN — POLYPROPYLENE GLYCOL 400, PROPYLENE GLYCOL 1 DROP: .4; .3 LIQUID OPHTHALMIC at 21:30

## 2020-01-01 RX ADMIN — FAMOTIDINE 20 MG: 20 TABLET ORAL at 08:17

## 2020-01-01 RX ADMIN — POTASSIUM CHLORIDE, DEXTROSE MONOHYDRATE AND SODIUM CHLORIDE: 75; 5; 450 INJECTION, SOLUTION INTRAVENOUS at 03:53

## 2020-01-01 RX ADMIN — THERA TABS 1 TABLET: TAB at 13:14

## 2020-01-01 RX ADMIN — CYANOCOBALAMIN TAB 500 MCG 500 MCG: 500 TAB at 09:47

## 2020-01-01 RX ADMIN — SODIUM CHLORIDE, POTASSIUM CHLORIDE, SODIUM LACTATE AND CALCIUM CHLORIDE: 600; 310; 30; 20 INJECTION, SOLUTION INTRAVENOUS at 10:44

## 2020-01-01 RX ADMIN — MIRTAZAPINE 15 MG: 15 TABLET, ORALLY DISINTEGRATING ORAL at 22:00

## 2020-01-01 RX ADMIN — HYDROMORPHONE HYDROCHLORIDE 0.2 MG: 1 INJECTION, SOLUTION INTRAMUSCULAR; INTRAVENOUS; SUBCUTANEOUS at 07:40

## 2020-01-01 RX ADMIN — ALUMINUM HYDROXIDE, MAGNESIUM HYDROXIDE, AND DIMETHICONE 30 ML: 400; 400; 40 SUSPENSION ORAL at 18:27

## 2020-01-01 RX ADMIN — CALCIUM CITRATE 200 MG (950 MG) TABLET 950 MG: at 19:58

## 2020-01-01 RX ADMIN — SODIUM CHLORIDE: 9 INJECTION, SOLUTION INTRAVENOUS at 06:59

## 2020-01-01 RX ADMIN — ACETAMINOPHEN 1000 MG: 500 TABLET, FILM COATED ORAL at 08:17

## 2020-01-01 RX ADMIN — Medication 10 MEQ: at 15:35

## 2020-01-01 RX ADMIN — Medication 1 CAPSULE: at 10:37

## 2020-01-01 RX ADMIN — ACETAMINOPHEN 1000 MG: 500 TABLET, FILM COATED ORAL at 19:35

## 2020-01-01 RX ADMIN — PANTOPRAZOLE SODIUM 40 MG: 20 TABLET, DELAYED RELEASE ORAL at 08:03

## 2020-01-01 RX ADMIN — POLYETHYLENE GLYCOL 3350 17 G: 17 POWDER, FOR SOLUTION ORAL at 10:48

## 2020-01-01 RX ADMIN — DOXYCYCLINE 100 MG: 100 INJECTION, POWDER, LYOPHILIZED, FOR SOLUTION INTRAVENOUS at 18:27

## 2020-01-01 RX ADMIN — ACETAMINOPHEN 1000 MG: 500 TABLET, FILM COATED ORAL at 19:22

## 2020-01-01 RX ADMIN — CALCIUM CITRATE 200 MG (950 MG) TABLET 950 MG: at 19:35

## 2020-01-01 RX ADMIN — POTASSIUM CHLORIDE, DEXTROSE MONOHYDRATE AND SODIUM CHLORIDE: 75; 5; 450 INJECTION, SOLUTION INTRAVENOUS at 19:00

## 2020-01-01 RX ADMIN — CALCIUM GLUCONATE 1 G: 98 INJECTION, SOLUTION INTRAVENOUS at 10:41

## 2020-01-01 RX ADMIN — SODIUM CHLORIDE, POTASSIUM CHLORIDE, SODIUM LACTATE AND CALCIUM CHLORIDE 500 ML: 600; 310; 30; 20 INJECTION, SOLUTION INTRAVENOUS at 20:03

## 2020-01-01 RX ADMIN — PANTOPRAZOLE SODIUM 40 MG: 20 TABLET, DELAYED RELEASE ORAL at 01:17

## 2020-01-01 RX ADMIN — MAGNESIUM SULFATE IN WATER 4 G: 40 INJECTION, SOLUTION INTRAVENOUS at 00:12

## 2020-01-01 RX ADMIN — THIAMINE HYDROCHLORIDE 250 MG: 100 INJECTION, SOLUTION INTRAMUSCULAR; INTRAVENOUS at 09:45

## 2020-01-01 RX ADMIN — DOXYCYCLINE 100 MG: 100 CAPSULE ORAL at 19:35

## 2020-01-01 RX ADMIN — LORATADINE 10 MG: 10 TABLET ORAL at 08:03

## 2020-01-01 RX ADMIN — SODIUM CHLORIDE 500 ML: 9 INJECTION, SOLUTION INTRAVENOUS at 16:49

## 2020-01-01 RX ADMIN — THERA TABS 1 TABLET: TAB at 14:16

## 2020-01-01 RX ADMIN — PANTOPRAZOLE SODIUM 40 MG: 40 INJECTION, POWDER, FOR SOLUTION INTRAVENOUS at 01:38

## 2020-01-01 RX ADMIN — ZINC SULFATE 220 MG (50 MG) CAPSULE 220 MG: CAPSULE at 15:37

## 2020-01-01 RX ADMIN — Medication 10 MEQ: at 17:41

## 2020-01-01 RX ADMIN — POLYPROPYLENE GLYCOL 400, PROPYLENE GLYCOL 1 DROP: .4; .3 LIQUID OPHTHALMIC at 00:12

## 2020-01-01 RX ADMIN — ACETAMINOPHEN 1000 MG: 500 TABLET, FILM COATED ORAL at 10:48

## 2020-01-01 RX ADMIN — Medication 1 CAPSULE: at 09:22

## 2020-01-01 RX ADMIN — PANTOPRAZOLE SODIUM 40 MG: 40 TABLET, DELAYED RELEASE ORAL at 08:17

## 2020-01-01 RX ADMIN — LORATADINE 10 MG: 10 TABLET ORAL at 21:29

## 2020-01-01 RX ADMIN — HYDROMORPHONE HYDROCHLORIDE 0.5 MG: 1 INJECTION, SOLUTION INTRAMUSCULAR; INTRAVENOUS; SUBCUTANEOUS at 00:27

## 2020-01-01 RX ADMIN — THIAMINE HYDROCHLORIDE 500 MG: 100 INJECTION, SOLUTION INTRAMUSCULAR; INTRAVENOUS at 14:16

## 2020-01-01 RX ADMIN — PANTOPRAZOLE SODIUM 40 MG: 40 INJECTION, POWDER, FOR SOLUTION INTRAVENOUS at 08:29

## 2020-01-01 RX ADMIN — ACETAMINOPHEN 650 MG: 325 TABLET, FILM COATED ORAL at 15:51

## 2020-01-01 RX ADMIN — SERTRALINE HYDROCHLORIDE 25 MG: 25 TABLET, FILM COATED ORAL at 10:20

## 2020-01-01 RX ADMIN — PANTOPRAZOLE SODIUM 40 MG: 40 INJECTION, POWDER, FOR SOLUTION INTRAVENOUS at 20:12

## 2020-01-01 RX ADMIN — SERTRALINE HYDROCHLORIDE 50 MG: 50 TABLET ORAL at 08:04

## 2020-01-01 RX ADMIN — Medication 1 CAPSULE: at 09:47

## 2020-01-01 RX ADMIN — ACETAMINOPHEN 1000 MG: 500 TABLET, FILM COATED ORAL at 21:24

## 2020-01-01 RX ADMIN — SODIUM CHLORIDE: 9 INJECTION, SOLUTION INTRAVENOUS at 11:26

## 2020-01-01 RX ADMIN — SERTRALINE HYDROCHLORIDE 25 MG: 25 TABLET, FILM COATED ORAL at 10:49

## 2020-01-01 RX ADMIN — ACETAMINOPHEN 1000 MG: 500 TABLET, FILM COATED ORAL at 08:03

## 2020-01-01 RX ADMIN — LOPERAMIDE HCL 2 MG: 1 SOLUTION ORAL at 13:59

## 2020-01-01 RX ADMIN — FUROSEMIDE 20 MG: 10 INJECTION, SOLUTION INTRAVENOUS at 07:39

## 2020-01-01 RX ADMIN — FAMOTIDINE 20 MG: 20 TABLET ORAL at 09:22

## 2020-01-01 RX ADMIN — CALCIUM CITRATE 200 MG (950 MG) TABLET 950 MG: at 21:30

## 2020-01-01 RX ADMIN — POLYPROPYLENE GLYCOL 400, PROPYLENE GLYCOL 1 DROP: .4; .3 LIQUID OPHTHALMIC at 16:35

## 2020-01-01 RX ADMIN — Medication 7.5 MG: at 21:24

## 2020-01-01 RX ADMIN — ZINC SULFATE 220 MG (50 MG) CAPSULE 220 MG: CAPSULE at 13:14

## 2020-01-01 RX ADMIN — SODIUM CHLORIDE, POTASSIUM CHLORIDE, SODIUM LACTATE AND CALCIUM CHLORIDE: 600; 310; 30; 20 INJECTION, SOLUTION INTRAVENOUS at 01:17

## 2020-01-01 RX ADMIN — POLYPROPYLENE GLYCOL 400, PROPYLENE GLYCOL 1 DROP: .4; .3 LIQUID OPHTHALMIC at 19:23

## 2020-01-01 RX ADMIN — CALCIUM GLUCONATE 1 G: 98 INJECTION, SOLUTION INTRAVENOUS at 17:12

## 2020-01-01 RX ADMIN — DICLOFENAC SODIUM 2 G: 10 GEL TOPICAL at 00:12

## 2020-01-01 RX ADMIN — THIAMINE HYDROCHLORIDE 500 MG: 100 INJECTION, SOLUTION INTRAMUSCULAR; INTRAVENOUS at 21:24

## 2020-01-01 RX ADMIN — FLUCONAZOLE 200 MG: 200 TABLET ORAL at 16:37

## 2020-01-01 RX ADMIN — MAGNESIUM SULFATE IN WATER 4 G: 40 INJECTION, SOLUTION INTRAVENOUS at 11:42

## 2020-01-01 RX ADMIN — POLYETHYLENE GLYCOL 3350 17 G: 17 POWDER, FOR SOLUTION ORAL at 10:18

## 2020-01-01 RX ADMIN — POTASSIUM CHLORIDE, DEXTROSE MONOHYDRATE AND SODIUM CHLORIDE: 75; 5; 450 INJECTION, SOLUTION INTRAVENOUS at 00:29

## 2020-01-01 RX ADMIN — ONDANSETRON 4 MG: 2 INJECTION INTRAMUSCULAR; INTRAVENOUS at 10:23

## 2020-01-01 RX ADMIN — DOXYCYCLINE 100 MG: 100 CAPSULE ORAL at 09:22

## 2020-01-01 RX ADMIN — LORATADINE 10 MG: 10 TABLET ORAL at 19:23

## 2020-01-01 RX ADMIN — LEVOFLOXACIN 500 MG: 500 TABLET, FILM COATED ORAL at 10:50

## 2020-01-01 RX ADMIN — Medication 1 CAPSULE: at 15:37

## 2020-01-01 RX ADMIN — CALCIUM CITRATE 200 MG (950 MG) TABLET 950 MG: at 19:22

## 2020-01-01 RX ADMIN — POLYPROPYLENE GLYCOL 400, PROPYLENE GLYCOL 1 DROP: .4; .3 LIQUID OPHTHALMIC at 19:35

## 2020-01-01 RX ADMIN — DOXYCYCLINE 100 MG: 100 CAPSULE ORAL at 21:30

## 2020-01-01 RX ADMIN — POLYPROPYLENE GLYCOL 400, PROPYLENE GLYCOL 1 DROP: .4; .3 LIQUID OPHTHALMIC at 16:28

## 2020-01-01 RX ADMIN — ACETAMINOPHEN 1000 MG: 500 TABLET, FILM COATED ORAL at 21:30

## 2020-01-01 RX ADMIN — SERTRALINE HYDROCHLORIDE 25 MG: 25 TABLET, FILM COATED ORAL at 09:23

## 2020-01-01 RX ADMIN — B-COMPLEX W/ C & FOLIC ACID TAB 1 TABLET: TAB at 09:47

## 2020-01-01 RX ADMIN — LEVOFLOXACIN 500 MG: 5 INJECTION, SOLUTION INTRAVENOUS at 11:18

## 2020-01-01 RX ADMIN — LEVOFLOXACIN 500 MG: 500 TABLET, FILM COATED ORAL at 08:17

## 2020-01-01 RX ADMIN — ACETAMINOPHEN 1000 MG: 500 TABLET, FILM COATED ORAL at 09:20

## 2020-01-01 RX ADMIN — ACETAMINOPHEN 1000 MG: 500 TABLET, FILM COATED ORAL at 14:16

## 2020-01-01 RX ADMIN — B-COMPLEX W/ C & FOLIC ACID TAB 1 TABLET: TAB at 10:49

## 2020-01-01 RX ADMIN — FAMOTIDINE 20 MG: 20 TABLET ORAL at 10:49

## 2020-01-01 RX ADMIN — ACETAMINOPHEN 1000 MG: 500 TABLET, FILM COATED ORAL at 19:58

## 2020-01-01 RX ADMIN — HYDROMORPHONE HYDROCHLORIDE 2 MG: 2 TABLET ORAL at 20:11

## 2020-01-01 RX ADMIN — CYANOCOBALAMIN TAB 500 MCG 500 MCG: 500 TAB at 09:21

## 2020-01-01 RX ADMIN — CALCIUM CITRATE 200 MG (950 MG) TABLET 950 MG: at 13:14

## 2020-01-01 RX ADMIN — DICLOFENAC SODIUM 2 G: 10 GEL TOPICAL at 21:29

## 2020-01-01 RX ADMIN — PANTOPRAZOLE SODIUM 40 MG: 40 TABLET, DELAYED RELEASE ORAL at 10:50

## 2020-01-01 RX ADMIN — POTASSIUM PHOSPHATE, MONOBASIC AND POTASSIUM PHOSPHATE, DIBASIC 20 MMOL: 224; 236 INJECTION, SOLUTION INTRAVENOUS at 16:19

## 2020-01-01 RX ADMIN — LORATADINE 10 MG: 10 TABLET ORAL at 19:58

## 2020-01-01 RX ADMIN — POLYPROPYLENE GLYCOL 400, PROPYLENE GLYCOL 1 DROP: .4; .3 LIQUID OPHTHALMIC at 10:48

## 2020-01-01 RX ADMIN — Medication 7.5 MG: at 21:12

## 2020-01-01 RX ADMIN — THIAMINE HYDROCHLORIDE 500 MG: 100 INJECTION, SOLUTION INTRAMUSCULAR; INTRAVENOUS at 20:52

## 2020-01-01 RX ADMIN — THIAMINE HYDROCHLORIDE 500 MG: 100 INJECTION, SOLUTION INTRAMUSCULAR; INTRAVENOUS at 10:13

## 2020-01-01 RX ADMIN — THIAMINE HCL TAB 100 MG 100 MG: 100 TAB at 11:16

## 2020-01-01 RX ADMIN — POLYPROPYLENE GLYCOL 400, PROPYLENE GLYCOL 1 DROP: .4; .3 LIQUID OPHTHALMIC at 13:34

## 2020-01-01 RX ADMIN — FUROSEMIDE 40 MG: 10 INJECTION, SOLUTION INTRAMUSCULAR; INTRAVENOUS at 00:36

## 2020-01-01 RX ADMIN — PANTOPRAZOLE SODIUM 40 MG: 40 TABLET, DELAYED RELEASE ORAL at 10:35

## 2020-01-01 RX ADMIN — POTASSIUM CHLORIDE, DEXTROSE MONOHYDRATE AND SODIUM CHLORIDE: 75; 5; 450 INJECTION, SOLUTION INTRAVENOUS at 15:35

## 2020-01-01 RX ADMIN — B-COMPLEX W/ C & FOLIC ACID TAB 1 TABLET: TAB at 09:22

## 2020-01-01 RX ADMIN — THIAMINE HYDROCHLORIDE 500 MG: 100 INJECTION, SOLUTION INTRAMUSCULAR; INTRAVENOUS at 09:45

## 2020-01-01 RX ADMIN — SODIUM CHLORIDE 0.1 MG/KG/HR: 9 INJECTION, SOLUTION INTRAVENOUS at 17:42

## 2020-01-01 RX ADMIN — SODIUM CHLORIDE 500 ML: 9 INJECTION, SOLUTION INTRAVENOUS at 11:01

## 2020-01-01 RX ADMIN — PANTOPRAZOLE SODIUM 40 MG: 40 TABLET, DELAYED RELEASE ORAL at 09:47

## 2020-01-01 RX ADMIN — B-COMPLEX W/ C & FOLIC ACID TAB 1 TABLET: TAB at 15:37

## 2020-01-01 RX ADMIN — ALBUTEROL SULFATE 2.5 MG: 2.5 SOLUTION RESPIRATORY (INHALATION) at 16:06

## 2020-01-01 RX ADMIN — DOXYCYCLINE 100 MG: 100 CAPSULE ORAL at 10:48

## 2020-01-01 RX ADMIN — CYANOCOBALAMIN TAB 500 MCG 500 MCG: 500 TAB at 15:37

## 2020-01-01 RX ADMIN — DICLOFENAC SODIUM 2 G: 10 GEL TOPICAL at 19:23

## 2020-01-01 RX ADMIN — DOXYCYCLINE 100 MG: 100 INJECTION, POWDER, LYOPHILIZED, FOR SOLUTION INTRAVENOUS at 22:19

## 2020-01-01 RX ADMIN — FAMOTIDINE 20 MG: 20 TABLET ORAL at 10:19

## 2020-01-01 RX ADMIN — PANTOPRAZOLE SODIUM 40 MG: 40 INJECTION, POWDER, FOR SOLUTION INTRAVENOUS at 17:35

## 2020-01-01 RX ADMIN — DOXYCYCLINE HYCLATE 100 MG: 100 CAPSULE ORAL at 08:03

## 2020-01-01 RX ADMIN — ZINC SULFATE 220 MG (50 MG) CAPSULE 220 MG: CAPSULE at 14:16

## 2020-01-01 ASSESSMENT — ACTIVITIES OF DAILY LIVING (ADL)
ADLS_ACUITY_SCORE: 26
ADLS_ACUITY_SCORE: 25
ADLS_ACUITY_SCORE: 21
ADLS_ACUITY_SCORE: 25
ADLS_ACUITY_SCORE: 24
ADLS_ACUITY_SCORE: 23
ADLS_ACUITY_SCORE: 24
ADLS_ACUITY_SCORE: 24
AMBULATION: 1-->ASSISTIVE EQUIPMENT
ADLS_ACUITY_SCORE: 25
NUMBER_OF_TIMES_PATIENT_HAS_FALLEN_WITHIN_LAST_SIX_MONTHS: 10
RETIRED_COMMUNICATION: 0-->UNDERSTANDS/COMMUNICATES WITHOUT DIFFICULTY
ADLS_ACUITY_SCORE: 25
TOILETING: 1-->ASSISTIVE EQUIPMENT
SWALLOWING: 0-->SWALLOWS FOODS/LIQUIDS WITHOUT DIFFICULTY
FALL_HISTORY_WITHIN_LAST_SIX_MONTHS: YES
ADLS_ACUITY_SCORE: 25
COGNITION: 0 - NO COGNITION ISSUES REPORTED
ADLS_ACUITY_SCORE: 25
ADLS_ACUITY_SCORE: 25
ADLS_ACUITY_SCORE: 24
DRESS: 2-->ASSISTIVE PERSON
ADLS_ACUITY_SCORE: 23
ADLS_ACUITY_SCORE: 25
TRANSFERRING: 1-->ASSISTIVE EQUIPMENT
ADLS_ACUITY_SCORE: 25
ADLS_ACUITY_SCORE: 26
ADLS_ACUITY_SCORE: 26
ADLS_ACUITY_SCORE: 21
ADLS_ACUITY_SCORE: 24
PRIOR_ADL/IADL_STATUS: IMPAIRED PRIOR TO ONSET
BATHING: 1-->ASSISTIVE EQUIPMENT
ADLS_ACUITY_SCORE: 25
ADLS_ACUITY_SCORE: 25
RETIRED_EATING: 0-->INDEPENDENT
WHICH_OF_THE_ABOVE_FUNCTIONAL_RISKS_HAD_A_RECENT_ONSET_OR_CHANGE?: FALL HISTORY
ADLS_ACUITY_SCORE: 25
ADLS_ACUITY_SCORE: 24
ADLS_ACUITY_SCORE: 26
ADLS_ACUITY_SCORE: 25
ADLS_ACUITY_SCORE: 26

## 2020-01-01 ASSESSMENT — ENCOUNTER SYMPTOMS
BLOOD IN STOOL: 0
VOMITING: 0
EYE REDNESS: 0
WEAKNESS: 1
HEMATURIA: 0
DIARRHEA: 0
COUGH: 1
SHORTNESS OF BREATH: 1
FEVER: 0
SHORTNESS OF BREATH: 0
SHORTNESS OF BREATH: 0
UNEXPECTED WEIGHT CHANGE: 0
ARTHRALGIAS: 0
ARTHRALGIAS: 0
HEADACHES: 0
DIARRHEA: 1
CONSTIPATION: 1
COLOR CHANGE: 0
FEVER: 0
ABDOMINAL PAIN: 0
LIGHT-HEADEDNESS: 1
CONFUSION: 0
WHEEZING: 0
NECK STIFFNESS: 0
NAUSEA: 0
DIZZINESS: 1
CONSTIPATION: 0
ABDOMINAL PAIN: 1
COUGH: 0
HEADACHES: 0
VOMITING: 1
BLOOD IN STOOL: 0
FEVER: 0
ABDOMINAL PAIN: 1
DIFFICULTY URINATING: 0
DYSURIA: 0
FATIGUE: 1
DIARRHEA: 1
MYALGIAS: 0

## 2020-01-01 ASSESSMENT — PATIENT HEALTH QUESTIONNAIRE - PHQ9: SUM OF ALL RESPONSES TO PHQ QUESTIONS 1-9: 0

## 2020-01-01 ASSESSMENT — VISUAL ACUITY
OS: 20/30
OD: 20/40

## 2020-01-01 ASSESSMENT — PAIN SCALES - GENERAL: PAINLEVEL: EXTREME PAIN (9)

## 2020-01-06 NOTE — DISCHARGE INSTRUCTIONS
Visual Rehabilitation Summary   01/06/2020  Visual Findings:  1. Your visual acuity, measuring how small of letters you can see at 20 feet on the eye chart, is 20/40 in the right eye and 20/30 in the left eye.   That is a little below normal vision of 20/20, but close!  2. You saw the chart better with a full spectrum gooseneck task light.  I circled an [employer] Wing Shade Lamp on page 28H that would work well for you.  3. Your glasses require you to be close to the reading material to be in focus.  If reading material is on the table, this requires you to lean forward to bring it into focus, which is hard on your back.  Placing your Bible or other books on a  lap desk  will raise it up to the right height, and allow for better posture when reading.   This is on page 44H in the catalog.   4. If print is too small, a  dome  magnifier is easy to use to make it look bigger. I circled one on page 24H in the catalog.  5. You qualify for a free audio book player and audio Bible in Tajik. If you would like me to sign you up for that in the future, you can call me.  Lavinia Murphy, OTR/L  [facility]  [phone]

## 2020-01-08 NOTE — PROGRESS NOTES
01/06/20 1300   Signing Clinician's Name / Credentials   Signing clinician's name / credentials Lavinia Murphy, OTR/L   Session Number   Session Number 1   Reporting period 01/06-01/06 - D/C this date   Authorization status authorized - [employer]   GOALS   Goals Near Vision   Goals Addressed this Session Near vision   Goal 1 - Near Vision   Goal Description: Near Vision   (identify 3-4 strategies for inc. ind. in reading)   Near Vision Goal Comment goal met. Identified benefits of full spectrum task light, use of lap desk to raise reading material to proper focal distance for reading, dome magnifier for reading small print, and audio books as resource   Target Date 01/06/20   Date Met 01/06/20       Present Yes   Language Other  (Nepali)    Comment Shakira Ly   Therapy Diagnosis   Therapy  Diagnosis: Impaired ADL/IADL with deficits in Reading based ADL   Visual Rehab Treatment Winn   Daily Treatment Winn Self Care/Home Management   Self Care/Home Management   Self-Care/Home Mgmt/ADL, Compensatory, Meal Prep Minutes (38262) 30 Minutes   Skilled Intervention Training in use of magnifiers for reading;Training in use of electronic aids for reading;Instruction in task lighting placement/directionality, assessment of lighting for optimal  clarity/minimal glare   Patient Response pt with fair understanding. Issued written summary of clinical findings and recommendations for patient to share with her adult daughters for reinforcment   Treatment Detail Reading: trialed task light. Pt identified increased reading ability utilizing full spectrum task light, and resources were issued. Magnifiaiton: target print sizes were read with 4X dome magnifier. Reading material was propped on lap desk to position reading material at proper focal distance for +3.0 glasses. Pt reports she reads heavy, large print Bible, which would be more easily read supported and raised on lap desk..Audio books:  demosntrated [employer] audio book player and provided pt ed. on resources for Bible in Danish in audio book format. Pt declined at this time.   MN Read   Smallest print size read 0.5M indicated on MNRead chart with dome magnifier   Critical print size 0.6M  indicated on MNRead chart with dome magnifier   Words per minute at critical print size reading not timed: Danish is primary language   Equipment/Resources   Written resources provided Low vision devices  (written summary of clinical findings and recommendations)   Education   Learner Patient   Readiness Acceptance   Method Booklet/handout;Explanation;Demonstration   Response Needs reinforcement   Communication with other professionals   Communication with other professionals per EHR   Plan   Plan for next session Discharge: pt verbalized desire to complete POC this date   Total Session Time   Timed Code Treatment Minutes 30   Total Treatment Time (sum of timed and untimed services) 75

## 2020-01-08 NOTE — PROGRESS NOTES
01/06/20 1300   Visit Type   Type of Visit Initial       Present Yes   Language Other  (Greek)    Comment Shakira Ly   General Information   Start Of Care Date 01/06/20   Referring Physician Jan Allen   Orders Evaluate And Treat As Indicated   Date of Order 12/11/20   Medical Diagnosis AMD, dry eye, ulcertive blepharitis   Onset Of Illness/injury Or Date Of Surgery rheumatism, arthritis   Surgical/Medical history reviewed Yes  (HBP)   Precautions/Limitations walks with wheeled walker. Reports recent move to assitive living secondary to decreased ind. in self care   Prior ADL/IADL status Impaired prior to onset   Others present at visit   ()   Patient/family Goals Statement reading, Reads Belarusian books, large print Bible   Social History/Home Environment   Living Environment Assisted living  (40 apartments, assist with laundry, meals, cleaning, bathing)   Current Community Support   (assist with bathing, medication managment, makes tea ind.)   Avocational reading. Reports is not interested in other activities. Reads own large print Bible   Pain Assessment   Pain Reported Yes   Pain Location Back   Fall Risk Screen   Fall screen completed by OT   Have you fallen 2 or more times in the past year? Yes   Have you fallen and had an injury in the past year? No   Is patient a fall risk? Yes   Fall screen comments doctor recommneded PT at last primary care visit - patient declined   Cognitive/Behavioral   Communication Intact   Cognitive Status   (reports short term loss )   Behavior Appropriate   Patient/family aware of diagnosis Yes   How well do you understand your eye condition? Not well   Vision related restrictions on visiting with family/friends None   Reported emotional impact of visual impairment None  (reports strong Judaism carina, does not get discouraged)   Adjustment to disability Good   Physical Status/Equipment   Physical Status Weakness;Impaired balance    Mobility equipment used Walker   ADL Equipment used   (lives in assisted living - grab bars etc. provided)   Visual Report   Functional Complaints Reading   Uriel Romano Symptoms? No   Magnifier (strength and type) none   Reading glasses Bifocal   Technology   (none)   Lighting and Glare   Is your lighting adequate? Yes/ at home   Is glare a problem? Yes/ outdoors   Are you satisfied with your sunglasses? Yes   Sunglass filter color Gray   Visual Acuity   Acuity right eye 20/40   Acuity left eye 20/30   Contrast Sensitivity   Contrast sensitivity (score/25) 25/25   MN Read   Smallest print size read 1.0M indicated on MNRead chart   Critical print size 1.3M indicated on MNRead chart   Words per minute at critical print size reading not timed: Irish is primary language   Education   Learner Patient   Readiness Acceptance   Method Booklet/handout;Explanation;Demonstration   Response Needs reinforcement   Clinical Impression, OT Eval   Criteria for Skilled Therapeutic Interventions Met yes;treatment indicated   Therapy  Diagnosis: Impaired ADL/IADL with deficits in Reading based ADL   Assessment of Occupational Performance 1-3 Performance Deficits   Identified Performance Deficits Reading limitations   Clinical Decision Making (Complexity) Low complexity   Clinical Impression Comments Pt verbalized reading is her only visual goal at this time   OT Visual Rehabilitation Evaluation Plan   Therapy Plan Occupational therapy intervention   Planned Interventions Low vision compensatory training for reading   Frequency / Duration 1X - pt reports she desires to completed plan of care this date   Risks and Benefits of Treatment have been explained. Yes   Patient, Family in agreement with plan of care Yes   GOALS   Goals Near Vision   Goals Addressed this Session Near vision   Goal 1 - Near Vision   Goal Description: Near Vision   (identify 3-4 strategies for inc. ind. in reading)   Target Date 01/06/20   Date Met 01/06/20    Total Evaluation Time   OT Eval, Low Complexity Minutes (42740) 45   Therapy Certification   Certification date from 01/06/20   Certification date to 01/06/20   Medical Diagnosis Dry AMD, myopia, ulcerative blepharitis

## 2020-01-09 NOTE — PROGRESS NOTES
Chief Complaint   Patient presents with     Wound Check     2 week follow up.  Left medial 2nd digit.          No Known Allergies      Subjective: Jatin is a 89 year old female who presents to the clinic today for a follow up of left medial 2nd digit ulcer. The nurses at the AL are changing the dressing daily with Iodosorb and a bordered gauze. She does not have pain to the toe.     Objective        A pressure wound is noted at left  medial 2nd digit at the PIPJ measuring 0.3cm x 0.3cm x 0.2cm.     Owens Classification: 3     Wound base: Pink  granulation     Edges: inflamed     Drainage: slight/serous     Odor: no     Undermining: no     Bone Exposure: Yes: PIPJ     Clinical Signs of Infection: No     After obtaining patient consent, the wound was irrigated with copious amounts of saline. No sharp debridement performed today.         Assessment: Diana is a 89 YO woman with a pressure wound on the left medial 2nd toe. This does go to bone and she has some cortical irregularity, but I have less suspicion for osteomyelitis based on the appearance of the digit.      Plan:  - Pt seen and evaluated.  - XRs taken and discussed with her and her daughter.   - Needs pressure relief from the toe. She does not want an amputation.   - Iodosorb and bordered gauze on the toe. Change daily. Orders written for assisted living nurses.  - See again in 4 weeks.

## 2020-01-14 NOTE — NURSING NOTE
Reason For Visit:   Chief Complaint   Patient presents with     Wound Check     2 week follow up.  Left medial 2nd digit.       Pain Assessment  Patient Currently in Pain: Denies        No Known Allergies        Deloris Cagle LPN

## 2020-01-14 NOTE — LETTER
1/14/2020       RE: Jatin Ayala  [address]  [place] [facility] [place] [address]     Dear Colleague,    Thank you for referring your patient, Jatin Ayala, to the [facility] at [facility]. Please see a copy of my visit note below.    Chief Complaint   Patient presents with     Wound Check     2 week follow up.  Left medial 2nd digit.          No Known Allergies      Subjective: Jatin is a 89 year old female who presents to the clinic today for a follow up of left medial 2nd digit ulcer. The nurses at the AL are changing the dressing daily with Iodosorb and a bordered gauze. She does not have pain to the toe.     Objective        A pressure wound is noted at left  medial 2nd digit at the PIPJ measuring 0.3cm x 0.3cm x 0.2cm.     Owens Classification: 3     Wound base: Pink  granulation     Edges: inflamed     Drainage: slight/serous     Odor: no     Undermining: no     Bone Exposure: Yes: PIPJ     Clinical Signs of Infection: No     After obtaining patient consent, the wound was irrigated with copious amounts of saline. No sharp debridement performed today.         Assessment: Diana is a 89 YO woman with a pressure wound on the left medial 2nd toe. This does go to bone and she has some cortical irregularity, but I have less suspicion for osteomyelitis based on the appearance of the digit.      Plan:  - Pt seen and evaluated.  - XRs taken and discussed with her and her daughter.   - Needs pressure relief from the toe. She does not want an amputation.   - Iodosorb and bordered gauze on the toe. Change daily. Orders written for assisted living nurses.  - See again in 4 weeks.         Again, thank you for allowing me to participate in the care of your patient.      Sincerely,    Eloy Carrion DPM

## 2020-02-18 NOTE — PATIENT INSTRUCTIONS
You can shower normally.   Continue to cover the toe with a bordered gauze and lamb's wool daily. Stop the Iodosorb for now.

## 2020-02-18 NOTE — NURSING NOTE
Group Topic:  Behavioral Activation Group    Date: January 28  Start Time: 10:30 AM  End Time: 12:00 PM    Focus: Goals  Number in attendance: 5    RYAN Eduardo        Attendance: Present  Patient Response: Appropriate feedback, Attentive and Good eye contact     Pt’s goals are to work on snow removal, continue remodeling the bedroom and  a medication    Reason For Visit:   Chief Complaint   Patient presents with     Wound Check     left medial 2nd digit ulcer.                No Known Allergies        Deloris Cagle LPN

## 2020-02-18 NOTE — LETTER
2/18/2020       RE: Jatin Ayala  [address]  [facility] [address]     Dear Colleague,    Thank you for referring your patient, Jatin Ayala, to the [facility] at [facility]. Please see a copy of my visit note below.    Chief Complaint:   Chief Complaint   Patient presents with     Wound Check     left medial 2nd digit ulcer.        No Known Allergies      Subjective: Jatin is a 89 year old female who presents to the clinic today for a follow up of left medial 2nd digit ulcer. She relates that she is using the Iodosorb and the DSD. No pain to the toe. She presents with a  today.     Objective  Data Unavailable Data Unavailable Data Unavailable Data Unavailable Data Unavailable 0 lbs 0 oz  Previous ulceration of the medial left 2nd digit is healed over. Some hyperkeratosis noted to the area, however no open lesions. No pain with palpation of the digit.     Assessment: Healed left 2nd digit ulceration.     Plan:   - Pt seen and evaluated  - Cont covering the area with a bordered gauze and using the lamb's wool. Can discontinue the Iodosorb.   - Pt to return to clinic in 1 month.      Again, thank you for allowing me to participate in the care of your patient.      Sincerely,    Eloy Carrion DPM

## 2020-02-19 NOTE — PROGRESS NOTES
Chief Complaint:   Chief Complaint   Patient presents with     Wound Check     left medial 2nd digit ulcer.        No Known Allergies      Subjective: Jatin is a 89 year old female who presents to the clinic today for a follow up of left medial 2nd digit ulcer. She relates that she is using the Iodosorb and the DSD. No pain to the toe. She presents with a  today.     Objective  Data Unavailable Data Unavailable Data Unavailable Data Unavailable Data Unavailable 0 lbs 0 oz  Previous ulceration of the medial left 2nd digit is healed over. Some hyperkeratosis noted to the area, however no open lesions. No pain with palpation of the digit.     Assessment: Healed left 2nd digit ulceration.     Plan:   - Pt seen and evaluated  - Cont covering the area with a bordered gauze and using the lamb's wool. Can discontinue the Iodosorb.   - Pt to return to clinic in 1 month.

## 2020-03-02 NOTE — TELEPHONE ENCOUNTER
[facility] Call Center    Phone Message    May a detailed message be left on voicemail: yes     Reason for Call: Other: Pt nursing home would like a call back to discuss pt and options for care and symptoms are back     Action Taken: Message routed to:  [facility]    Travel Screening: Not Applicable

## 2020-03-02 NOTE — TELEPHONE ENCOUNTER
Called patients nursing home back and let them know since the symptoms are back she should come in to see Dr. Carrion. They were in agreement and were going to set up their own appointment due to needing an . If they call back to schedule please have them scheduled at 2:20 pm on Friday for a double booking

## 2020-03-06 NOTE — PROGRESS NOTES
"Diana is a 89 year old  who presents for   Patient presents with:  RECHECK: Orders came from nurse at facility, would like to recheck BP (numbers have been high recently), check edema in legs, discuss dizziness and to let you know about a toe problem but they have podietry scheduled soon  Medication Change: Famotidine, change from 40mg to 20, nurse says she is doing ok with 20mg    Assessment and Plan      1. Gastroesophageal reflux disease without esophagitis  Refill given  - famotidine (PEPCID) 20 MG tablet; Take 1 tablet (20 mg) by mouth daily  Dispense: 90 tablet; Refill: 3    2. Bilateral lower extremity edema  Patient presenting with increased lower extremity edema. Patient has a history of edema, however no significant change to explain current edema. Recommended taking Lasix, one tab every other day for 3 doses, and returning in 1 week for labs and possible re-dosing of lasix, vs switching diuretics entirely. Also provided compression wrap to help with swelling. Given edema, will order echo to ensure she does not have worsening heart failure to explain edema.   - furosemide (LASIX) 20 MG tablet; Take 1 tablet (20 mg) by mouth every other day for 3 doses  Dispense: 3 tablet; Refill: 0  - Echocardiogram Complete; Future  - C ACE BANDAGE 3-5\" WIDTH  - C ACE BANDAGE 3-5\" WIDTH     No follow-ups on file.    There are no discontinued medications.      Fernanda Light, DO         HPI       Diana is a 89 year old  who presents for   Patient presents with:  RECHECK: Orders came from nurse at facility, would like to recheck BP (numbers have been high recently), check edema in legs, discuss dizziness and to let you know about a toe problem but they have podietry scheduled soon  Medication Change: Famotidine, change from 40mg to 20, nurse says she is doing ok with 20mg    Visit [place]  1. Edema  - Bilateral lower extremities  - Painful  - No erythema, no fever, no drainage  - Both feet up to shin  - Has had before, but not " recently  - last echo reviewed, done 3/24/2006  - NH also reports increase in BP    A Icelandic  was used for  this visit.   Problem, Medication and Allergy Lists were reviewed and updated if needed..  Patient is an established patient of this clinic.  Reviewed and updated as needed this visit by clinical staff  Tobacco  Allergies  Meds  Med Hx  Surg Hx  Fam Hx  Soc Hx      Reviewed and updated as needed this visit by Provider              Review of Systems:   Review of Systems  5 point review of symptoms was otherwise negative except as noted in HPI         Physical Exam:     Vitals:    03/06/20 1403   BP: (!) 156/70   BP Location: Left arm   Patient Position: Sitting   Cuff Size: Adult Small   Pulse: 76   Resp: 16   Temp: 97.9  F (36.6  C)   TempSrc: Oral   SpO2: 100%   Weight: 45.2 kg (99 lb 9.6 oz)     Body mass index is 21.49 kg/m .  Vitals were reviewed and were normal except elevated BP     Physical Exam  Vitals signs reviewed.   Constitutional:       General: She is not in acute distress.     Appearance: She is not ill-appearing, toxic-appearing or diaphoretic.   Cardiovascular:      Rate and Rhythm: Normal rate and regular rhythm.   Pulmonary:      Effort: Pulmonary effort is normal.      Breath sounds: Normal breath sounds.   Musculoskeletal:      Right lower leg: Edema present.      Left lower leg: Edema (2+ pitting to mid shin bilaterally) present.   Neurological:      Mental Status: She is alert. Mental status is at baseline.   Psychiatric:         Mood and Affect: Mood normal.         Behavior: Behavior normal.         Thought Content: Thought content normal.         Judgment: Judgment normal.         Results:   Results are ordered and pending    Options for treatment and follow-up care were reviewed with the patient. Jatin Ayala  engaged in the decision making process and verbalized understanding of the options discussed and agreed with the final plan.  [facility]

## 2020-03-06 NOTE — PATIENT INSTRUCTIONS
1. Take new medication: lasix 20mg every other day for 3 doses total  2. Follow-up in 1 week for re-evaluation of swelling and check labs (kidney function, given lasix doses)  3. Echocardiogram ordered   4. Use ACE wraps every day and elevate legs when possible  5. Follow-up with podiatry as scheduled, ok to still shower but please continue lodosorb (brown paste) and wrap with lamb's wool as she has done recently - no antibiotics at this time

## 2020-03-17 NOTE — PROGRESS NOTES
Preceptor Attestation:   Patient seen, evaluated and discussed with the resident. I have verified the content of the note, which accurately reflects my assessment of the patient and the plan of care.   Supervising Physician:  Mario Alberto Ramsay MD

## 2020-03-20 NOTE — PROGRESS NOTES
Preceptor Attestation:   Patient discussed with the resident. Assessment and plan reviewed with resident and agreed upon.   Supervising Physician:  Marilou Rudolph MD  [facility]

## 2020-03-20 NOTE — PROGRESS NOTES
"Family Medicine Telephone Visit Note             Telephone Visit Consent   Patient was verbally read the following and verbal consent was obtained.  \"I understand that I may revoke this request for a phone visit at any time.  This consent will automatically  3 months from the signed date and time.\"    Name person giving consent:  Patient   Date verbal consent given:  3/20/2020  Time verbal consent given:  10:45 AM    Chief Complaint   Patient presents with     Leg Swelling       No Known Allergies             HPI   Patients name: Diana  Appointment start time: 10:42 AM    On line: RN, Patient, CC and .     Follow-up to check on leg swelling. At last visit was given 3 tabs of lasix, to take every other day. Also advised to wear compression stockings.     Has been using compression ace bandages. Toe injury is okay, able to tolerate stockings, Snipped off tip of stocking toes.   Lasix took 3 doses, Did okay, swelling improved, but since being off the meds has worsened again.   todays BP reading: today 144/71.  -170, DBP 70-80    Weight is unchanged. No confusion, no cramping, no side effects noted. No shortness of breath.      used via [facility] Line or similar service.          Assessment and Plan   1. Bilateral lower extremity edema  HTN  Patient seen today in follow-up for lower extremity edema. Tolerated lasix dose well, but swelling has returned since stopping the med (given 3 tabs for every other day dosing). BPs are being taken at home, and is ranging 140-170 systolic and 70-80 diastolic, has room for increased lasix dosing. Will resume every other day dosing and continue use of compression stockings. Will need to check BMP to see how Diana's kidneys are tolerating the lasix as well as check her electrolytes. As far as the origin of her edema, she has an upcoming echo to rule out CHF.  - Basic Metabolic Panel ([facility]); Future  - furosemide (LASIX) 20 MG tablet; Take 1 tablet (20 " mg) by mouth every other day for 7 days  Dispense: 4 tablet; Refill: 0  - follow-up: in 1 week with pcp or myself for in person assessment of her swelling and labs, if she is tolerating well she may benefit from long-term diuretics.   - RN present on line states that she will relay this message to Keyla, patient's daughter.     2. Gastroesophageal reflux disease without esophagitis  Well controlled.   - famotidine (PEPCID) 20 MG tablet; Take 1 tablet (20 mg) by mouth daily as needed (heartburn)  Dispense: 90 tablet; Refill: 3    Refilled medications that would be required in the next 3 months.     Appointment end time: 11:04 AM  This is a telephone visit that took 24 minutes.      Clinician location:  [facility]    JESSICA HENRY

## 2020-03-23 NOTE — PROGRESS NOTES
HPI       Jatin Ayala is a 89 year old  who presents for   Chief Complaint   Patient presents with     Swelling     swelling for multiple weeks. Swelling has gone down in the last week. Now getting red on the bottoms of her legs and her feet and it feels hot and feels like its burning.     Long standing lower extremity edema.   Previous primary had been attempting to manage swelling with hydrochlorothiazide. Was taking it three times a week. Worse after being up all day. Improved with elevation.   Recently things have worsened. Now the assisted living facility manages her medications. The swelling has worsened. Lasix helped, but ran out and swelling worsened again. Restarted 3/20 taking daily, tolerating well. No cramps, no pain. Swelling is much better. Denies shortness of breath. Has long standing dry cough.     Patient seen today in follow-up for lower extremity edema. Started lasix 20 mg every day 3/20/2020, tolerating well. No dizziness compression stockings are difficult to wear due toe pain  As far as the origin of her edema, she has an upcoming echo to rule out CHF.    2006 echo: Mild left ventricular diastolic dysfunction.       Daughter helping to interpret, declines .   +++++++      Problem, Medication and Allergy Lists were reviewed and updated if needed..    Patient is an established patient of this clinic..         Review of Systems:   Review of Systems  7 system ROS negative other than as noted in HPI         Physical Exam:     Vitals:    03/23/20 1628   BP: 132/78   Pulse: 87   Resp: 16   Temp: 97.9  F (36.6  C)   TempSrc: Oral   SpO2: 98%     There is no height or weight on file to calculate BMI.  Vitals were reviewed and were normal     Physical Exam  Vitals signs and nursing note reviewed.   Constitutional:       General: She is not in acute distress.     Appearance: She is well-developed.   HENT:      Head: Normocephalic and atraumatic.   Neck:      Musculoskeletal: Normal range  of motion.   Cardiovascular:      Rate and Rhythm: Normal rate and regular rhythm.      Pulses: Normal pulses.      Heart sounds: Normal heart sounds. No murmur.      Comments: Bilateral 1+ pitting edema. Pulses intact,   Pulmonary:      Effort: Pulmonary effort is normal. No respiratory distress.      Breath sounds: Normal breath sounds. No wheezing or rales.   Musculoskeletal: Normal range of motion.      Comments: Left great toe with bunyon.    Skin:     General: Skin is warm and dry.      Comments: skin warm and dry, no ulcerations.    Neurological:      Mental Status: She is alert and oriented to person, place, and time.   Psychiatric:         Mood and Affect: Mood normal.           Results:      Results from this visit  Results for orders placed or performed in visit on 03/23/20   Basic Metabolic Panel ([place])     Status: Abnormal   Result Value Ref Range    Calcium 8.5 8.5 - 10.1 mg/dL    Chloride 95.7 (L) 98.0 - 110.0 mmol/L    Carbon Dioxide 31.4 20.0 - 32.0 mmol/L    Creatinine 1.0 (H) 0.5 - 1.0 mg/dL    Glucose 163.6 (H) 70.0 - 99.0 mg'dL    Potassium 3.2 (L) 3.3 - 4.5 mmol/L    Sodium 132.9 132.6 - 141.4 mmol/L    GFR Estimate 53.8 (L) >60.0 mL/min/1.7 m2    GFR Estimate If Black 65.1 >60.0 mL/min/1.7 m2    Urea Nitrogen 20.4 (H) 7.0 - 19.0 mg/dL       Assessment and Plan        1. Bilateral lower extremity edema  2. Chronic diastolic heart failure (H)  3. Essential hypertension  4. Hypokalemia  Patient presenting for recheck of lower extremity edema, likely multifactorial, ruling out worsening heart failure with echo (ordered) previous echo with milk HFpEF. Symptomatically improving with daily 20 mg lasix, will continue at this dose, BP wnl. BMP today shows renal function unchanged, K slightly low. Advised she snip the tip of her compression stocking so that she can wear them without causing pain to her bunyon.  - can continue to symptomatically treat patient's swelling with further adjustments of  diuretics.   - furosemide (LASIX) 20 MG tablet; Take 1 tablet (20 mg) by mouth daily  Dispense: 30 tablet; Refill: 0  - Basic Metabolic Panel ([facility])  - Advised high potassium diet (banana/ avocado).              Medications Discontinued During This Encounter   Medication Reason     furosemide (LASIX) 20 MG tablet Reorder       Options for treatment and follow-up care were reviewed with the patient. Jatin Ayala  engaged in the decision making process and verbalized understanding of the options discussed and agreed with the final plan.    Verenice Hopson MD

## 2020-03-23 NOTE — TELEPHONE ENCOUNTER
I called the patient this morning with a Irish  and LVM letting her know that Dr. Carrino will conduct a phone visit with her tomorrow instead of her coming into the clinic due to the COVID-19 virus. I told her to call us back and confirm the change and phone number.    Amanda Chauhan, ATC

## 2020-03-23 NOTE — PATIENT INSTRUCTIONS
Here is the plan from today's visit    1. Bilateral lower extremity edema  No changes today  Continue to wear compression stockings daily (can snip the toes off for comfort).   Continue to take 1 tablet of lasix daily  Please weight daily   - furosemide (LASIX) 20 MG tablet; Take 1 tablet (20 mg) by mouth daily  Dispense: 30 tablet; Refill: 0      Please call or return to clinic if your symptoms don't go away.    Thank you for coming to [facility] today.  Lab Testing:  **If you had lab testing today and your results are reassuring or normal they will be mailed to you or sent through [employer] within 7 days.   **If the lab tests need quick action we will call you with the results.  The phone number we will call with results is # [phone] (home) none (work). If this is not the best number please call our clinic and change the number.  Medication Refills:  If you need any refills please call your pharmacy and they will contact us.   If you need to  your refill at a new pharmacy, please contact the new pharmacy directly. The new pharmacy will help you get your medications transferred faster.   Scheduling:  If you have any concerns about today's visit or wish to schedule another appointment please call our office during normal business hours [phone] (8-5:00 M-F)  If a referral was made to a [facility] and you don't get a call from central scheduling please call [phone].  If a Mammogram was ordered for you at [facility] call [phone] to schedule or change your appointment.  If you had an XRay/CT/Ultrasound/MRI ordered the number is [phone] to schedule or change your radiology appointment.   Medical Concerns:  If you have urgent medical concerns please call [phone] at any time of the day.    Verenice Hopson MD

## 2020-03-23 NOTE — NURSING NOTE
Due to patient being non-English speaking/uses sign language, an  was used for this visit. Only for face-to-face interpretation by an external agency, date and length of interpretation can be found on the scanned worksheet.     name: Shakira Ly   Agency: [employer]  Language: Urdu     Telephone number: [phone]  Type of interpretation: Face-to-face, spoken

## 2020-03-23 NOTE — Clinical Note
Please let patient's daughter, Sharri (number in chart) know lab results: potassium is just a little low, I recommend Diana increase the potassium in her diet. This could be a banana a day, or avocado, etc. Thanks!

## 2020-03-23 NOTE — PROGRESS NOTES
Preceptor Attestation:   Patient seen, evaluated and discussed with the resident. I have verified the content of the note, which accurately reflects my assessment of the patient and the plan of care.   Supervising Physician:  Alfredo Traore MD

## 2020-03-24 NOTE — PROGRESS NOTES
"Jatin Ayala is a 89 year old female who is being evaluated via a billable telephone visit.      The patient has been notified of following:     \"This telephone visit will be conducted via a call between you and your physician/provider. We have found that certain health care needs can be provided without the need for a physical exam.  This service lets us provide the care you need with a short phone conversation.  If a prescription is necessary we can send it directly to your pharmacy.  If lab work is needed we can place an order for that and you can then stop by our lab to have the test done at a later time.    If during the course of the call the physician/provider feels a telephone visit is not appropriate, you will not be charged for this service.\"     Jatin Ayala complains of    Chief Complaint   Patient presents with     left 2nd digit callus         I have reviewed and updated the patient's Past Medical History, Social History, Family History and Medication List.    ALLERGIES  Patient has no known allergies.    Additional provider notes: I initially called Diana through a Upper sorbian . She did not understand the purpose of the call.     I have tried to call Guillermina, her daughter. Will try to call tomorrow from clinic.         Eloy Carrion, [place]      "

## 2020-03-26 NOTE — TELEPHONE ENCOUNTER
"Per PCP, \"lab results: potassium is just a little low, I recommend Diana increase the potassium in her diet. This could be a banana a day, or avocado, etc. Thanks\"    RN reached daughter and relayed providers message.     During call she mentioned that patient has eczema, psoriasis, and paper thin skin. In the past she was prescribed fluocinolone cream and she states this was helpful and is interested in having this sent over again if possible. Routing to PCP to please send this over if appropriate.  Ciara Sandoval RN   "

## 2020-03-26 NOTE — TELEPHONE ENCOUNTER
I am covering her PCP's inbox while she is away. This is not on patient's problem list and if this is not the correct diagnosis, the steroid cream she suggested would make it worse. Would recommend using Eucerin lotion daily as many times as needed and wait until corona virus calms down before coming in for an in person visit to evaluate the skin.     Lauren Sparks MD  PGY-3

## 2020-04-01 NOTE — TELEPHONE ENCOUNTER
Spoke to Nurse Jero @ [phone]. She relates that Diana's toe Is looking inflamed. Diana is putting Iodosorb on the toe and Jero is unsure as to whether the wound is still open. Requesting abx, which I will send in. Will call on Friday to get a check.

## 2020-04-07 NOTE — TELEPHONE ENCOUNTER
Called daughter, Kat, to reply to [employer] message.     Discussed elevated Cr and low potassium. Plan to change lasix to 20mg every other day and start K 10mEq daily. Plan to recheck BMP in 2-4 weeks, daughter will check if facility can draw labs if I provide order, or if we have her come to our clinic for lab only visit. Daughter will message me and let me know.    Fernanda Light, DO  PGY3 Family Medicine Resident  Pager: [phone]

## 2020-04-07 NOTE — TELEPHONE ENCOUNTER

## 2020-04-08 NOTE — TELEPHONE ENCOUNTER
[facility] Call Center    Phone Message    May a detailed message be left on voicemail: yes     Reason for Call: Other: Nurse from nursing home would like a call back to discuss wound care instructions . PLease contact to discuss     Action Taken: Message routed to:  [facility]    Travel Screening: Not Applicable

## 2020-04-21 NOTE — TELEPHONE ENCOUNTER
[facility] Call Center    Phone Message    May a detailed message be left on voicemail: yes     Reason for Call: Other: Calling back in to see why they have not received the medical supplies from [facility] yet. Pt's daughter has purchased supplies to last until they get them. Please follow up when available. Thank you      Action Taken: Message routed to:  [facility]    Travel Screening: Not Applicable

## 2020-05-07 NOTE — TELEPHONE ENCOUNTER
Verify that the refill encounter hasn't been started Yes    [facility] phone call message- patient requesting a refill:    Full Medication Name: potassium chloride ER (KLOR-CON     Dose: Route: Take 1 tablet (10 mEq) by mouth daily      Pharmacy confirmed as   [id number] - [place], [place] - [address]  [address]  [address]  [place] [address]  Phone: [phone] Fax: [phone]  : Yes    Medication tab checked to see if medication has been sent  Yes    Additional Comments: Has been trying to get this filled because she is completely out of it. Had issues this week because the pharmacy was sending it with the incorrect provider name.      OK to leave a message on voice mail? Yes    Advised patient refill may take up to 2 business days? Yes    Primary language: Maori      needed? Yes    Call taken on May 7, 2020 at 3:49 PM by April Bivens    Route to  [facility] REFILL

## 2020-05-19 NOTE — TELEPHONE ENCOUNTER

## 2020-06-25 NOTE — TELEPHONE ENCOUNTER
We have never prescribed tramadol here for her, and was prescribed by her previous primary care doctor at a different facility. If she wants ongoing prescriptions - she needs an in person visit to start a pain contract.    She should see her new PCP to establish this, Dr. Jesica Ayala - to discuss risks of continuing given 89yo.    Fernanda Light,   PGY3 Family Medicine Resident  Pager: [phone]

## 2020-06-25 NOTE — TELEPHONE ENCOUNTER
"Dr. Light please advise a refill request for TRAMADOL 50MG TABLET sig-take 1 tablet by mouth four time daily as needed. I do not see this as previously ordered.    Request for medication refill:    Providers if patient needs an appointment and you are willing to give a one month supply please refill for one month and  send a letter/MyChart using \".SMILLIMITEDREFILL\" .smillimited and route chart to \"P SMI \" (Giving one month refill in non controlled medications is strongly recommended before denial)    If refill has been denied, meaning absolutely no refills without visit, please complete the smart phrase \".smirxrefuse\" and route it to the \"P SMI MED REFILLS\"  pool to inform the patient and the pharmacy.    ANDREINA Feliz 3:45 PM June 25, 2020    "

## 2020-06-26 NOTE — TELEPHONE ENCOUNTER

## 2020-06-26 NOTE — TELEPHONE ENCOUNTER
"Per PCP, \"Please call patient with the following message Your potassium is still a little low at 3.2, despite taking potassium supplement - I would like you to increase your potassium supplement from one tablet daily to two tablets daily.   Thanks \"    RN left message with language line on daughters phone to return call to clinic. Reached patient, however she is confused and we need to speak with daughter.    When daughter calls back, please relay message above.  Ciara Sandoval RN   "

## 2020-07-03 NOTE — TELEPHONE ENCOUNTER
RN contacted daughter and relayed, she got the message earlier and has notified her assisted living facility.  Ciara Sandoval RN

## 2020-07-09 NOTE — TELEPHONE ENCOUNTER
Verify that the refill encounter hasn't been started Yes    [facility] phone call message- patient requesting a refill:    Full Medication Name: TRAMADOL HCL PO     Dose: 4x/day PRN     Pharmacy confirmed as   [id number] - [place], [place] - [address]  [address]  [address]  [facility] [address]  Phone: [phone] Fax: [phone]  : Yes    Medication tab checked to see if medication has been sent  Yes    Additional Comments: none     OK to leave a message on voice mail? Yes    Advised patient refill may take up to 2 business days? Yes    Primary language: Sami      needed? Yes    Call taken on July 9, 2020 at 11:18 AM by Yen Moreno to [facility] MED REFILL

## 2020-07-09 NOTE — TELEPHONE ENCOUNTER
"Request for medication refill: TRAMADOL HCL PO     - There was no description for this medication but it is on the med list, Please advise.     Providers if patient needs an appointment and you are willing to give a one month supply please refill for one month and  send a letter/MyChart using \".SMILLIMITEDREFILL\" .smillimited and route chart to \"P SMI \" (Giving one month refill in non controlled medications is strongly recommended before denial)    If refill has been denied, meaning absolutely no refills without visit, please complete the smart phrase \".smirxrefuse\" and route it to the \"P SMI MED REFILLS\"  pool to inform the patient and the pharmacy.    Jorge Paulino MA        "

## 2020-07-10 NOTE — TELEPHONE ENCOUNTER
RN spoke with Jero and relayed message from Dr. Argueta. RN also explained that Dr. Light is no longer with the clinic and patient will need to establish care with a new provider. She stated they will call back to schedule next week. Writer provided  number.   Eliza Thrasher RN

## 2020-07-10 NOTE — TELEPHONE ENCOUNTER
"\"We have never prescribed tramadol here for her, and was prescribed by her previous primary care doctor at a different facility. If she wants ongoing prescriptions - she needs an in person visit to start a pain contract.\"     I believe she was one of Dr. Light's patients and had asked for this request in June and Dr. Light gave the above reply.    I know I'm taking over some of Dr. Light's patients so if someone could call and have her schedule an in visit person with me or whomever is going to be her new PCP to discuss this prescription that would be great. We would need to discuss if this is the best medication option for her in person, especially due to her age.     Sincerely,     Betty Argueta, DO   Family Medicine  PGY-1          "

## 2020-07-19 NOTE — PROGRESS NOTES
"Family Medicine Video Visit Note  Diana is being evaluated via a billable video visit.             Video Visit Consent     Patient was verbally read the following and verbal consent was obtained.  \"This video visit will be conducted via a call between you and your physician/provider. We have found that certain health care needs can be provided without the need for an in-person physical exam.  This service lets us provide the care you need with a video conversation.  If a prescription is necessary we can send it directly to your pharmacy.  If lab work is needed we can place an order for that and you can then stop by our lab to have the test done at a later time.    If during the course of the call the physician/provider feels a video visit is not appropriate, you will not be charged for this service.\"     (Name person giving consent:  Patient and caregiver   Date verbal consent given:  7/22/2020  Time verbal consent given:  10:05 AM)    Patient would like the video invitation sent by: Send to e-mail at: [email or web address]       Video Visit Start: 10:30 AM        Chronic Pain Initial Visit     Diana Ayala is a 89 year old year old who is  here for evaluation and treatment of chronic pain.   Diana is here for evaluation and to develop a multifaceted chronic pain plan that may or may not include chronic opiate therapy.     Interpreting Sinhala language phone interpretor used.  present for discussion. Pt's daughter phoned in for discussion to provide further history.     Wants tramadol,  thinks depression with pain can't get out from bed. Had Tramadol 2x a day and was able to get out and move around. Tried to use PRN but then pt was asking \"why do I have to suffer.\"     Was getting acupuncture in the past. Was getting steroid shot from Rheumatology in [place]urs[place]. Dr. Remedios Dacosta at [facility] before. Both knees and hip. Had to pick 2 spots at a time. Now with switch to different " health system and COVID does not feel comfortable / no longer has access to these services.     Has used lidocaine and Diclofenac gel. Doesn't think the gel works well. Pt's daughter paying out of pocket for lidocaine patch.     Hip and back pain (reports scoliosis).       Previously been on a pain contract? No  Previous pain diagnosis: back pain / scolisos   Pain location: Left side back, reports severe scoliosis hard to see on video camera. Also has all over joint pain. Has pain in bursas in bilateral hips and knees.   Pain onset: Pain has been chronic past 19 years  Pain is described as Aching   Aggravating factors include: Movement, worse with weather changes  Relieving factors include: tramadol, lidocaine patch     Previous medication treatments:  Opiates - Tramadol  50 mg was prescribed for up to 4 times a day but daughter says she only was taking it roughly 2 times a day. This was confirmed with .   Antiepileptics - none  Antidepressants - Zoloft 25 mg   NSAIDs - none  Muscle relaxants - none  Topicals -  lidocaine patches 4%    Past pain Treatments  Pain Clinic:  No  Physical Therapy:  No  Psychologist:  No  Relaxation techniques/biofeedback:  No  Chiropractor:  No  Acupuncture:  Yes   TENs Unit:  No  Injections:  Yes  Formal self-care education:  No    Current Exercise: Activity ,  None too much pain to move  Substance Use History    reports that she has never smoked. She has never used smokeless tobacco.  Alcohol Use: Never used / No history  History of chemical dependency:  No  Current use of recreational substances N/A  Any substance abuse in household? No    Family History:Substance use  Family History of alcohol abuse? No  Family History of Illicit substance use? No  Family History of prescription medication  abuse? No     Social History  Who lives in your household? Lives in assisted care facility   Recent family or social stressors:  none noted  Who is in your support network?  Daughter. Has been hard during COVID as less visiting and less social activities.     Are you currently working ? No    Sleep:    What is the quality of your sleep? Fair   How many hours do you need? 8 hours    Mental Health  Diagnosis of Depression :   YES   Other MH Diagnosis?:  No  History of Preadolescent Sexual Abuse No       Legal Issues  Are you currently involved in litigation related to your pain complaint? No  Have you ever been arrested or had other legal problems? No  Have you filed a Workers' Compensation/SSI/MVA claim related to your pain complaint? No    --CHARLES/CareEverywhere signed for other providers,  No  --[place] Prescriber s Database reviewed:Yes    What are you hoping to do when your pain is more controlled? Be able to get out of bed and participate in daily activities.   Opioid Use Evaluation Tools     DIRE Score: Patient Selection for Opioid Analgesia      Diagnosis: 2    1 = Benign chronic condition with minimal objective findings or no definite medical diagnosis.  Examples: fibromyalgia, migraine headaches, non-specific back pain.  2 = Slowly progressive condition concordant with moderate pain, or fixed condition with  moderate objective findings. Examples: failed back surgery syndrome, back pain with  moderate degenerative changes, neuropathic pain.  3 = Advanced condition concordant with severe pain with objective findings. Examples:  severe ischemic vascular disease, advanced neuropathy, severe spinal stenosis.    Intractability: 2    1 = Few therapies have been tried and the patient takes a passive role in his/her pain  management process.  2 = Most customary treatments have been tried but the patient is not fully engaged in the pain  management process, or barriers prevent (insurance, transportation, medical illness).  3 = Patient fully engaged in a spectrum of appropriate treatments but with inadequate  response.    Risk (P+C+R+S):     Psychological: 2    1 = Serious personality  dysfunction or mental illness interfering with care. Example: personality disorder, severe affective disorder, significant personality issues.  2 = Personality or mental health interferes moderately. Example: depression or anxiety disorder.  3 = Good communication with clinic. No significant personality dysfunction or mental illness.    Chemical Health: 3    1 = Active or very recent use of illicit drugs, excessive alcohol, or prescription drug abuse.  2 = Chemical coper (uses medications to cope with stress) or history of CD in remission.  3 = No CD history. Not drug-focused or chemically reliant.    Reliability: 2  1 = History of numerous problems: medication misuse, missed appointments, rarely follows through.  2 = Occasional difficulties with compliance, but generally reliable.  3 = Highly reliable patient with meds, appointments & treatment.    Social Support: 3  1 = Life in chaos. Little family support and few close relationships. Loss of most normal life roles.  2 = Reduction in some relationships and life roles.  3 = Supportive family/close relationships. Involved in work or school and no social isolation.     Efficacy score: 3    1 = Poor function or minimal pain relief despite moderate to high doses.  2 = Moderate benefit with function improved in a number of ways (or insufficient info - hasn't tried opioid yet or very low doses or too short of a trial).  3 = Good improvement in pain and function and quality of life with stable doses over time.      Total score (D + I + R + E):     Score 7-13: Not a suitable candidate for long-term opioid analgesia  Score 14-21: May be a good candidate for long-term opioid analgesia  Used with permission by Christiano Olguin M.D.  DIRE Score: 17  No flowsheet data found.     Opioid Risk Tool Score:  No flowsheet data found.   Total Score Risk Category  0 - 3 = Low Risk  of future problems with Opioid     Functional Assessment  Questionnaire -5  No flowsheet data  found.  Functional Assessment  Questionnaire -5  Filled out paper questionnaire will be scanned into chart. Score was 35.     Problem, Medication and Allergy Lists were reviewed and updated if needed.  reviewed and are current..    Patient is an established patient of this clinic..         Review of Systems:   CONSTITUTIONAL: had had moderate fatigue, no unexpected change in weight  SKIN: no worrisome rashes, no worrisome moles, no worrisome lesions  EYES: no acute vision problems or changes  ENT: no ear problems, no mouth problems, no throat problems  RESP: no significant cough, no shortness of breath  CV: no chest pain, no palpitations, no new or worsening peripheral edema  GI: no nausea, no vomiting, no constipation, no diarrhea         Physical Exam:   There were no vitals filed for this visit.  There is no height or weight on file to calculate BMI.     Video visit no vitals able to be taken.     Constitutional: no distress  Psychiatric: mentation appears normal.  Tone of voice and behavior within normal limits over phone  Resp: Nonlabored breathing, speaking full sentences  Neuro: Alert and oriented to self and situation        Results:      -Comprehensive rapid urine drug screen obtained today: No  Pt is at high risk for COVID. Will defer at this time.     Assessment and Plan    Diana Ayala is here for evaluation of chronic pain.    Pt currently has a functional status FAQ 5  Of 35.     Patient is being prescribed 100 mg of tramadol IR per day this is  10 mg Morphine Equivalents}     Naloxone WILL NOT be prescribed. Under 50 morphine equivalents per day.     The etiology of patient's chronic pain is see below diagnosis:     Jatin was seen today for medication problem and toe pain.    Diagnoses and all orders for this visit:    Chronic bilateral low back pain without sciatica  Trochanteric bursitis of both hips  Bursitis of both knees, unspecified bursa  Primary osteoarthritis of both knees  Chronic pain  devon Steiner is a 89 yo female presenting for initial visit for chronic pain. It appears that her pain has progressed so that she is experiencing OA in multiple joints including shoulders, hips, knees. She also is having low to mid back pain. Has tried Tylenol without relief. Would stay away from NSAIDs due to recent elevated creatinine level in late June. Had previously been prescribed Tramadol, up to four times a day, by another physician office. Confirmed on . Will start at 50 mg two times daily. Discussed concerns with pt being older and risks with opiate medication. Discussed this medication with the pharmacology staff that recommended to start on only 50 mg twice a day. Pt and her daughter understand the risks. At this point, I believe the benefits out way the risks and we will resume medication today. Pt has severe limitiess in ADLs due to pain. Goal is for more functional movement and participation in ADLs with this medication. Due to COVID-19 unable to pursue other modalities that pt would benefit from like resuming acupuncture and starting PT (for falls prevention and chronic pain). Could think about bursa joint injections in the future if needed but pt is stating her pain is not as localized at this time to bursa sites. Will readdress other modalities in the future depending on COVID-19 situation. Also refilled lidocaine patches. Of note Pt's daughter has been paying for them out of pocket as they have not been covered by insurance. Tramadol is part of a larger pain plan for patient and this was verbalized to patient and her daughter.     -     Lidocaine (LIDOCARE) 4 % Patch; Place 1 patch onto the skin every 24 hours To prevent lidocaine toxicity, patient should be patch free for 12 hrs daily.  -     traMADol (ULTRAM) 50 MG tablet; Take 1 tablet (50 mg) by mouth 2 times daily as needed for moderate to severe pain    Mild recurrent major depression (H)  Pt's depression has been worse recently  especially with social isolation due to COVID-19.  Discussed risks of QT prolongation especially combined with Tramadol. At this point the benefits out way the risks and a mutual patient / provider decision making was done and it was confirmed that we would increase Sertraline to 50 mg daily. Geriatric dosing recommends lower than adult amounts. Pt would benefit from behavioral health and will address this in future visits.   -     sertraline (ZOLOFT) 25 MG tablet; Take 2 tablets (50 mg) by mouth daily    Exercise discussed and patient and encouraged getting out of bed and walking around with walker with supervision. Pt would benefit from PT. However at this time with COVID-19 will defer due to pt being at high risk.     I explained that the assessment process may take up to 3 visits.   Expectations for CPM were also copied into the patient instructions.    Goals of therapy:     Increase function     Decrease suffering     May not relieve pain  1) Non-medical management: Pt would benefit from restarting acupuncture and physical therapy (for falls prevention and chronic pain). With COVID and pt being high risk will defer for now but this should be on the radar for the future.   2) Non-opioid, medical management: Sertraline increased to 50 mg daily. Discussed risks of QT prolongation especially combined with Tramadol. At this point the benefits out way the risks and a mutual patient / provider decision making was done and it was confirmed that we would increase Sertraline to 50 mg daily.   3) Opioid medical management: Opioid Treatment Agreement completed No, would like to do this and get it signed when pt is able to make an in-person appointment   4) Behavioral Health: Will discuss next visit how we can get pt better connected to mental health     5) If opioids prescribed  patient was asked to bring pill bottle to each appointment and was informed that refills would only be provided at office visits.   6) Asked  patient to follow-up in one month with same provider for CPM #2 visit.   7) Chronic pain syndrome added to the patient s problem list.   8) Follow clinic process to add patient to Clinic Chronic Pain Registry.      Options for treatment and follow-up care were reviewed with the patient. Diana Ayala was engaged and actively involved in the decision making process and verbalized understanding of the options discussed and was satisfied with the final plan.    Follow-up: 1 month to discuss how tramadol is working and if there are any side effects to either Tramadol or Sertraline increase. Need to discuss access to behavioral health. In 3 months, end of Sept / early Oct will need to recheck Kidney function. Increased creatinine recently from normal baseline.     After Visit Information:  Patient declined AVS     Video-Visit Details    Type of service:  Video Visit    Video End Time (time video stopped): 11:00 AM    Originating Location (pt. Location): Assisted Living    Distant Location (provider location):  [facility]     Mode of Communication:  Video Conference via [employer]      DO KANDI Moreno precepted with Dr. Cummings.

## 2020-07-20 NOTE — PATIENT INSTRUCTIONS
Dressing change instructions:    Change daily.  Cleanse with Microklenz.  Pat dry.  Apply a pea-sized amount of Medihoney to the wound.  Cover with DSD.    Clindamycin 300mg TID was sent to pharmacy ([facility]).

## 2020-07-20 NOTE — TELEPHONE ENCOUNTER
RN called and spoke with patient's daughter Sharri. She will call the assisted living and see if they are able to send to the clinic for the 1pm appt with Dr. Carrion.  RN previously has spoken with Deloris and Dr. Carrion and they said they will see her if she is able to come in at 1pm.  Rn later notified Dr. Carrion's team.    Cristóbal Collins RN

## 2020-07-20 NOTE — LETTER
7/20/2020         RE: Jatin Ayala  [address]  [place] [address]        Dear Colleague,    Thank you for referring your patient, Jatin Ayala, to the [facility]. Please see a copy of my visit note below.    Chief Complaint:   Chief Complaint   Patient presents with     Follow Up     Pain and swelling, left 2nd toe.         No Known Allergies      Subjective: Jatin is a 89 year old female who presents to the clinic today for a follow up of left 2nd toe ulcer. She presents today with a . She relates that over the last few weeks, she has had pain and drainage from the toe area. She relates that she has been using a cotton strip from a t-shirt to cover the area. She does have increased pain to the toe when there is a tight dressing on it.     Objective  Last Comprehensive Metabolic Panel:  Sodium   Date Value Ref Range Status   06/25/2020 135.8 132.6 - 141.4 mmol/L Final     Potassium   Date Value Ref Range Status   06/25/2020 3.2 (L) 3.3 - 4.5 mmol/L Final     Chloride   Date Value Ref Range Status   06/25/2020 98.5 98.0 - 110.0 mmol/L Final     Carbon Dioxide   Date Value Ref Range Status   06/25/2020 31.7 20.0 - 32.0 mmol/L Final     Anion Gap   Date Value Ref Range Status   02/13/2016 6 3 - 14 mmol/L Final     Glucose   Date Value Ref Range Status   06/25/2020 70.3 70.0 - 99.0 mg'dL Final     Urea Nitrogen   Date Value Ref Range Status   06/25/2020 26.4 (H) 7.0 - 19.0 mg/dL Final     Creatinine   Date Value Ref Range Status   06/25/2020 1.5 (H) 0.5 - 1.0 mg/dL Final     GFR Estimate   Date Value Ref Range Status   06/25/2020 34.9 (L) >60.0 mL/min/1.7 m2 Final     Calcium   Date Value Ref Range Status   06/25/2020 9.0 8.5 - 10.1 mg/dL Final     Bilirubin Total   Date Value Ref Range Status   11/08/2019 0.6 0.2 - 1.3 mg/dL Final     Alkaline Phosphatase   Date Value Ref Range Status   11/08/2019 78.4 31.7 - 110.5 U/L Final     ALT   Date Value Ref Range Status   11/08/2019  9.3 0.0 - 45.0 U/L Final     AST   Date Value Ref Range Status   11/08/2019 18.2 0.0 - 45.0 U/L Final                 Data Unavailable Data Unavailable Data Unavailable Data Unavailable Data Unavailable 0 lbs 0 oz        A pressure wound is noted at left  medial 2nd digit measuring 0.3cm x 0.5cm . 0.2cm.    Owens Classification: 2    Wound base: Yellow/Slough    Edges: intact    Drainage: slight/serous    Odor: no    Undermining: no    Bone Exposure: No    Clinical Signs of Infection: Yes: surrounding cellulitis    After obtaining patient consent, the wound was irrigated with copious amounts of saline.      Barriers to Healing: Language is a barrier to healing for Diana. She also has a hard time with dressing changes. She is in AL and does not have constant nursing. She is also using non-sterile material and applying them herself.     Treatment Plan: Dressing changes.     Pt Ability to Follow Plan: Limited.     left foot xrays indicated in 3 weightbearing views.    Osteolysis of the head of the proximal phalanx of the 2nd digit, compatible with osteomyelitis.       Assessment: Diana is a 89 YO woman with left pressure ulcer on the 2nd digit with osteomyelitis. She has decreased kidney function and long term antibiotic treatment is likely not a viable option for her. She does have some cellulitis of the digit. Will start some clindamycin for her for this. I discussed with her that because of the bony erosion, she may require and amputation of the digit.      Plan:   - Pt seen and evaluated  - Medihoney and DSD to the toe daily. Orders were written for the nurses.   - Rx for clindamycin.  - Will order MARINO at the next visit.   - Referral to Dr. Davidson.  - Pt to return to clinic in 2 weeks.

## 2020-07-20 NOTE — NURSING NOTE
Reason For Visit:   Chief Complaint   Patient presents with     Follow Up     Pain and swelling, left 2nd toe.        Pain Assessment  Patient Currently in Pain: Yes  Primary Pain Location: Foot(Left)        No Known Allergies        Deloris Cagle LPN

## 2020-07-22 NOTE — PROGRESS NOTES
"              Review of Systems:     {ROS COMP (Optional):699327}         Physical Exam:     There were no vitals taken for this visit.  Estimated body mass index is 21.49 kg/m  as calculated from the following:    Height as of 12/31/19: 1.45 m (4' 9.09\").    Weight as of 3/6/20: 45.2 kg (99 lb 9.6 oz).          Assessment and Plan   {Diag Picklist:768110}    Refilled medications that would be required in the next 3 months.         FIOR TUCKER    { Help text -click delete when highlighted- Precept ALL visits. Preceptor MUST SEE the patient. Bill regular E&M codes for this visit! Documentation needs to support your billing.}      "

## 2020-07-22 NOTE — PROGRESS NOTES
Chief Complaint:   Chief Complaint   Patient presents with     Follow Up     Pain and swelling, left 2nd toe.         No Known Allergies      Subjective: Jatin is a 89 year old female who presents to the clinic today for a follow up of left 2nd toe ulcer. She presents today with a . She relates that over the last few weeks, she has had pain and drainage from the toe area. She relates that she has been using a cotton strip from a t-shirt to cover the area. She does have increased pain to the toe when there is a tight dressing on it.     Objective  Last Comprehensive Metabolic Panel:  Sodium   Date Value Ref Range Status   06/25/2020 135.8 132.6 - 141.4 mmol/L Final     Potassium   Date Value Ref Range Status   06/25/2020 3.2 (L) 3.3 - 4.5 mmol/L Final     Chloride   Date Value Ref Range Status   06/25/2020 98.5 98.0 - 110.0 mmol/L Final     Carbon Dioxide   Date Value Ref Range Status   06/25/2020 31.7 20.0 - 32.0 mmol/L Final     Anion Gap   Date Value Ref Range Status   02/13/2016 6 3 - 14 mmol/L Final     Glucose   Date Value Ref Range Status   06/25/2020 70.3 70.0 - 99.0 mg'dL Final     Urea Nitrogen   Date Value Ref Range Status   06/25/2020 26.4 (H) 7.0 - 19.0 mg/dL Final     Creatinine   Date Value Ref Range Status   06/25/2020 1.5 (H) 0.5 - 1.0 mg/dL Final     GFR Estimate   Date Value Ref Range Status   06/25/2020 34.9 (L) >60.0 mL/min/1.7 m2 Final     Calcium   Date Value Ref Range Status   06/25/2020 9.0 8.5 - 10.1 mg/dL Final     Bilirubin Total   Date Value Ref Range Status   11/08/2019 0.6 0.2 - 1.3 mg/dL Final     Alkaline Phosphatase   Date Value Ref Range Status   11/08/2019 78.4 31.7 - 110.5 U/L Final     ALT   Date Value Ref Range Status   11/08/2019 9.3 0.0 - 45.0 U/L Final     AST   Date Value Ref Range Status   11/08/2019 18.2 0.0 - 45.0 U/L Final                 Data Unavailable Data Unavailable Data Unavailable Data Unavailable Data Unavailable 0 lbs 0 oz        A pressure wound is  noted at left  medial 2nd digit measuring 0.3cm x 0.5cm . 0.2cm.    Owens Classification: 2    Wound base: Yellow/Slough    Edges: intact    Drainage: slight/serous    Odor: no    Undermining: no    Bone Exposure: No    Clinical Signs of Infection: Yes: surrounding cellulitis    After obtaining patient consent, the wound was irrigated with copious amounts of saline.      Barriers to Healing: Language is a barrier to healing for Diana. She also has a hard time with dressing changes. She is in AL and does not have constant nursing. She is also using non-sterile material and applying them herself.     Treatment Plan: Dressing changes.     Pt Ability to Follow Plan: Limited.     left foot xrays indicated in 3 weightbearing views.    Osteolysis of the head of the proximal phalanx of the 2nd digit, compatible with osteomyelitis.       Assessment: Diana is a 89 YO woman with left pressure ulcer on the 2nd digit with osteomyelitis. She has decreased kidney function and long term antibiotic treatment is likely not a viable option for her. She does have some cellulitis of the digit. Will start some clindamycin for her for this. I discussed with her that because of the bony erosion, she may require and amputation of the digit.      Plan:   - Pt seen and evaluated  - Medihoney and DSD to the toe daily. Orders were written for the nurses.   - Rx for clindamycin.  - Will order MARINO at the next visit.   - Referral to Dr. Davidson.  - Pt to return to clinic in 2 weeks.

## 2020-07-22 NOTE — PROGRESS NOTES
Preceptor Attestation:   Patient seen and evaluated via video visit. I discussed the patient with the resident. I have verified the content of the note, which accurately reflects my assessment of the patient and the plan of care.   Supervising Physician:  Mark Cummings MD.

## 2020-07-29 NOTE — NURSING NOTE
Reason For Visit:   Chief Complaint   Patient presents with     Wound Check     Left medial 2nd digit        Pain Assessment  Patient Currently in Pain: Yes  Primary Pain Location: Foot(Left)        No Known Allergies        Deloris Cagle LPN

## 2020-07-29 NOTE — LETTER
7/29/2020         RE: Jatin Ayala  [address]  [facility] [address]        Dear Colleague,    Thank you for referring your patient, Jatin Ayala, to the [facility]. Please see a copy of my visit note below.    Chief Complaint:   Chief Complaint   Patient presents with     Wound Check     Left medial 2nd digit         No Known Allergies      Subjective: Jatin is a 89 year old female who presents to the clinic today for a follow up of left 2nd toe ulcer. She presents today with a . She relates that she finished her abx yesterday. She did not have side effects from the clindamycin. She relates that she has continued to change the dressing herself daily. She has significant pain in the toe. She uses Salonpas for pain control and this is helpful.     Objective  Last Comprehensive Metabolic Panel:  Sodium   Date Value Ref Range Status   06/25/2020 135.8 132.6 - 141.4 mmol/L Final     Potassium   Date Value Ref Range Status   06/25/2020 3.2 (L) 3.3 - 4.5 mmol/L Final     Chloride   Date Value Ref Range Status   06/25/2020 98.5 98.0 - 110.0 mmol/L Final     Carbon Dioxide   Date Value Ref Range Status   06/25/2020 31.7 20.0 - 32.0 mmol/L Final     Anion Gap   Date Value Ref Range Status   02/13/2016 6 3 - 14 mmol/L Final     Glucose   Date Value Ref Range Status   06/25/2020 70.3 70.0 - 99.0 mg'dL Final     Urea Nitrogen   Date Value Ref Range Status   06/25/2020 26.4 (H) 7.0 - 19.0 mg/dL Final     Creatinine   Date Value Ref Range Status   06/25/2020 1.5 (H) 0.5 - 1.0 mg/dL Final     GFR Estimate   Date Value Ref Range Status   06/25/2020 34.9 (L) >60.0 mL/min/1.7 m2 Final     Calcium   Date Value Ref Range Status   06/25/2020 9.0 8.5 - 10.1 mg/dL Final     Bilirubin Total   Date Value Ref Range Status   11/08/2019 0.6 0.2 - 1.3 mg/dL Final     Alkaline Phosphatase   Date Value Ref Range Status   11/08/2019 78.4 31.7 - 110.5 U/L Final     ALT   Date Value Ref Range Status    11/08/2019 9.3 0.0 - 45.0 U/L Final     AST   Date Value Ref Range Status   11/08/2019 18.2 0.0 - 45.0 U/L Final                 Data Unavailable Data Unavailable Data Unavailable Data Unavailable Data Unavailable 0 lbs 0 oz            A pressure wound is noted at left  medial 2nd digit measuring 0.2cm x 0.3cm x 0.2cm.    Owens Classification: 2    Wound base: Yellow/Slough    Edges: intact    Drainage: slight/serous    Odor: no    Undermining: no    Bone Exposure: No    Clinical Signs of Infection: no    After obtaining patient consent, the wound was irrigated with copious amounts of saline.      Barriers to Healing: Language is a barrier to healing for Diana. She also has a hard time with dressing changes. She is in AL and does not have constant nursing. She is also using non-sterile material and applying them herself.     Treatment Plan: Dressing changes.     Pt Ability to Follow Plan: Limited.         Assessment: Diana is a 89 YO woman with left pressure ulcer on the 2nd digit with osteomyelitis. She has decreased kidney function and long term antibiotic treatment is likely not a viable option for her.  I discussed with her that because of the bony erosion, she may require and amputation of the digit.      Plan:   - Pt seen and evaluated  - Betadine and DSD to the toe daily. Orders were written for the nurses.   - MARINO ordered  - Referral to Dr. Davidson.  - Pt to return to clinic in 2 weeks.     Again, thank you for allowing me to participate in the care of your patient.        Sincerely,        Eloy Carrion DPM

## 2020-07-29 NOTE — PROGRESS NOTES
Chief Complaint:   Chief Complaint   Patient presents with     Wound Check     Left medial 2nd digit         No Known Allergies      Subjective: Jatin is a 89 year old female who presents to the clinic today for a follow up of left 2nd toe ulcer. She presents today with a . She relates that she finished her abx yesterday. She did not have side effects from the clindamycin. She relates that she has continued to change the dressing herself daily. She has significant pain in the toe. She uses Salonpas for pain control and this is helpful.     Objective  Last Comprehensive Metabolic Panel:  Sodium   Date Value Ref Range Status   06/25/2020 135.8 132.6 - 141.4 mmol/L Final     Potassium   Date Value Ref Range Status   06/25/2020 3.2 (L) 3.3 - 4.5 mmol/L Final     Chloride   Date Value Ref Range Status   06/25/2020 98.5 98.0 - 110.0 mmol/L Final     Carbon Dioxide   Date Value Ref Range Status   06/25/2020 31.7 20.0 - 32.0 mmol/L Final     Anion Gap   Date Value Ref Range Status   02/13/2016 6 3 - 14 mmol/L Final     Glucose   Date Value Ref Range Status   06/25/2020 70.3 70.0 - 99.0 mg'dL Final     Urea Nitrogen   Date Value Ref Range Status   06/25/2020 26.4 (H) 7.0 - 19.0 mg/dL Final     Creatinine   Date Value Ref Range Status   06/25/2020 1.5 (H) 0.5 - 1.0 mg/dL Final     GFR Estimate   Date Value Ref Range Status   06/25/2020 34.9 (L) >60.0 mL/min/1.7 m2 Final     Calcium   Date Value Ref Range Status   06/25/2020 9.0 8.5 - 10.1 mg/dL Final     Bilirubin Total   Date Value Ref Range Status   11/08/2019 0.6 0.2 - 1.3 mg/dL Final     Alkaline Phosphatase   Date Value Ref Range Status   11/08/2019 78.4 31.7 - 110.5 U/L Final     ALT   Date Value Ref Range Status   11/08/2019 9.3 0.0 - 45.0 U/L Final     AST   Date Value Ref Range Status   11/08/2019 18.2 0.0 - 45.0 U/L Final                 Data Unavailable Data Unavailable Data Unavailable Data Unavailable Data Unavailable 0 lbs 0 oz            A pressure  wound is noted at left  medial 2nd digit measuring 0.2cm x 0.3cm x 0.2cm.    Owens Classification: 2    Wound base: Yellow/Slough    Edges: intact    Drainage: slight/serous    Odor: no    Undermining: no    Bone Exposure: No    Clinical Signs of Infection: no    After obtaining patient consent, the wound was irrigated with copious amounts of saline.      Barriers to Healing: Language is a barrier to healing for Diana. She also has a hard time with dressing changes. She is in AL and does not have constant nursing. She is also using non-sterile material and applying them herself.     Treatment Plan: Dressing changes.     Pt Ability to Follow Plan: Limited.         Assessment: Diana is a 89 YO woman with left pressure ulcer on the 2nd digit with osteomyelitis. She has decreased kidney function and long term antibiotic treatment is likely not a viable option for her.  I discussed with her that because of the bony erosion, she may require and amputation of the digit.      Plan:   - Pt seen and evaluated  - Betadine and DSD to the toe daily. Orders were written for the nurses.   - MARINO ordered  - Referral to Dr. Davidson.  - Pt to return to clinic in 2 weeks.

## 2020-07-30 NOTE — TELEPHONE ENCOUNTER
DIAGNOSIS: Surgical consult for left 2nd toe.   APPOINTMENT DATE: 8.18.20   NOTES STATUS DETAILS   OFFICE NOTE from referring provider Internal 7.20.20 [facility] Orth[facility]  7.29.20 1.14.20 12.30.19   OFFICE NOTE from other specialist N/A    DISCHARGE SUMMARY from hospital N/A    DISCHARGE REPORT from the ER N/A    OPERATIVE REPORT N/A    MEDICATION LIST Internal    EMG (for Spine) N/A    IMPLANT RECORD/STICKER N/A    LABS     CBC/DIFF Internal    CULTURES N/A    INJECTIONS DONE IN RADIOLOGY N/A    MRI N/A    CT SCAN N/A    XRAYS (IMAGES & REPORTS) Internal 7.20.20 12.30.19   TUMOR     PATHOLOGY  Slides & report N/A

## 2020-08-03 NOTE — TELEPHONE ENCOUNTER
"Request for medication refill: traMADol (ULTRAM) 50 MG tablet     Providers if patient needs an appointment and you are willing to give a one month supply please refill for one month and  send a letter/MyChart using \".SMILLIMITEDREFILL\" .smillimited and route chart to \"[facility] \" (Giving one month refill in non controlled medications is strongly recommended before denial)    If refill has been denied, meaning absolutely no refills without visit, please complete the smart phrase \".smirxrefuse\" and route it to the \"[facility] MED REFILLS\"  pool to inform the patient and the pharmacy.    Lynne Fenton, [facility]        "

## 2020-08-06 NOTE — TELEPHONE ENCOUNTER
Request for medication refill:    Reviewed Dr. Hopson's March 2020 note indicating indication for lasix, and Dr. Light's note from April 2020, at which point lasix changed from daily to every other day d/t mildly low K at 3.2. K stable upon recheck and pt on 10mEq K daily.     Refilled lasix for every other day. Continue 10mEq KCl daily.    Placed lab order for BMP.     Routed to  to please schedule lab only visit for repeat BMP, to occur when able. Also need visit with me to evaluate continued safety/utility of lasix. -- This can be in person  (if it works to have same day as lab visit), or can be virtual. Should be within 2-8 weeks.     Jesica Ayala MD

## 2020-08-10 NOTE — TELEPHONE ENCOUNTER
Verify that the refill encounter hasn't been started Yes    [facility] phone call message- patient requesting a refill:    Full Medication Name: traMADol (ULTRAM) 50 MG tablet     Dose: Route: Take 1 tablet (50 mg) by mouth 2 times daily as needed for moderate to severe pain - Oral      Pharmacy confirmed as   [id number] - [place], [place] - [address]  [address]  [address]  [facility] [address]  Phone: [phone] Fax: [phone]  : Yes    Medication tab checked to see if medication has been sent  Yes    Additional Comments:    OK to leave a message on voice mail? Yes    Advised patient refill may take up to 2 business days? Yes    Primary language: Japanese      needed? Yes    Call taken on August 10, 2020 at 8:46 AM by April Bivens    Route to P [facility] MED REFILL

## 2020-08-11 NOTE — TELEPHONE ENCOUNTER
I have pended prescription for tramadol and re-routed to triage nursing pool as well as to Dr. Ramsay to please e-prescribe this script. See note from Dr. Argueta on 7/22. Pt has appointment scheduled w/ me on 9/1.     Jseica Ayala MD

## 2020-08-12 NOTE — TELEPHONE ENCOUNTER
Called patient with language line  to schedule appointment for labs and appointment to discuss medication, lasix. Patient stated she would call with her  because she needs to schedule her appointments when the  is available.

## 2020-08-14 NOTE — TELEPHONE ENCOUNTER
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

## 2020-08-14 NOTE — LETTER
8/14/2020         RE: Jatin Ayala  [address]  [facility] [address]        Dear Colleague,    Thank you for referring your patient, Jatin Ayala, to the [facility]. Please see a copy of my visit note below.    Chief Complaint   Patient presents with     RECHECK     MARINO follow up           No Known Allergies      Subjective: Jatin is a 89 year old female who presents to the clinic today for a follow up of left 2nd toe ulcer. She presents today with her daughter.  She did have MARINO performed.  She relates that she has been wrapping the toe and gauze.  She does have pain to the toe.  She is not on antibiotics currently.    Objective  Last Comprehensive Metabolic Panel:  Sodium   Date Value Ref Range Status   06/25/2020 135.8 132.6 - 141.4 mmol/L Final     Potassium   Date Value Ref Range Status   06/25/2020 3.2 (L) 3.3 - 4.5 mmol/L Final     Chloride   Date Value Ref Range Status   06/25/2020 98.5 98.0 - 110.0 mmol/L Final     Carbon Dioxide   Date Value Ref Range Status   06/25/2020 31.7 20.0 - 32.0 mmol/L Final     Anion Gap   Date Value Ref Range Status   02/13/2016 6 3 - 14 mmol/L Final     Glucose   Date Value Ref Range Status   06/25/2020 70.3 70.0 - 99.0 mg'dL Final     Urea Nitrogen   Date Value Ref Range Status   06/25/2020 26.4 (H) 7.0 - 19.0 mg/dL Final     Creatinine   Date Value Ref Range Status   06/25/2020 1.5 (H) 0.5 - 1.0 mg/dL Final     GFR Estimate   Date Value Ref Range Status   06/25/2020 34.9 (L) >60.0 mL/min/1.7 m2 Final     Calcium   Date Value Ref Range Status   06/25/2020 9.0 8.5 - 10.1 mg/dL Final     Bilirubin Total   Date Value Ref Range Status   11/08/2019 0.6 0.2 - 1.3 mg/dL Final     Alkaline Phosphatase   Date Value Ref Range Status   11/08/2019 78.4 31.7 - 110.5 U/L Final     ALT   Date Value Ref Range Status   11/08/2019 9.3 0.0 - 45.0 U/L Final     AST   Date Value Ref Range Status   11/08/2019 18.2 0.0 - 45.0 U/L Final             A pressure wound is  noted at left  medial 2nd digit measuring 0.2cm x 0.3cm x 0.2cm.    Owens Classification: 2    Wound base: Yellow/Slough    Edges: intact    Drainage: slight/serous    Odor: no    Undermining: no    Bone Exposure: yes- proximal phalanx    Clinical Signs of Infection: no    After obtaining patient consent, the wound was irrigated with copious amounts of saline.      Barriers to Healing: Language is a barrier to healing for Diana. She also has a hard time with dressing changes. She is in AL and does not have constant nursing. She is also using non-sterile material and applying them herself.  If we can get K on board with keeping the dressing change as directed, it may heal a little better.  Her daughter did talk to her about this.    Treatment Plan: Dressing changes.  No plans for surgery at this time, as her ABIs decreased.  Will refer her to see infectious disease.    Pt Ability to Follow Plan: Limited.     MARINO IMPRESSION:  1. RIGHT:       A. Resting MARINO is ABNORMAL, 0.66.       B. Resting TBI is ABNORMAL, 0.19.     2. LEFT:       A. Resting MARINO is ABNORMAL, 0.59.       B. Resting TBI is ABNORMAL, 0.15.     Guidelines:     MARINO Diagnostic Criteria (Based on criteria published in Circulation  2011; 124: 8279-4061):    > 1.4: Non compressible    1.00 - 1.40: Normal    0.91 - 0.99: Borderline    At or below 0.90: Abnormal     MARINO Diagnostic Criteria (Based on ACC/AHA guideline 2008):    >/=1.3 - non compressible vessels    1.00  -1.29 - Normal    0.91 - 0.99 - Borderline    0.41 - 0.90 - Mild to moderate PAD    0.00 - 0.40 - Severe PAD     ISSA MOHAN MD    Assessment: Diana is a 89 YO woman with left pressure ulcer on the 2nd digit with osteomyelitis. She has decreased kidney function and long term antibiotic treatment is likely not a viable option for her.  I would like to get the opinion of infectious disease on this matter.  I discussed with her that because of the decreased MARINO, she is not a candidate to have the toe  amputated.    Plan:   - Pt seen and evaluated  -Hydrofera Blue to the area.  -I did take a culture of the wound.  The wound is not acutely infected so we will wait for antibiotics until this is resulted, if needed.  - Referral to ID  - Pt to return to clinic in 2 weeks.     Again, thank you for allowing me to participate in the care of your patient.        Sincerely,        Eloy Carrion DPM

## 2020-08-14 NOTE — PROGRESS NOTES
Chief Complaint   Patient presents with     RECHECK     MARINO follow up           No Known Allergies      Subjective: Jatin is a 89 year old female who presents to the clinic today for a follow up of left 2nd toe ulcer. She presents today with her daughter.  She did have MARINO performed.  She relates that she has been wrapping the toe and gauze.  She does have pain to the toe.  She is not on antibiotics currently.    Objective  Last Comprehensive Metabolic Panel:  Sodium   Date Value Ref Range Status   06/25/2020 135.8 132.6 - 141.4 mmol/L Final     Potassium   Date Value Ref Range Status   06/25/2020 3.2 (L) 3.3 - 4.5 mmol/L Final     Chloride   Date Value Ref Range Status   06/25/2020 98.5 98.0 - 110.0 mmol/L Final     Carbon Dioxide   Date Value Ref Range Status   06/25/2020 31.7 20.0 - 32.0 mmol/L Final     Anion Gap   Date Value Ref Range Status   02/13/2016 6 3 - 14 mmol/L Final     Glucose   Date Value Ref Range Status   06/25/2020 70.3 70.0 - 99.0 mg'dL Final     Urea Nitrogen   Date Value Ref Range Status   06/25/2020 26.4 (H) 7.0 - 19.0 mg/dL Final     Creatinine   Date Value Ref Range Status   06/25/2020 1.5 (H) 0.5 - 1.0 mg/dL Final     GFR Estimate   Date Value Ref Range Status   06/25/2020 34.9 (L) >60.0 mL/min/1.7 m2 Final     Calcium   Date Value Ref Range Status   06/25/2020 9.0 8.5 - 10.1 mg/dL Final     Bilirubin Total   Date Value Ref Range Status   11/08/2019 0.6 0.2 - 1.3 mg/dL Final     Alkaline Phosphatase   Date Value Ref Range Status   11/08/2019 78.4 31.7 - 110.5 U/L Final     ALT   Date Value Ref Range Status   11/08/2019 9.3 0.0 - 45.0 U/L Final     AST   Date Value Ref Range Status   11/08/2019 18.2 0.0 - 45.0 U/L Final             A pressure wound is noted at left  medial 2nd digit measuring 0.2cm x 0.3cm x 0.2cm.    Owens Classification: 2    Wound base: Yellow/Slough    Edges: intact    Drainage: slight/serous    Odor: no    Undermining: no    Bone Exposure: yes- proximal  phalanx    Clinical Signs of Infection: no    After obtaining patient consent, the wound was irrigated with copious amounts of saline.      Barriers to Healing: Language is a barrier to healing for Diana. She also has a hard time with dressing changes. She is in AL and does not have constant nursing. She is also using non-sterile material and applying them herself.  If we can get K on board with keeping the dressing change as directed, it may heal a little better.  Her daughter did talk to her about this.    Treatment Plan: Dressing changes.  No plans for surgery at this time, as her ABIs decreased.  Will refer her to see infectious disease.    Pt Ability to Follow Plan: Limited.     MARINO IMPRESSION:  1. RIGHT:       A. Resting MARINO is ABNORMAL, 0.66.       B. Resting TBI is ABNORMAL, 0.19.     2. LEFT:       A. Resting MARINO is ABNORMAL, 0.59.       B. Resting TBI is ABNORMAL, 0.15.     Guidelines:     MARINO Diagnostic Criteria (Based on criteria published in Circulation  2011; 124: 2507-9222):    > 1.4: Non compressible    1.00 - 1.40: Normal    0.91 - 0.99: Borderline    At or below 0.90: Abnormal     MARINO Diagnostic Criteria (Based on ACC/AHA guideline 2008):    >/=1.3 - non compressible vessels    1.00  -1.29 - Normal    0.91 - 0.99 - Borderline    0.41 - 0.90 - Mild to moderate PAD    0.00 - 0.40 - Severe PAD     ISSA MOHAN MD    Assessment: Diana is a 89 YO woman with left pressure ulcer on the 2nd digit with osteomyelitis. She has decreased kidney function and long term antibiotic treatment is likely not a viable option for her.  I would like to get the opinion of infectious disease on this matter.  I discussed with her that because of the decreased MARINO, she is not a candidate to have the toe amputated.    Plan:   - Pt seen and evaluated  -Hydrofera Blue to the area.  -I did take a culture of the wound.  The wound is not acutely infected so we will wait for antibiotics until this is resulted, if needed.  - Referral to  ID  - Pt to return to clinic in 2 weeks.

## 2020-08-17 NOTE — TELEPHONE ENCOUNTER
Dr. Ramsay was unable to sign the order. Routing to Dr. Traore who will be precepting this afternoon to please sign.    Lizzy Smith RN

## 2020-08-26 NOTE — PROGRESS NOTES
"Jatin Ayala is a 89 year old female who is being evaluated via a billable video visit.      The patient has been notified of following:     \"This video visit will be conducted via a call between you and your physician/provider. We have found that certain health care needs can be provided without the need for an in-person physical exam.  This service lets us provide the care you need with a video conversation.  If a prescription is necessary we can send it directly to your pharmacy.  If lab work is needed we can place an order for that and you can then stop by our lab to have the test done at a later time.    Video visits are billed at different rates depending on your insurance coverage.  Please reach out to your insurance provider with any questions.    If during the course of the call the physician/provider feels a video visit is not appropriate, you will not be charged for this service.\"    Patient has given verbal consent for Video visit? Yes  How would you like to obtain your AVS? MyChart  If you are dropped from the video visit, the video invite should be resent to: Send to e-mail at: [email or web address]  Will anyone else be joining your video visit? No      Video-Visit Details    Type of service:  Video Visit    Video Time: 40 minutes    Originating Location (pt. Location): Assisted Living Facility    Distant Location (provider location):  [facility]     Platform used for Video Visit: Well      [facility]  Infectious Disease Clinic Note:  New Patient     Patient:  Jatin Ayala, Date of birth 8/18/1927, Medical record number [id number]  Date of Visit:  08/27/2020  Consult requested by Dr. Haritha Carrion  for evaluation of toe ulcer with osteomyelitis         Assessment and Recommendations:   Recommendations:  - Start Doxycyline 100 mg twice daily + Metronidazole 500 mg three times daily  - Anticipate at least 6 weeks of treatment  - Weekly " labs at local clinic: CBC, CMP, CRP, ok to start on 8/28  - Follow up video visit in 2 weeks  - To call ([phone]) Frank, who assist her at the facility, to discuss any issues in the interim  - Look out for side effects like diarrhea, nausea, vomiting, rash  - Take doxycycline with plenty of water every time, stay upright for 30 minutes after, avoid sun exposure while on medication  - Avoid alcohol while on Metronidazole    Assessment:  89 year old with left second tow ulcer and osteomyelitis of head of proximal phalanx, with associated likely cellulitis. Wound swab showed MSSA but a bone culture would be ideal. Unable to get biopsy/amputation die to inadequate healing potential revealed per ABIS. Would empirically treat for 6 weeks while monitoring CRP and her kidneys (Last cr was 1.5, baseline 0.9). OVIVA trial has shown that oral agents with good bioavailability show similar response to IV therapy. Considering her age, language barrier and frail nature, an oral regimen would be most appropriate. Doxyccyline and Metronidazole would cover most expected gram positives and gram negatives.     Pt seen and plan of care discussed with Staff ID Physician Dr. Calixto Neumann  [id number] ID Fellow  Pager: [phone]    ID Staff:  I saw and examined this patient with the fellow by video visit and was present for key portions.  I discussed the assessmsnet and plan with the fellow, and I agree with this note.    Calixto Overton MD  Professor of Medicine           History of the Infectious Disease lllness:     Seen using  services and in the presence of assistance from Assisted living staff as patient was unable to hear well/follow conversations without frequent interruptions.   Diana is a 89 y/o woman with left second toe callus that broke down into an ulcer, now with osetolysis of head of proximal phalax. Referred from podiatry as has decreased ABIs, and is not a candidate to have the toe  amputated. She was also noted by Podiatry to have decreased kidney function, with long term antibiotics not a viable option, and hence requesting our opinion. Patient reports continued and worse pain, redness. No discharge. Denies fevers, chills, sweats. She was treated with Augmentin in the spring for  A week and Clindamycin more recently. Wound swab from podiatry showed MSSA.     Plan:   - Pt seen and evaluated  -Hydrofera Blue to the area.  -I did take a culture of the wound.  The wound is not acutely infected so we will wait for antibiotics until this is resulted, if needed.  - Referral to ID        Review of Systems:  CONSTITUTIONAL:  No fevers or chills. No night sweats.  EYES: negative for icterus  ENT:  negative for hearing loss, tinnitus or sore throat  RESPIRATORY:  negative for cough, sputum, dyspnea  CARDIOVASCULAR:  negative for chest pain, palpitations  GASTROINTESTINAL:  negative for nausea, vomiting, diarrhea or constipation  GENITOURINARY:  negative for dysuria  HEME:  No easy bruising  INTEGUMENT:  negative for rash or pruritus  NEURO:  Negative for headache    Past Medical History:   Diagnosis Date     Gastroesophageal reflux disease      Hypertension        Past Surgical History:   Procedure Laterality Date     CATARACT IOL, RT/LT       REPAIR PTOSIS         Family History   Problem Relation Age of Onset     Glaucoma No family hx of      Macular Degeneration No family hx of        Social History     Social History Narrative     Not on file     Social History     Tobacco Use     Smoking status: Never Smoker     Smokeless tobacco: Never Used   Substance Use Topics     Alcohol use: Not Currently     Drug use: Not Currently       Immunization History   Administered Date(s) Administered     DT (PEDS <7y) 09/27/2000     FLU 6-35 months 10/22/2011     Flu 65+ Years 10/05/2018, 09/18/2019     Influenza (High Dose) 3 valent vaccine 10/10/2014, 10/14/2015, 10/14/2016, 11/03/2017     Influenza (IIV3) PF  "11/09/2000, 09/18/2009, 10/22/2011, 09/19/2013     Pneumo Conj 13-V (2010&after) 10/28/2015     Pneumococcal 23 valent 09/27/2000, 01/01/2004     TDAP Vaccine (Adacel) 11/03/2011     Td (Adult), Adsorbed 05/10/2000     Typhoid IM 04/12/2017       Patient Active Problem List   Diagnosis     Allergic rhinitis     Anxiety state     Chronic low back pain without sciatica     Chronic pain of both knees     CKD (chronic kidney disease) stage 3, GFR 30-59 ml/min (H)     Gastro-esophageal reflux disease without esophagitis     HTN (hypertension)     Hyperlipidemia     Late effects of cerebrovascular disease     Mild recurrent major depression (H)     Osteoporosis     Primary osteoarthritis of both knees     Tear film insufficiency     Vitamin D deficiency         No Known Allergies           Physical Exam:     8/26/2020   1317  Most Recent Value       Temp:  --  97.9  F (36.6  C)  as of 3/23/2020      Pulse:  --  87  as of 3/23/2020      BP:  --  132/78  as of 3/23/2020      Resp:  --  16  as of 3/23/2020      SpO2:  --  98%  as of 3/23/2020      Body Mass Index:   None      Height:  --  1.45 m (4' 9.09\")  as of 12/31/2019      Weight:  --  45.2 kg (99 lb 9.6 oz)  as of 3/6/2020      Body Surface Area:   None      LMP:   None      Pain Score:  Extreme Pain (9)  Extreme Pain (9)  as of 8/26/2020              Physical Examination:   Vital signs:  Respirations appear normal at around 16.  There were no other vitals taken for this video visit  Some aspects of the Physical Exam were unobtainable in the context of this Video Visit, but the following was ascertained:   GENERAL:  Pleasant, conversant, calm, comfortable-sounding in NAD.   HEAD:  NCAT.   EYES:  EOMI, sclerae anicteric, conjunctivae pink without visible injection.   ENT:   Appears to have some hearing deficit.  Normal tone of voice and volume.  No visible external oral lesion.   NECK:  Supple, no visible goiter.   RESP: No cough, no audible wheezing.  Able to talk in " full sentences.   SKIN:  As above   NEURO:  Alert, oriented x 3, memory / cognition normal, moves arms normally x 2.   PSYCH: Normal affect, able to articulate logical thoughts and reason         Laboratory Data:     Metabolic Studies       Recent Labs   Lab Test 06/25/20  1020 03/23/20  1636 11/08/19  1411 02/13/16  1059   .8 132.9 133.4 141   POTASSIUM 3.2* 3.2* 3.7 4.2   CHLORIDE 98.5 95.7* 96.4* 106   CO2 31.7 31.4 26.5 29   ANIONGAP  --   --   --  6   BUN 26.4* 20.4* 22.2* 36*   CR 1.5* 1.0* 0.9 1.17*   GFRESTIMATED 34.9* 53.8* 62.2 44*   GLC 70.3 163.6* 113.8* 116*   MK 9.0 8.5 9.6 8.7   LACT  --   --   --  1.5       Hematology Studies      Recent Labs   Lab Test 11/08/19  1411 02/13/16  1059   WBC  --  5.9   ANEU  --  3.5   ALYM  --  1.8   NEFTALY  --  0.4   AEOS  --  0.2   HGB 12.4 10.9*   HCT 41.1 35.8   PLT  --  326     Urine Studies     Recent Labs   Lab Test 02/13/16  1205   URINEPH 7.0   NITRITE Negative   LEUKEST Negative   WBCU <1     Microbiology:  Last 6 Culture results with specimen source  Culture Micro   Date Value Ref Range Status   08/14/2020 Heavy growth  Staphylococcus aureus   (A)  Final   08/14/2020 No anaerobes isolated  Final   03/27/2006 No anaerobes isolated  Final   03/27/2006 No growth  Final   03/22/2006 No growth after 6 days  Final   03/22/2006 No growth after 6 days  Final    Specimen Description   Date Value Ref Range Status   08/14/2020 Left Foot Ulcer  Final   08/14/2020 Left Foot Ulcer  Final   08/14/2020 Left Foot Ulcer  Final   03/27/2006 Abscess LIVER  Final   03/27/2006 Abscess LIVER  Final   03/27/2006 Abscess LIVER  Final          Imaging:  Results for orders placed or performed in visit on 08/04/20   US MARINO Doppler No Exercise 1-2 Levels Bilateral    Narrative    RESTING ABIs, TBIs, VPRs, and 1st digit PPG 8/4/2020    CLINICAL HISTORY: Left second toe ulcer. Peripheral arterial disease.    COMPARISONS: None available.    REFERRING PROVIDER: BITA GARCIA  CORFIELD    TECHNIQUE: Bilateral ABIs, TBIs, VPRs, and 1st digit PPGs obtained.    FINDINGS:  RIGHT:       Brachial: 149 mmHg       Ankle (PT): 73 mmHg       Ankle (DP): 99 mmHg       1st digit: 29 mmHg         MARINO: 0.66       TBI: 0.19         VPR:            High thigh: Normal            Low thigh: Normal            Calf: Normal            Ankle: ABNORMAL         1st digit PPG: ABNORMAL    LEFT:       Brachial: 148 mmHg       Ankle (PT): 73 mmHg       Ankle (DP): 88 mmHg       1st digit: 23 mmHg         MARINO: 0.59       TBI: 0.15         VPR:            High thigh: Normal            Low thigh: Normal            Calf: Normal            Ankle: ABNORMAL         1st digit PPG: ABNORMAL      Impression    IMPRESSION:  1. RIGHT:       A. Resting MARINO is ABNORMAL, 0.66.       B. Resting TBI is ABNORMAL, 0.19.    2. LEFT:       A. Resting MARINO is ABNORMAL, 0.59.       B. Resting TBI is ABNORMAL, 0.15.    Guidelines:    MARINO Diagnostic Criteria (Based on criteria published in Circulation  2011; 124: 5905-3084):    > 1.4: Non compressible    1.00 - 1.40: Normal    0.91 - 0.99: Borderline    At or below 0.90: Abnormal    MARINO Diagnostic Criteria (Based on ACC/AHA guideline 2008):    >/=1.3 - non compressible vessels    1.00  -1.29 - Normal    0.91 - 0.99 - Borderline    0.41 - 0.90 - Mild to moderate PAD    0.00 - 0.40 - Severe PAD    ISSA MOHAN MD

## 2020-08-26 NOTE — PATIENT INSTRUCTIONS
- Start Doxycyline 100 mg twice daily + Metronidazole 500 mg three times daily  - Anticipate at least 6 weeks of treatment  - Weekly labs at local clinic: CBC, CMP, CRP, ok to start on 8/28  - Follow up video visit in 2 weeks  - To call [phone] Frank to discuss  Any issues in the interim  - Look out for side effects like diarrhea, nausea, vomiting, rash  - Take doxycycline with plenty of water every time, stay upright for 30 minutes after, avoid sun exposure while on medication  - Avoid alcohol while on Metronidazole

## 2020-08-26 NOTE — LETTER
"8/26/2020       RE: Jatin Ayala  [address]  [facility] [address]     Dear Colleague,    Thank you for referring your patient, Jatin Ayala, to the [facility] AND INFECTIOUS DISEASES at [facility]. Please see a copy of my visit note below.    Jatin Ayala is a 89 year old female who is being evaluated via a billable video visit.      The patient has been notified of following:     \"This video visit will be conducted via a call between you and your physician/provider. We have found that certain health care needs can be provided without the need for an in-person physical exam.  This service lets us provide the care you need with a video conversation.  If a prescription is necessary we can send it directly to your pharmacy.  If lab work is needed we can place an order for that and you can then stop by our lab to have the test done at a later time.    Video visits are billed at different rates depending on your insurance coverage.  Please reach out to your insurance provider with any questions.    If during the course of the call the physician/provider feels a video visit is not appropriate, you will not be charged for this service.\"    Patient has given verbal consent for Video visit? Yes  How would you like to obtain your AVS? MyChart  If you are dropped from the video visit, the video invite should be resent to: Send to e-mail at: [email or web address]  Will anyone else be joining your video visit? No      Video-Visit Details    Type of service:  Video Visit    Video Time: 40 minutes    Originating Location (pt. Location): Assisted Living Facility    Distant Location (provider location):  [facility] AND INFECTIOUS DISEASES     Platform used for Video Visit: [employer]  Infectious Disease Clinic Note:  New Patient     Patient:  Jatin Ayala, Date of birth 8/18/1927, Medical record number [id number]  Date " of Visit:  08/27/2020  Consult requested by Dr. Haritha Carrion  for evaluation of toe ulcer with osteomyelitis         Assessment and Recommendations:   Recommendations:  - Start Doxycyline 100 mg twice daily + Metronidazole 500 mg three times daily  - Anticipate at least 6 weeks of treatment  - Weekly labs at local clinic: CBC, CMP, CRP, ok to start on 8/28  - Follow up video visit in 2 weeks  - To call ([phone]) Frank, who assist her at the facility, to discuss any issues in the interim  - Look out for side effects like diarrhea, nausea, vomiting, rash  - Take doxycycline with plenty of water every time, stay upright for 30 minutes after, avoid sun exposure while on medication  - Avoid alcohol while on Metronidazole    Assessment:  89 year old with left second tow ulcer and osteomyelitis of head of proximal phalanx, with associated likely cellulitis. Wound swab showed MSSA but a bone culture would be ideal. Unable to get biopsy/amputation die to inadequate healing potential revealed per ABIS. Would empirically treat for 6 weeks while monitoring CRP and her kidneys (Last cr was 1.5, baseline 0.9). OVIVA trial has shown that oral agents with good bioavailability show similar response to IV therapy. Considering her age, language barrier and frail nature, an oral regimen would be most appropriate. Doxyccyline and Metronidazole would cover most expected gram positives and gram negatives.     Pt seen and plan of care discussed with Staff ID Physician Dr. Calixto Neumann  PGY6 ID Fellow  Pager: [phone]    ID Staff:  I saw and examined this patient with the fellow by video visit and was present for key portions.  I discussed the assessmsnet and plan with the fellow, and I agree with this note.    Calixto Overton MD  Professor of Medicine           History of the Infectious Disease lllness:     Seen using  services and in the presence of assistance from Assisted living staff as  patient was unable to hear well/follow conversations without frequent interruptions.   Diana is a 89 y/o woman with left second toe callus that broke down into an ulcer, now with osetolysis of head of proximal phalax. Referred from podiatry as has decreased ABIs, and is not a candidate to have the toe amputated. She was also noted by Podiatry to have decreased kidney function, with long term antibiotics not a viable option, and hence requesting our opinion. Patient reports continued and worse pain, redness. No discharge. Denies fevers, chills, sweats. She was treated with Augmentin in the spring for  A week and Clindamycin more recently. Wound swab from podiatry showed MSSA.     Plan:   - Pt seen and evaluated  -Hydrofera Blue to the area.  -I did take a culture of the wound.  The wound is not acutely infected so we will wait for antibiotics until this is resulted, if needed.  - Referral to ID        Review of Systems:  CONSTITUTIONAL:  No fevers or chills. No night sweats.  EYES: negative for icterus  ENT:  negative for hearing loss, tinnitus or sore throat  RESPIRATORY:  negative for cough, sputum, dyspnea  CARDIOVASCULAR:  negative for chest pain, palpitations  GASTROINTESTINAL:  negative for nausea, vomiting, diarrhea or constipation  GENITOURINARY:  negative for dysuria  HEME:  No easy bruising  INTEGUMENT:  negative for rash or pruritus  NEURO:  Negative for headache    Past Medical History:   Diagnosis Date     Gastroesophageal reflux disease      Hypertension        Past Surgical History:   Procedure Laterality Date     CATARACT IOL, RT/LT       REPAIR PTOSIS         Family History   Problem Relation Age of Onset     Glaucoma No family hx of      Macular Degeneration No family hx of        Social History     Social History Narrative     Not on file     Social History     Tobacco Use     Smoking status: Never Smoker     Smokeless tobacco: Never Used   Substance Use Topics     Alcohol use: Not Currently     Drug  "use: Not Currently       Immunization History   Administered Date(s) Administered     DT (PEDS <7y) 09/27/2000     FLU 6-35 months 10/22/2011     Flu 65+ Years 10/05/2018, 09/18/2019     Influenza (High Dose) 3 valent vaccine 10/10/2014, 10/14/2015, 10/14/2016, 11/03/2017     Influenza (IIV3) PF 11/09/2000, 09/18/2009, 10/22/2011, 09/19/2013     Pneumo Conj 13-V (2010&after) 10/28/2015     Pneumococcal 23 valent 09/27/2000, 01/01/2004     TDAP Vaccine (Adacel) 11/03/2011     Td (Adult), Adsorbed 05/10/2000     Typhoid IM 04/12/2017       Patient Active Problem List   Diagnosis     Allergic rhinitis     Anxiety state     Chronic low back pain without sciatica     Chronic pain of both knees     CKD (chronic kidney disease) stage 3, GFR 30-59 ml/min (H)     Gastro-esophageal reflux disease without esophagitis     HTN (hypertension)     Hyperlipidemia     Late effects of cerebrovascular disease     Mild recurrent major depression (H)     Osteoporosis     Primary osteoarthritis of both knees     Tear film insufficiency     Vitamin D deficiency         No Known Allergies           Physical Exam:     8/26/2020   1317  Most Recent Value       Temp:  --  97.9  F (36.6  C)  as of 3/23/2020      Pulse:  --  87  as of 3/23/2020      BP:  --  132/78  as of 3/23/2020      Resp:  --  16  as of 3/23/2020      SpO2:  --  98%  as of 3/23/2020      Body Mass Index:   None      Height:  --  1.45 m (4' 9.09\")  as of 12/31/2019      Weight:  --  45.2 kg (99 lb 9.6 oz)  as of 3/6/2020      Body Surface Area:   None      LMP:   None      Pain Score:  Extreme Pain (9)  Extreme Pain (9)  as of 8/26/2020              Physical Examination:   Vital signs:  Respirations appear normal at around 16.  There were no other vitals taken for this video visit  Some aspects of the Physical Exam were unobtainable in the context of this Video Visit, but the following was ascertained:   GENERAL:  Pleasant, conversant, calm, comfortable-sounding in NAD. "   HEAD:  NCAT.   EYES:  EOMI, sclerae anicteric, conjunctivae pink without visible injection.   ENT:   Appears to have some hearing deficit.  Normal tone of voice and volume.  No visible external oral lesion.   NECK:  Supple, no visible goiter.   RESP: No cough, no audible wheezing.  Able to talk in full sentences.   SKIN:  As above   NEURO:  Alert, oriented x 3, memory / cognition normal, moves arms normally x 2.   PSYCH: Normal affect, able to articulate logical thoughts and reason         Laboratory Data:     Metabolic Studies       Recent Labs   Lab Test 06/25/20  1020 03/23/20  1636 11/08/19  1411 02/13/16  1059   .8 132.9 133.4 141   POTASSIUM 3.2* 3.2* 3.7 4.2   CHLORIDE 98.5 95.7* 96.4* 106   CO2 31.7 31.4 26.5 29   ANIONGAP  --   --   --  6   BUN 26.4* 20.4* 22.2* 36*   CR 1.5* 1.0* 0.9 1.17*   GFRESTIMATED 34.9* 53.8* 62.2 44*   GLC 70.3 163.6* 113.8* 116*   MK 9.0 8.5 9.6 8.7   LACT  --   --   --  1.5       Hematology Studies      Recent Labs   Lab Test 11/08/19  1411 02/13/16  1059   WBC  --  5.9   ANEU  --  3.5   ALYM  --  1.8   NEFTALY  --  0.4   AEOS  --  0.2   HGB 12.4 10.9*   HCT 41.1 35.8   PLT  --  326     Urine Studies     Recent Labs   Lab Test 02/13/16  1205   URINEPH 7.0   NITRITE Negative   LEUKEST Negative   WBCU <1     Microbiology:  Last 6 Culture results with specimen source  Culture Micro   Date Value Ref Range Status   08/14/2020 Heavy growth  Staphylococcus aureus   (A)  Final   08/14/2020 No anaerobes isolated  Final   03/27/2006 No anaerobes isolated  Final   03/27/2006 No growth  Final   03/22/2006 No growth after 6 days  Final   03/22/2006 No growth after 6 days  Final    Specimen Description   Date Value Ref Range Status   08/14/2020 Left Foot Ulcer  Final   08/14/2020 Left Foot Ulcer  Final   08/14/2020 Left Foot Ulcer  Final   03/27/2006 Abscess LIVER  Final   03/27/2006 Abscess LIVER  Final   03/27/2006 Abscess LIVER  Final          Imaging:  Results for orders placed or  performed in visit on 08/04/20   US MARINO Doppler No Exercise 1-2 Levels Bilateral    Narrative    RESTING ABIs, TBIs, VPRs, and 1st digit PPG 8/4/2020    CLINICAL HISTORY: Left second toe ulcer. Peripheral arterial disease.    COMPARISONS: None available.    REFERRING PROVIDER: BITA DAVIS    TECHNIQUE: Bilateral ABIs, TBIs, VPRs, and 1st digit PPGs obtained.    FINDINGS:  RIGHT:       Brachial: 149 mmHg       Ankle (PT): 73 mmHg       Ankle (DP): 99 mmHg       1st digit: 29 mmHg         MARINO: 0.66       TBI: 0.19         VPR:            High thigh: Normal            Low thigh: Normal            Calf: Normal            Ankle: ABNORMAL         1st digit PPG: ABNORMAL    LEFT:       Brachial: 148 mmHg       Ankle (PT): 73 mmHg       Ankle (DP): 88 mmHg       1st digit: 23 mmHg         MARINO: 0.59       TBI: 0.15         VPR:            High thigh: Normal            Low thigh: Normal            Calf: Normal            Ankle: ABNORMAL         1st digit PPG: ABNORMAL      Impression    IMPRESSION:  1. RIGHT:       A. Resting MARINO is ABNORMAL, 0.66.       B. Resting TBI is ABNORMAL, 0.19.    2. LEFT:       A. Resting MARINO is ABNORMAL, 0.59.       B. Resting TBI is ABNORMAL, 0.15.    Guidelines:    MARINO Diagnostic Criteria (Based on criteria published in Circulation  2011; 124: 3812-9681):    > 1.4: Non compressible    1.00 - 1.40: Normal    0.91 - 0.99: Borderline    At or below 0.90: Abnormal    MARINO Diagnostic Criteria (Based on ACC/AHA guideline 2008):    >/=1.3 - non compressible vessels    1.00  -1.29 - Normal    0.91 - 0.99 - Borderline    0.41 - 0.90 - Mild to moderate PAD    0.00 - 0.40 - Severe PAD    ISSA MOHAN MD

## 2020-08-28 NOTE — TELEPHONE ENCOUNTER
RN attempted to reach pt via language line but pt could not hear us so RN sent [employer] message    Citlali Nicolas RN

## 2020-08-28 NOTE — TELEPHONE ENCOUNTER

## 2020-08-28 NOTE — TELEPHONE ENCOUNTER
REFILL DENIED    Pt just filled script on 8/17/2020 and this was 30 day prescription. She is scheduled to see me on 9/1. Routed to triage RN to inform pt.     Jesica Ayala

## 2020-09-01 NOTE — PROGRESS NOTES
[facility]  Clinic Note    Assessment and Plan     Jatin Ayala is a 89 year old who presented to clinic today to discuss foot pain, among other things.    1 Osteomyelitis and foot pain:   - Alternate aspercream and capsaicin on skin around (but not on) foot wound. If one helps more than the other, continue using that one.   - Try taking oxycodone instead of tramadol. Tramadol has been stopped (temporarily anyway). Can take 2.5mg tablet of oxycodone up to three times per day. (Pt will use miralax prn.)  - Take 1,000mg tylenol three times per day.   - Prescribed new pads for foot as well.  - Follow plan from ID     2. Chronic bilateral low back pain without sciatica  3. Chronic pain of both knees  - Scheduled tylenol TID  - Will revisit tramadol after trial of oxycodone for acute pain.     4. Iron deficiency anemia, unspecified iron deficiency anemia type. I don't see any colonoscopy ever on file. Does have hx GERD w/ nl EGD back in 2007. Depending on family and pt goals, and result of FIT, would advise EGD or colonoscopy for dx workup.   - Fecal colorectal cancer screen FITT; Future  - Get labs every Friday (this is to monitor infection, but will also help monitor anemia)    5. Advanced care planning. Please get copy of advanced care directive from assisted living facility to share with our clinic.     6. Hard of hearing, bilateral, presbycusis: I will contact [facility] to clarify policy and ask about possibility of exception to in-person  policy d/t pt unable to hear Malay  over phone.     Advised follow-up in 2-4 weeks, or sooner if pain worsens/doesn't improve. At follow-up:   - Review ACP, including code status  - Check in on pain  - Follow-up on anemia         HPI       Jatin Ayala is a 89 year old who presents for Medication Problem (Per patients daughter Augumentin 500mg is not helping with infection of the toe,states is making the pain worse.)    Foot pain is intolerable.  Keeps her up at night. Is slightly better than last week (since starting doxycycline and flagyll for osteomyelitis). Has been going on >2 years. Tylenol and tramadol don't help much. Tramadol used to help when she was on it four times per day, but not helping so much w/ BID. Does help a bit w/ chronic knee and back pain, but not w/ foot pain.     Using lidocaine cream near (but not on) lesion. Helps a little bit.     Assisted living wanting to know if we can prescribe lidocaine cream for left toe pain where she has osteomyelitis.     Doesn't tolerate oral iron d/t constipation. Tried fit test in past, but was too difficult to obtain. Aware of anemia. Had blood transfusions last year. Feeling okay overall. Has FIT test at home, but hasn't used it yet. Can't do it by herself and too embarrassed to get help from ppl at assisted living.     Last fall was a couple of weeks ago. Uses walker at assisted living.     BMs very. Sometimes loose, sometimes constipated and hard. Has miralax and uses PRN.     Daughter w/ her today. Has two other children-- one lives in [place], and one is moving to [place].     Advanced care planning: daughter states they have filled this out together, but it is at the assisted living facility in their possession b/c they were filling out a few things on it too.     Daughter had to take off work to be here w/ mom b/c mom (pt) is 89 and very hard of hearing, so she can't HEAR  over phone and  not allowed here in person.   Patient Active Problem List   Diagnosis     Allergic rhinitis     Anxiety state     Chronic low back pain without sciatica     Chronic pain of both knees     CKD (chronic kidney disease) stage 3, GFR 30-59 ml/min (H)     Gastro-esophageal reflux disease without esophagitis     HTN (hypertension)     Hyperlipidemia     Late effects of cerebrovascular disease     Mild recurrent major depression (H)     Osteoporosis     Primary osteoarthritis of both  "knees     Tear film insufficiency     Vitamin D deficiency     A Malay  was attempted during this visit; however, very hard for patient to hear  over phone, so daughter (in person) did majority of interpretation.     +++++++    Problem, Medication and Allergy Lists were reviewed.      No Known Allergies.  Current Outpatient Medications   Medication     acetaminophen (TYLENOL) 500 MG tablet     amLODIPine (NORVASC) 10 MG tablet     amoxicillin-clavulanate (AUGMENTIN) 500-125 MG tablet     ASPIRIN NOT PRESCRIBED, INTENTIONAL,     capsaicin (ZOSTRIX) 0.025 % external cream     cholecalciferol (VITAMIN D3) 125 mcg (5000 units) capsule     diclofenac (VOLTAREN) 1 % topical gel     doxycycline hyclate (VIBRA-TABS) 100 MG tablet     famotidine (PEPCID) 20 MG tablet     hydrochlorothiazide (HYDRODIURIL) 25 MG tablet     HYPROMELLOSE-DEXTRAN 0.3-0.1% opthalmic solution     LANsoprazole (PREVACID) 30 MG DR capsule     Lidocaine (LIDOCARE) 4 % Patch     lidocaine (LMX4) 4 % external cream     loratadine (CLARITIN) 10 MG tablet     losartan (COZAAR) 50 MG tablet     metroNIDAZOLE (FLAGYL) 500 MG tablet     Multiple Vitamins-Minerals (PRESERVISION AREDS 2) CAPS     oxyCODONE (ROXICODONE) 5 MG tablet     polyethylene glycol-propylene glycol (SYSTANE ULTRA) 0.4-0.3 % SOLN ophthalmic solution     potassium chloride ER (KLOR-CON M) 10 MEQ CR tablet     sertraline (ZOLOFT) 25 MG tablet     Wound Dressings (HYDROFERA BLUE 6\"X6\") PADS     clindamycin (CLEOCIN) 300 MG capsule     furosemide (LASIX) 20 MG tablet     polyethylene glycol (MIRALAX/GLYCOLAX) powder     SALONPAS GEL-PATCH HOT 0.025-1.25 % PTCH     SM ACID REDUCER 10 MG tablet     UNABLE TO FIND     UNABLE TO FIND     No current facility-administered medications for this visit.      Social History     Social History Narrative     Not on file       Patient is an established patient of this clinic..         Review of Systems:   See HPI.         Physical " Exam:   /77   Pulse 88   Temp 99.1  F (37.3  C) (Oral)   Resp 18   SpO2 98%   General appearance: well-developed, thin, interactive, no acute distress  Lungs: breathing comfortably on room air without increased effort  Foot: wrapped/bandaged right now-- did not expose lesion of left foot, but surrounding skin is warm and dry w/o erythema or edema  Neurologic: alert and oriented to person, place, and time; speaks mostly Albanian, but some English, very hard of hearing      Results:     No results found for any visits on 09/01/20.      Options for treatment and follow-up care were reviewed with the patient. Jatin Ayala engaged in the decision making process and verbalized understanding of the options discussed and agreed with the final plan.    Jesica Ayala MD , PGY-2, [facility] Medicine

## 2020-09-01 NOTE — PROGRESS NOTES
Preceptor Attestation:  Patient seen and evaluated in person. I discussed the patient with the resident. I have verified the content of the note, which accurately reflects my assessment of the patient and the plan of care.   Supervising Physician:  Wero Gerber DO.

## 2020-09-01 NOTE — TELEPHONE ENCOUNTER
"Request for medication refill:  Lasiz 20mg  Providers if patient needs an appointment and you are willing to give a one month supply please refill for one month and  send a letter/MyChart using \".SMILLIMITEDREFILL\" .smillimited and route chart to \"P SMI \" (Giving one month refill in non controlled medications is strongly recommended before denial)    If refill has been denied, meaning absolutely no refills without visit, please complete the smart phrase \".smirxrefuse\" and route it to the \"P SMI MED REFILLS\"  pool to inform the patient and the pharmacy.    Emely Reich, CMA        "

## 2020-09-01 NOTE — PATIENT INSTRUCTIONS
Here is the plan from today's visit    1 Osteomyelitis and foot pain:   - Alternate aspercream and capsaicin on skin around (but not on) foot wound. If one helps more than the other, continue using that one.   - Try taking oxycodone instead of tramadol. Tramadol has been stopped (temporarily anyway). Can take 2.5mg tablet of oxycodone up to three times per day.   - Take 1,000mg tylenol three times per day.   - Prescribed new pads for foot as well.    2. Chronic bilateral low back pain without sciatica  3. Chronic pain of both knees  - Scheduled tylenol TID  - Will revisit tramadol after trial of oxycodone for acute pain.     4. Iron deficiency anemia, unspecified iron deficiency anemia type  - Fecal colorectal cancer screen FITT; Future  - Get labs every Friday.     5. Advanced care planning. Please get copy of advanced care directive from assisted living facility to share with our clinic.     I will contact [facility] to clarify policy and ask about possibility of exception to in-person  policy.     Please call or return to clinic if your symptoms don't go away.    Follow up plan  In 2-3 weeks if things are going well or first available if oxycodone is not helping or pain is worse. This can be virtual visit.     Thank you for coming to [facility] today.  Lab Testing:  **If you had lab testing today and your results are reassuring or normal they will be mailed to you or sent through [employer] within 7 days.   **If the lab tests need quick action we will call you with the results.  The phone number we will call with results is # [phone] (home) none (work). If this is not the best number please call our clinic and change the number.  Medication Refills:  If you need any refills please call your pharmacy and they will contact us.   If you need to  your refill at a new pharmacy, please contact the new pharmacy directly. The new pharmacy will help you get your medications transferred faster.    Scheduling:  If you have any concerns about today's visit or wish to schedule another appointment please call our office during normal business hours [phone] (8-5:00 M-F)  If a referral was made to a [facility] and you don't get a call from central scheduling please call [phone].  If a Mammogram was ordered for you at [facility] call [phone] to schedule or change your appointment.  If you had an XRay/CT/Ultrasound/MRI ordered the number is [phone] to schedule or change your radiology appointment.   Medical Concerns:  If you have urgent medical concerns please call [phone] at any time of the day.    Jesica Ayala MD

## 2020-09-01 NOTE — NURSING NOTE
Due to patient being non-English speaking/uses sign language, an  was used for this visit. Only for face-to-face interpretation by an external agency, date and length of interpretation can be found on the scanned worksheet.     name: Shakira Ly   Agency: [employer]  Language: Bengali     Telephone number: [phone]  Type of interpretation:Telphone, spoken

## 2020-09-02 NOTE — TELEPHONE ENCOUNTER
Verify that the refill encounter hasn't been started Yes    [facility] phone call message- patient requesting a refill:    Full Medication Name: furosemide (LASIX) 20 MG tablet     Dose: Route: Take 1 tablet (20 mg) by mouth every other day - Oral     Pharmacy confirmed as   [id number] - [place], [place] - [address]  [address]  [place] [address]  Phone: [phone] Fax: [phone]  : Yes    Medication tab checked to see if medication has been sent  Yes    Additional Comments: Patient is out of medication     OK to leave a message on voice mail? Yes    Advised patient refill may take up to 2 business days? Yes    Primary language: Turkmen      needed? Yes    Call taken on September 2, 2020 at 1:44 PM by April Ann Marie    Route to [facility] MED REFILL

## 2020-09-03 NOTE — PROGRESS NOTES
Chief Complaint   Patient presents with     RECHECK     3 week follow up osteomyelitis and foot pain             No Known Allergies      Subjective: Jatin is a 89 year old female who presents to the clinic today for a follow up of left 2nd toe ulcer. She presents today with her daughter.  She saw ID and they started doxy 100mg BID and metronidazole TID. She is tolerating these well. She relates pain to the toe, but this has decreased some. Relaets that she has been using the Hydrofera Blue.     Objective  Last Comprehensive Metabolic Panel:  Sodium   Date Value Ref Range Status   08/28/2020 138 133 - 144 mmol/L Final     Potassium   Date Value Ref Range Status   08/28/2020 4.4 3.4 - 5.3 mmol/L Final     Chloride   Date Value Ref Range Status   08/28/2020 107 94 - 109 mmol/L Final     Carbon Dioxide   Date Value Ref Range Status   08/28/2020 26 20 - 32 mmol/L Final     Anion Gap   Date Value Ref Range Status   08/28/2020 5 3 - 14 mmol/L Final     Glucose   Date Value Ref Range Status   08/28/2020 90 70 - 99 mg/dL Final     Urea Nitrogen   Date Value Ref Range Status   08/28/2020 19 7 - 30 mg/dL Final     Creatinine   Date Value Ref Range Status   08/28/2020 0.92 0.52 - 1.04 mg/dL Final     GFR Estimate   Date Value Ref Range Status   08/28/2020 54 (L) >60 mL/min/[1.73_m2] Final     Comment:     Non  GFR Calc  Starting 12/18/2018, serum creatinine based estimated GFR (eGFR) will be   calculated using the Chronic Kidney Disease Epidemiology Collaboration   (CKD-EPI) equation.       Calcium   Date Value Ref Range Status   08/28/2020 8.5 8.5 - 10.1 mg/dL Final     Bilirubin Total   Date Value Ref Range Status   08/28/2020 0.4 0.2 - 1.3 mg/dL Final     Alkaline Phosphatase   Date Value Ref Range Status   08/28/2020 102 40 - 150 U/L Final     ALT   Date Value Ref Range Status   08/28/2020 21 0 - 50 U/L Final     AST   Date Value Ref Range Status   08/28/2020 20 0 - 45 U/L Final     Previous ulceration on the  medial left 2nd digit is healed over. Digit has some post-inflammatory hyperpigmentation. No s/s of acute infection noted. Toe overrides the 3rd digit. HAV noted.     Barriers to Healing: Language is a barrier to healing for Diana. She also has a hard time with dressing changes. She is in [place] and does not have constant nursing. She is also using non-sterile material and applying them herself.  If we can get K on board with keeping the dressing change as directed, it may heal a little better.  Her daughter did talk to her about this.    Treatment Plan: Dressing changes.  No plans for surgery at this time, as her ABIs decreased.  Will refer her to see infectious disease.    Pt Ability to Follow Plan: Limited.     MARINO IMPRESSION:  1. RIGHT:       A. Resting MARINO is ABNORMAL, 0.66.       B. Resting TBI is ABNORMAL, 0.19.     2. LEFT:       A. Resting MARINO is ABNORMAL, 0.59.       B. Resting TBI is ABNORMAL, 0.15.     Guidelines:     MARINO Diagnostic Criteria (Based on criteria published in Circulation  2011; 124: 5029-0990):    > 1.4: Non compressible    1.00 - 1.40: Normal    0.91 - 0.99: Borderline    At or below 0.90: Abnormal     MARINO Diagnostic Criteria (Based on ACC/AHA guideline 2008):    >/=1.3 - non compressible vessels    1.00  -1.29 - Normal    0.91 - 0.99 - Borderline    0.41 - 0.90 - Mild to moderate PAD    0.00 - 0.40 - Severe PAD     ISSA MOHAN MD    Assessment: Diana is a 89 YO woman with left pressure ulcer on the 2nd digit with osteomyelitis. Toe is improved.  I discussed with her that because of the decreased MARINO, she is not a candidate to have the toe amputated. She has been changing the dressing as directed.     Plan:   - Pt seen and evaluated  - Optifoam and Lamb's wool to the area.  -Cont abx as directed by ID.   - Pt to return to clinic in 4 weeks.

## 2020-09-04 PROBLEM — H91.13 PRESBYCUSIS OF BOTH EARS: Status: ACTIVE | Noted: 2020-01-01

## 2020-09-04 NOTE — LETTER
9/4/2020         RE: Jatin Ayala  [address]  [place] [address]        Dear Colleague,    Thank you for referring your patient, Jatin Ayala, to the [facility]. Please see a copy of my visit note below.    Chief Complaint   Patient presents with     RECHECK     3 week follow up osteomyelitis and foot pain             No Known Allergies      Subjective: Jatin is a 89 year old female who presents to the clinic today for a follow up of left 2nd toe ulcer. She presents today with her daughter.  She saw ID and they started doxy 100mg BID and metronidazole TID. She is tolerating these well. She relates pain to the toe, but this has decreased some. Relaets that she has been using the Hydrofera Blue.     Objective  Last Comprehensive Metabolic Panel:  Sodium   Date Value Ref Range Status   08/28/2020 138 133 - 144 mmol/L Final     Potassium   Date Value Ref Range Status   08/28/2020 4.4 3.4 - 5.3 mmol/L Final     Chloride   Date Value Ref Range Status   08/28/2020 107 94 - 109 mmol/L Final     Carbon Dioxide   Date Value Ref Range Status   08/28/2020 26 20 - 32 mmol/L Final     Anion Gap   Date Value Ref Range Status   08/28/2020 5 3 - 14 mmol/L Final     Glucose   Date Value Ref Range Status   08/28/2020 90 70 - 99 mg/dL Final     Urea Nitrogen   Date Value Ref Range Status   08/28/2020 19 7 - 30 mg/dL Final     Creatinine   Date Value Ref Range Status   08/28/2020 0.92 0.52 - 1.04 mg/dL Final     GFR Estimate   Date Value Ref Range Status   08/28/2020 54 (L) >60 mL/min/[1.73_m2] Final     Comment:     Non  GFR Calc  Starting 12/18/2018, serum creatinine based estimated GFR (eGFR) will be   calculated using the Chronic Kidney Disease Epidemiology Collaboration   (CKD-EPI) equation.       Calcium   Date Value Ref Range Status   08/28/2020 8.5 8.5 - 10.1 mg/dL Final     Bilirubin Total   Date Value Ref Range Status   08/28/2020 0.4 0.2 - 1.3 mg/dL Final     Alkaline  Phosphatase   Date Value Ref Range Status   08/28/2020 102 40 - 150 U/L Final     ALT   Date Value Ref Range Status   08/28/2020 21 0 - 50 U/L Final     AST   Date Value Ref Range Status   08/28/2020 20 0 - 45 U/L Final     Previous ulceration on the medial left 2nd digit is healed over. Digit has some post-inflammatory hyperpigmentation. No s/s of acute infection noted. Toe overrides the 3rd digit. HAV noted.     Barriers to Healing: Language is a barrier to healing for Diana. She also has a hard time with dressing changes. She is in [place] and does not have constant nursing. She is also using non-sterile material and applying them herself.  If we can get K on board with keeping the dressing change as directed, it may heal a little better.  Her daughter did talk to her about this.    Treatment Plan: Dressing changes.  No plans for surgery at this time, as her ABIs decreased.  Will refer her to see infectious disease.    Pt Ability to Follow Plan: Limited.     MARINO IMPRESSION:  1. RIGHT:       A. Resting MARINO is ABNORMAL, 0.66.       B. Resting TBI is ABNORMAL, 0.19.     2. LEFT:       A. Resting MARINO is ABNORMAL, 0.59.       B. Resting TBI is ABNORMAL, 0.15.     Guidelines:     MARINO Diagnostic Criteria (Based on criteria published in Circulation  2011; 124: 4277-8506):    > 1.4: Non compressible    1.00 - 1.40: Normal    0.91 - 0.99: Borderline    At or below 0.90: Abnormal     MARINO Diagnostic Criteria (Based on ACC/AHA guideline 2008):    >/=1.3 - non compressible vessels    1.00  -1.29 - Normal    0.91 - 0.99 - Borderline    0.41 - 0.90 - Mild to moderate PAD    0.00 - 0.40 - Severe PAD     ISSA MOHAN MD    Assessment: Diana is a 89 YO woman with left pressure ulcer on the 2nd digit with osteomyelitis. Toe is improved.  I discussed with her that because of the decreased MARINO, she is not a candidate to have the toe amputated. She has been changing the dressing as directed.     Plan:   - Pt seen and evaluated  - Optifoam and  Lamb's wool to the area.  -Cont abx as directed by ID.   - Pt to return to clinic in 4 weeks.     Again, thank you for allowing me to participate in the care of your patient.        Sincerely,        Eloy Carrion DPM

## 2020-09-04 NOTE — NURSING NOTE
Reason For Visit:   Chief Complaint   Patient presents with     RECHECK     3 week follow up osteomyelitis and foot pain        There were no vitals taken for this visit.    Pain Assessment  Patient Currently in Pain: Yes  0-10 Pain Scale: 8  Primary Pain Location: Foot    Amanda Chauhan ATC

## 2020-09-08 NOTE — TELEPHONE ENCOUNTER
[facility] Call Center    Phone Message    May a detailed message be left on voicemail: yes     Reason for Call:     Call back Jero nurse at [facility]. Pt was prescribed antibiotics on 08/26/20 and since then loss 8 pounds due to diarrhea and vomiting. Pt has an appt with provider on 08/16/20 for follow up. No earlier appt available for writer.     Action Taken: Message routed to:  [facility] id    Travel Screening: Not Applicable

## 2020-09-09 NOTE — TELEPHONE ENCOUNTER
Spoke with Jero. Pt has only had diarrhea one time. Pt thought the doxy was causing the problem so she didn't take it today but has still had nausea and vomiting. Dr. Kim would like pt to hold Flagyl x2 days and continue doxy to see if it is the cause of her sx's. Jero is in agreement w/plan. I will call her again on Friday, 9/11, and see how pt is feeling. If she is feeling better, will switch Flagyl to Levaquin.  Archana Chinchilla RN

## 2020-09-10 NOTE — PROGRESS NOTES
"Family Medicine Telephone Visit Note               Telephone Visit Consent   Patient was verbally read the following and verbal consent was obtained.    \"Telephone visits are billed at different rates depending on your insurance coverage. During this emergency period, for some insurers they may be billed the same as an in-person visit.  Please reach out to your insurance provider with any questions.  If during the course of the call the physician/provider feels a telephone visit is not appropriate, you will not be charged for this service.\"    Name person giving consent:  Nurse and patient    Date verbal consent given:  9/10/20  Time verbal consent given:  1555         Chief Complaint   Patient presents with     Pain              HPI   Patients name: Diana  Appointment start time:  1558    Currently being treatd by ID w doxy/flagyl for foot osteo. Had some diarrhea with it nausea and vomiting. , diarrhea thought maybe from doxy so this was stopped. Diarrhea been getting better. Is a little weaker but overall her pain (back/joints chronic) is worse with cold and oxycodone ran out but also not very effective..Nurse and daughter think she responding better with mobility/function on tramadol and was not as tired. Have not done chronic pain agreement w us yet.      . 1 week ago we chong labwork on her that showed mild w DALILA.   .     Lasix she usually takes every other day for leg swelling. But nurse thinks she may be too dry. Can we hold? Uses it for le edema. But w diarrha and less PO is quite dry      No Known Allergies           Review of Systems:     Constitutional, HEENT, cardiovascular, pulmonary, gi and gu systems are negative, except as otherwise noted.           Physical Exam:     There were no vitals taken for this visit.  Estimated body mass index is 21.49 kg/m  as calculated from the following:    Height as of 12/31/19: 1.45 m (4' 9.09\").    Weight as of 3/6/20: 45.2 kg (99 lb 9.6 oz).    Exam:  Constitutional: " healthy, alert and no distress  Psychiatric: mentation appears normal and affect normal/bright    Results from last visit:  Orders Only on 09/04/2020   Component Date Value Ref Range Status     CRP Inflammation 09/04/2020 <2.9  0.0 - 8.0 mg/L Final     Sodium 09/04/2020 138  133 - 144 mmol/L Final     Potassium 09/04/2020 4.0  3.4 - 5.3 mmol/L Final     Chloride 09/04/2020 104  94 - 109 mmol/L Final     Carbon Dioxide 09/04/2020 27  20 - 32 mmol/L Final     Anion Gap 09/04/2020 7  3 - 14 mmol/L Final     Glucose 09/04/2020 119* 70 - 99 mg/dL Final     Urea Nitrogen 09/04/2020 32* 7 - 30 mg/dL Final     Creatinine 09/04/2020 1.27* 0.52 - 1.04 mg/dL Final     GFR Estimate 09/04/2020 36* >60 mL/min/[1.73_m2] Final    Comment: Non  GFR Calc  Starting 12/18/2018, serum creatinine based estimated GFR (eGFR) will be   calculated using the Chronic Kidney Disease Epidemiology Collaboration   (CKD-EPI) equation.       GFR Estimate If Black 09/04/2020 42* >60 mL/min/[1.73_m2] Final    Comment:  GFR Calc  Starting 12/18/2018, serum creatinine based estimated GFR (eGFR) will be   calculated using the Chronic Kidney Disease Epidemiology Collaboration   (CKD-EPI) equation.       Calcium 09/04/2020 9.2  8.5 - 10.1 mg/dL Final     Bilirubin Total 09/04/2020 1.7* 0.2 - 1.3 mg/dL Final     Albumin 09/04/2020 3.9  3.4 - 5.0 g/dL Final     Protein Total 09/04/2020 8.1  6.8 - 8.8 g/dL Final     Alkaline Phosphatase 09/04/2020 102  40 - 150 U/L Final     ALT 09/04/2020 17  0 - 50 U/L Final     AST 09/04/2020 12  0 - 45 U/L Final     WBC 09/04/2020 6.4  4.0 - 11.0 10e9/L Final     RBC Count 09/04/2020 3.72* 3.8 - 5.2 10e12/L Final     Hemoglobin 09/04/2020 10.2* 11.7 - 15.7 g/dL Final     Hematocrit 09/04/2020 33.7* 35.0 - 47.0 % Final     MCV 09/04/2020 91  78 - 100 fl Final     MCH 09/04/2020 27.4  26.5 - 33.0 pg Final     MCHC 09/04/2020 30.3* 31.5 - 36.5 g/dL Final     RDW 09/04/2020 17.4* 10.0 - 15.0 %  Final     Platelet Count 09/04/2020 412  150 - 450 10e9/L Final     Diff Method 09/04/2020 Automated Method   Final     % Neutrophils 09/04/2020 67.3  % Final     % Lymphocytes 09/04/2020 22.9  % Final     % Monocytes 09/04/2020 7.8  % Final     % Eosinophils 09/04/2020 0.6  % Final     % Basophils 09/04/2020 0.9  % Final     % Immature Granulocytes 09/04/2020 0.5  % Final     Nucleated RBCs 09/04/2020 0  0 /100 Final     Absolute Neutrophil 09/04/2020 4.3  1.6 - 8.3 10e9/L Final     Absolute Lymphocytes 09/04/2020 1.5  0.8 - 5.3 10e9/L Final     Absolute Monocytes 09/04/2020 0.5  0.0 - 1.3 10e9/L Final     Absolute Eosinophils 09/04/2020 0.0  0.0 - 0.7 10e9/L Final     Absolute Basophils 09/04/2020 0.1  0.0 - 0.2 10e9/L Final     Abs Immature Granulocytes 09/04/2020 0.0  0 - 0.4 10e9/L Final     Absolute Nucleated RBC 09/04/2020 0.0   Final           Assessment and Plan       ICD-10-CM    1. CKD (chronic kidney disease) stage 3, GFR 30-59 ml/min (H)  N18.3    2. Other osteomyelitis of left foot (H)  M86.8X7 traMADol (ULTRAM) 50 MG tablet   3. Chronic bilateral low back pain without sciatica  M54.5     G89.29      Will give short term script of BID tramadol until pt able to come in to talk about/sign chronic narcotic agreement. Given age and functional improvement likely will continue BID tramadol. Continue osteo management per ID. Has lab appt upcoming to re eval creatinine. Come back in next week to see me and we can decide if okay to resume lasix but agree w holding doses for now as pt is dry and would not be surprised if mild DALILA on labwork given diarrha/less PO. CMP on Friday or Monday labs (may not be able to come in Fri)      Update. 9/11 lab shows cr improving. Continue above plan but no need to check CMP at [place] [facility] visit for now.   Refilled medications that would be required in the next 3 months.     After Visit Information:  Patient declined AVS     No follow-ups on file.    Appointment end  time: 1621  This is a telephone visit that took 24 minutes.      Clinician location:  [facility]     Antonino Young MD  I precepted today with Kevin.

## 2020-09-10 NOTE — PROGRESS NOTES
Preceptor Attestation:  I did not speak with the patient at the time of the encounter. Patient discussed with the resident. Assessment and plan reviewed with resident and agreed upon.   Supervising Physician:  DO [facility]

## 2020-09-11 NOTE — TELEPHONE ENCOUNTER
Spoke with Jero today. She says that pt has been feeling better since holding Flagyl. She was in bed yesterday but has been up more today and feels better. Has still been taking doxycycline. Dr. Kim would like to switch Flagyl to Levaquin 750mg PO every other day. Sent script to pharm and let Jero know. Pt has follow up with Dr. Kim next Wed., 9/17.  Archana Chinchilla RN

## 2020-09-15 NOTE — TELEPHONE ENCOUNTER
"Request for medication refill: oxyCODONE (ROXICODONE) 5 MG tablet     Providers if patient needs an appointment and you are willing to give a one month supply please refill for one month and  send a letter/MyChart using \".SMILLIMITEDREFILL\" .smillimited and route chart to \"[facility] \" (Giving one month refill in non controlled medications is strongly recommended before denial)    If refill has been denied, meaning absolutely no refills without visit, please complete the smart phrase \".smirxrefuse\" and route it to the \"[facility] MED REFILLS\"  pool to inform the patient and the pharmacy.    Lynne Fenton, [facility]        "

## 2020-09-15 NOTE — TELEPHONE ENCOUNTER
Limited refill    Routed to FD to please call patient and help her schedule follow-up appointment with any provider (can be virtual) regarding pain/osteomyelitis w/in 4 weeks.     Jesica Ayala

## 2020-09-16 NOTE — TELEPHONE ENCOUNTER
[facility] Call Center    Phone Message    May a detailed message be left on voicemail: yes     Reason for Call: Other: Please call Sharri to let her know. Thank you.     Action Taken: Message routed to:  [facility]  [facility]    Travel Screening: Not Applicable

## 2020-09-16 NOTE — PROGRESS NOTES
"PROVIDER CAN CALL [facility] [phone] SHE IS THE PATIENT'S RN. THEY HAVE AN  AT THE CARE FACILITY NAMED [facility] - SHE WILL DO THE INTERPRETING FOR THE VISIT,    PATIENT'S DAUGHTER ANETTE WOULD LIKE TO BE CONFERENCED IN [phone].       Jatin Ayala is a 89 year old female who is being evaluated via a billable telephone visit.      The patient has been notified of following:     \"This telephone visit will be conducted via a call between you and your physician/provider. We have found that certain health care needs can be provided without the need for a physical exam.  This service lets us provide the care you need with a short phone conversation.  If a prescription is necessary we can send it directly to your pharmacy.  If lab work is needed we can place an order for that and you can then stop by our lab to have the test done at a later time.    Telephone visits are billed at different rates depending on your insurance coverage. During this emergency period, for some insurers they may be billed the same as an in-person visit.  Please reach out to your insurance provider with any questions.    If during the course of the call the physician/provider feels a telephone visit is not appropriate, you will not be charged for this service.\"    Patient has given verbal consent for Telephone visit?  Yes    What phone number would you like to be contacted at?   CALL [facility] -[phone], THEY HAVE AN  \"IN HOUSE\" - PLEASE CONFERENCE IN THE DAUGHTER ANETTE -[phone]    How would you like to obtain your AVS?     Phone call duration:25 minutes          [facility]  Infectious Disease Clinic Note:  Established Patient     Patient:  Jatin Ayala, Date of birth 8/18/1927, Medical record number [id number]  Date of Visit:  09/18/2020  Consult requested by Dr. Haritha Carrion  for evaluation of toe ulcer with osteomyelitis         Assessment and Recommendations: "   Recommendations:  - Continue Doxycyline 100 mg twice daily + Levofloxacin 750 mg every other day  - Anticipate at least 6 weeks of treatment, to complete on 10/7  - Weekly labs at local clinic: CBC, CMP, CRP  - Follow up video visit in 3 weeks on 10/7 for final visit before stopping therapy  - To take medication with food   - Advised to call immediately if any worsening pain, discoloration    Assessment:  89 year old with left second toe ulcer and osteomyelitis of head of proximal phalanx, with associated likely cellulitis. Wound swab showed MSSA but a bone culture would have been ideal. Unable to get biopsy/amputation die to inadequate healing potential revealed per ABIs.    On empiric treatment, at 3/6weeks while monitoring CRP and her kidneys (some DALILA, baseline 09., latest 1.15). Initially was on doxy+flagyl, switched to doxy+levaquin due to diarrhea. CRPs have been normal, but in the setting of chronic indolent infection this is possible. Diarrhea now resolved, some dizziness and nausea remains. Pt in agreement with completing treatment as planned.       Pt plan of care discussed with Staff ID Physician Dr. Tone Neumann  [id number] ID Fellow  Pager: [phone]         History of the Infectious Disease lllness:     Conversation using  services and in the presence of assisted living staff, and two daughters.    Diana is a 89 y/o woman with left second toe callus that broke down into an ulcer, now with osetolysis of head of proximal phalax. Referred from podiatry as had decreased ABIs, and is not a candidate to have the toe amputated. She was also noted by Podiatry to have decreased kidney function, with long term antibiotics not a viable option, and hence requesting our opinion. Patient reported continued and worse pain, redness at first visit 3 weeks ago. No discharge. Denies fevers, chills, sweats. She was treated with Augmentin in the spring for  A week and Clindamycin more  recently. Wound swab from podiatry showed MSSA.    On empiric treatment, at 3/6weeks while monitoring CRP and her kidneys (some DALILA, baseline 09., latest 1.15). Initially was on doxy+flagyl, switched to doxy+levaquin due to diarrhea. Diarrhea now resolved, some dizziness and nausea remains. Overall reports her foot is much better in terms of pain/redness, some purplish discoloration noted.       Review of Systems:  CONSTITUTIONAL:  No fevers or chills. No night sweats.  EYES: negative for icterus  ENT:  negative for hearing loss, tinnitus or sore throat  RESPIRATORY:  negative for cough, sputum, dyspnea  CARDIOVASCULAR:  negative for chest pain, palpitations  GASTROINTESTINAL:  negative for nausea, vomiting, diarrhea or constipation  GENITOURINARY:  negative for dysuria  HEME:  No easy bruising  INTEGUMENT:  negative for rash or pruritus  NEURO:  Negative for headache    Past Medical History:   Diagnosis Date     Gastroesophageal reflux disease      Hypertension        Past Surgical History:   Procedure Laterality Date     CATARACT IOL, RT/LT       REPAIR PTOSIS         Family History   Problem Relation Age of Onset     Glaucoma No family hx of      Macular Degeneration No family hx of        Social History     Social History Narrative     Not on file     Social History     Tobacco Use     Smoking status: Never Smoker     Smokeless tobacco: Never Used   Substance Use Topics     Alcohol use: Not Currently     Drug use: Not Currently       Immunization History   Administered Date(s) Administered     DT (PEDS <7y) 09/27/2000     FLU 6-35 months 10/22/2011     Flu 65+ Years 10/05/2018, 09/18/2019     Influenza (High Dose) 3 valent vaccine 10/10/2014, 10/14/2015, 10/14/2016, 11/03/2017     Influenza (IIV3) PF 11/09/2000, 09/18/2009, 10/22/2011, 09/19/2013     Pneumo Conj 13-V (2010&after) 10/28/2015     Pneumococcal 23 valent 09/27/2000, 01/01/2004     TDAP Vaccine (Adacel) 11/03/2011     Td (Adult), Adsorbed 05/10/2000  "    Typhoid IM 04/12/2017       Patient Active Problem List   Diagnosis     Allergic rhinitis     Anxiety state     Chronic low back pain without sciatica     Chronic pain of both knees     CKD (chronic kidney disease) stage 3, GFR 30-59 ml/min (H)     Gastro-esophageal reflux disease without esophagitis     HTN (hypertension)     Hyperlipidemia     Late effects of cerebrovascular disease     Mild recurrent major depression (H)     Osteoporosis     Primary osteoarthritis of both knees     Tear film insufficiency     Vitamin D deficiency     Presbycusis of both ears         No Known Allergies           Physical Exam:       Vitals     No data found in the last 10 encounters.     Most Recent Value       Temp:  99.1  F (37.3  C)  as of 9/1/2020       Pulse:  88  as of 9/1/2020       BP:  118/77  as of 9/1/2020       Resp:  18  as of 9/1/2020       SpO2:  98%  as of 9/1/2020       Body Mass Index:  None       Height:  1.45 m (4' 9.09\")  as of 12/31/2019       Weight:  45.2 kg (99 lb 9.6 oz)  as of 3/6/2020       Body Surface Area:  None       LMP:  None       Pain Score:  Extreme Pain (9)  as of 8/26/2020            Most aspects of the Physical Exam were unobtainable in the context of this telephone visit, but the following was ascertained:   GENERAL:  Conversant, calm, comfortable-sounding.  ENT: Hearing deficit+  RESP: No cough, no audible wheezing.  Able to talk in full sentences.   NEURO:  Alert, oriented x 3, memory / cognition sound normal.   PSYCH: Normal affect, coherent speech, able to articulate logical thoughts and reason, no tangential thoughts, no hallucinations.                 Laboratory Data:     Metabolic Studies       Recent Labs   Lab Test 09/11/20  1118 09/04/20  1705 08/28/20  1310 06/25/20  1020 03/23/20  1636 11/08/19  1411 02/13/16  1059    138 138 135.8 132.9 133.4 141   POTASSIUM 4.2 4.0 4.4 3.2* 3.2* 3.7 4.2   CHLORIDE 102 104 107 98.5 95.7* 96.4* 106   CO2 27 27 26 31.7 31.4 26.5 29 "   ANIONGAP 6 7 5  --   --   --  6   BUN 32* 32* 19 26.4* 20.4* 22.2* 36*   CR 1.12* 1.27* 0.92 1.5* 1.0* 0.9 1.17*   GFRESTIMATED 42* 36* 54* 34.9* 53.8* 62.2 44*   * 119* 90 70.3 163.6* 113.8* 116*   MK 8.8 9.2 8.5 9.0 8.5 9.6 8.7   LACT  --   --   --   --   --   --  1.5       Hematology Studies      Recent Labs   Lab Test 09/11/20  1118 09/04/20  1705 08/28/20  1310 11/08/19  1411 02/13/16  1059   WBC 5.6 6.4 6.9  --  5.9   ANEU 2.9 4.3 4.6  --  3.5   ALYM 2.0 1.5 1.6  --  1.8   NEFTALY 0.5 0.5 0.5  --  0.4   AEOS 0.1 0.0 0.2  --  0.2   HGB 10.8* 10.2* 9.7* 12.4 10.9*   HCT 35.3 33.7* 32.2* 41.1 35.8    412 359  --  326     Urine Studies     Recent Labs   Lab Test 02/13/16  1205   URINEPH 7.0   NITRITE Negative   LEUKEST Negative   WBCU <1     Microbiology:  Last 6 Culture results with specimen source  Culture Micro   Date Value Ref Range Status   08/14/2020 Heavy growth  Staphylococcus aureus   (A)  Final   08/14/2020 No anaerobes isolated  Final   03/27/2006 No anaerobes isolated  Final   03/27/2006 No growth  Final   03/22/2006 No growth after 6 days  Final   03/22/2006 No growth after 6 days  Final    Specimen Description   Date Value Ref Range Status   08/14/2020 Left Foot Ulcer  Final   08/14/2020 Left Foot Ulcer  Final   08/14/2020 Left Foot Ulcer  Final   03/27/2006 Abscess LIVER  Final   03/27/2006 Abscess LIVER  Final   03/27/2006 Abscess LIVER  Final          Imaging:  Results for orders placed or performed in visit on 08/04/20   US MARINO Doppler No Exercise 1-2 Levels Bilateral    Narrative    RESTING ABIs, TBIs, VPRs, and 1st digit PPG 8/4/2020    CLINICAL HISTORY: Left second toe ulcer. Peripheral arterial disease.    COMPARISONS: None available.    REFERRING PROVIDER: BITA DAVIS    TECHNIQUE: Bilateral ABIs, TBIs, VPRs, and 1st digit PPGs obtained.    FINDINGS:  RIGHT:       Brachial: 149 mmHg       Ankle (PT): 73 mmHg       Ankle (DP): 99 mmHg       1st digit: 29 mmHg          MARINO: 0.66       TBI: 0.19         VPR:            High thigh: Normal            Low thigh: Normal            Calf: Normal            Ankle: ABNORMAL         1st digit PPG: ABNORMAL    LEFT:       Brachial: 148 mmHg       Ankle (PT): 73 mmHg       Ankle (DP): 88 mmHg       1st digit: 23 mmHg         MARINO: 0.59       TBI: 0.15         VPR:            High thigh: Normal            Low thigh: Normal            Calf: Normal            Ankle: ABNORMAL         1st digit PPG: ABNORMAL      Impression    IMPRESSION:  1. RIGHT:       A. Resting MARINO is ABNORMAL, 0.66.       B. Resting TBI is ABNORMAL, 0.19.    2. LEFT:       A. Resting MARINO is ABNORMAL, 0.59.       B. Resting TBI is ABNORMAL, 0.15.    Guidelines:    MARINO Diagnostic Criteria (Based on criteria published in Circulation  2011; 124: 0211-8707):    > 1.4: Non compressible    1.00 - 1.40: Normal    0.91 - 0.99: Borderline    At or below 0.90: Abnormal    MARINO Diagnostic Criteria (Based on ACC/AHA guideline 2008):    >/=1.3 - non compressible vessels    1.00  -1.29 - Normal    0.91 - 0.99 - Borderline    0.41 - 0.90 - Mild to moderate PAD    0.00 - 0.40 - Severe PAD    ISSA MOHAN MD

## 2020-09-22 PROBLEM — M86.9 OSTEOMYELITIS (H): Status: ACTIVE | Noted: 2020-01-01

## 2020-09-22 PROBLEM — N17.9 AKI (ACUTE KIDNEY INJURY) (H): Status: ACTIVE | Noted: 2020-01-01

## 2020-09-22 NOTE — ED NOTES
Pt speaks Khmer, but is also [place] and unable to hear ipad . Writer attempted to call pt daughters who were listed on face sheet but was unable to reach any of them. Charge RN updated. In person Khmer  requested.  Malaika Perez RN on 9/22/2020 at 4:16 PM

## 2020-09-22 NOTE — NURSING NOTE
Due to patient being non-English speaking/uses sign language, an  was used for this visit. Only for face-to-face interpretation by an external agency, date and length of interpretation can be found on the scanned worksheet.     name: Shakira Ly   Agency: [employer]  Language: Faroese     Telephone number: [phone]  Type of interpretation: Face-to-face, spoken    Jorge Paulino MA

## 2020-09-22 NOTE — PATIENT INSTRUCTIONS
1. Hold hydrochlorothiazide, losartan, amlodpine. Check BP and call clinic once over 140 2 days in a row. Will slowly add some back.     2. One ensure with every meal.     3. Can increase voltaren to three times a day. And try tramadol 25mg at bedtime only. Continue using pain patch too.     4. Your kidneys are injured likely from low BP. I think  Going to the emergency room for some fluids and make sure everything is okay is a good idea. Hopefully we can catch this early and get you to feeling better.     5. Look over pain contract we will sign at next visit.     Jatin Ayala  [id number]         September 22, 2020  Informed  Consent  and  Agreement  for  Opioid  Therapy  of  Pain        Reason  for  Review  and  Signing  of  this  Document       Pain  relief  is  an  important  goal  for  your  care.  Opioid  medications  may  be  a  helpful  part  of   chronic  pain  treatment  for  some  people;  however,  misuse  of  opioid  medications  may result  in   serious  harm  to  patients  prescribed  them  and,  when  the  medications  are  diverted,  to  the  public  at   large.  As  opioid  use  for  pain  management  has  increased  in  recent  years,  injury,  addiction,  and   death  due  to  misuse  of  opioids  have  also  increased.    Patients  and  health  care  providers  both  have  responsibilities  for  the  safe  use  of  opioid   medications  when  they  are  prescribed  for  pain.  This  agreement  provides  important information   on  the  potential  benefits  and  risks  of  opioid  medications  and shows that both  you and  your  provider  agree  on  a  care  plan  so  that  opioid  medications  are  used  in  a  way  that  is  safe   and  effective  in  treating  your  pain.    This  agreement  is  reviewed  and  signed  by  all  patients  in  our   practice  who  receive  opioids  for  chronic  pain.          Expected  Benefits  or  Goals  of  Opioid  Treatment     Improved  pain      Improved   ability  to  engage  in  work,  social,  recreational  and/or  physical  activities      Improved  quality  of  life            Potential Risks or Side Effects of Opioid Treatment    Overdose Taking more than the prescribed amount of medication or using with alcohol or other drugs can cause you to stop breathing, resulting in coma, brain damage, or even death. Every single day, more than 40 Americans die from prescription opioid overdoses. The Center for Disease Control and Prevention (CDC) has declared that the [place] is in the middle of an epidemic opioid overdose deaths.      Physical side effects May include mood changes, drowsiness, nausea, constipation, urination difficulties, depressed breathing, itching, bone thinning and sexual difficulties, such as lowering male hormone in men and stopping menstrual periods in women.      Physical dependence Suddenly stopping an opioid may lead to withdrawal symptoms including abdominal cramping, pain, diarrhea, sweating, anxiety, irritability and aching.     Tolerance A dose of an opioid may become less effective over time even though there is no change in your physical condition. If this happens repeatedly, your medication may need to be changed or discontinued.      Addiction Is more common in people with personal or family history of addiction, but can occur in anyone. It is suggested by drug craving, loss of control and may lead to money, relationship, or legal problems.     Hyperalgesia The use of opioids can increase the experience of pain. If this happens, it may require change or discontinuation of medication.      Sleep apnea  (periods of not breathing while asleep) may be caused or worsened by opioids.   Risk to unborn child Risks to unborn children may include: physical dependence at birth, possible alterations in pain perception, possible increased risk for addiction later in life, among others. Tell your provider if you are or intend to become pregnant.        Victimization There is a risk that you or someone in your household may be the victim of theft, deceit, assault or abuse by people trying to take your medications to misuse them.          Responsibilities  in  Opioid  Therapy  of  Chronic  Pain      Your provider's responsibilities: listening carefully to your concerns, treating you with  care and with due respect, and making clinical decisions based on what he/she believes is in  your best interest.    Your responsibilities: In order to maximize the potential benefits of opioid medications and to  minimize the potential risks, it is important that you accept the following responsibilities. In  signing this agreement, you agree to:    1. Use your opioid medications as prescribed for the purpose of relieving pain  2. Keep your medications locked up to avoid intentional or unintentional use or diversion  by others. Discard all unused medications.  3. Be honest with your providers about your medication or other drug use.  4. Use no illegal drugs and not abuse alcohol while being prescribed opioids.  5. Not to share, sell, trade or in any way provide your medications to others.  6. Receive opioid medications from this practice only. If opioids are prescribed  unexpectedly by another office (for example due to an accident or dental procedure),  inform this office within 24 hours.  7. Fill your opioid medications at one pharmacy only. Inform this practice within 24 hours  if you must use a pharmacy different from your usual one.  8. Have urine drugs tests on a random basis and as requested by your provider. (Opioid  may be discontinued if the tests are declined illicit drugs found or medication not present when should be.)  9. Bring your opioid medications to the practice when requested.  10. Participate in other pain treatments agreed to with your provider and keep all  appointments scheduled for your care.  11. Permit this practice to communicate with other care  providers and/or your significant  others as needed to assure opioids are being used appropriately and are beneficial to  your health and well-being  12.  I understand that my clinic can track controlled substance prescriptions from other providers through a central database (prescription monitoring program).  13.   I will tell my clinic right away if I become pregnant or have a new medical problem treated at another clinic    Your medications may be continued if they improve your pain, help you engage in valued  activities, and/or enhance your quality of life and if you follow the above responsibilities.  Your medications may be discontinued if your goals for treatment are not met, if you experience negative  effects from using them, or if you do not follow this agreement.  If you develop complications of opioid use, such as addiction, we will assist you in finding  treatment. Please be aware, however, that our practice cooperates fully with law enforcement,  the [place] Drug Enforcement Agency and other agencies in the investigation of opioid-related  crimes including sharing, selling, trading or other potential harmful use of these powerful  Medications.    I have reviewed this document and been given the opportunity to have any questions  answered. I understand the possible benefits and risks of opioid medications and I accept the  responsibilities described above.    __________________________________         ____________________________________  Patient     Date   Antonino Young MD                     Date

## 2020-09-22 NOTE — ED PROVIDER NOTES
[place] [facility] ([facility])  September 22, 2020  ED 6 4:08 PM   History     Chief Complaint   Patient presents with     Flank Pain     concern for DALILA; kidney infection; very fatigued     The history is provided by the patient and medical records.     Jatin Ayala is a 89 year old Thai speaking female with history of stage 3 CKD, osteomyelitis on antibiotics who presents with fatigue, worsening creatinine and lightheadedness with standing.  Patient has been on levofloxacinand doxycycline for osteomyelitis with cellulitis on her left second toe.  She has been experiencing constipation, diarrhea, loss of appetite, lightheadedness with standing.  She was seen in [facility] today and noted to be more lightheaded and fatigued with lower blood pressures than usual (89-110s, baseline is 130-140s) she had a basic metabolic panel showing creatinine of 1.9 which is up from her baseline of 1.1.  She was instructed to hold hydrochlorothiazide, losartan and amlodipine with plan to add antihypertensives back slowly.  She was also instructed to drink 1 can of Ensure with every meal.  She is sent to the ER for further evaluation and possible admission to see if her creatinine improves with hydration and repeat BMP.  Here patient is really unable to contribute to history, asks us to contact her daughter for further history.  She is also hard of hearing and unable to utilize iPad .    PAST MEDICAL HISTORY:   Past Medical History:   Diagnosis Date     CKD (chronic kidney disease) stage 3, GFR 30-59 ml/min (H)      Gastroesophageal reflux disease      Hypertension        PAST SURGICAL HISTORY:   Past Surgical History:   Procedure Laterality Date     CATARACT IOL, RT/LT       REPAIR PTOSIS         Past medical history, past surgical history, medications, and allergies were reviewed with the patient. Additional pertinent items: None    FAMILY HISTORY:   Family History   Problem  Relation Age of Onset     Glaucoma No family hx of      Macular Degeneration No family hx of        SOCIAL HISTORY:   Social History     Tobacco Use     Smoking status: Never Smoker     Smokeless tobacco: Never Used   Substance Use Topics     Alcohol use: Not Currently     Social history was reviewed with the patient. Additional pertinent items: None      Current Discharge Medication List      CONTINUE these medications which have NOT CHANGED    Details   acetaminophen (TYLENOL) 500 MG tablet Take 2 tablets (1,000 mg) by mouth 3 times daily  Qty: 100 tablet, Refills: 3    Associated Diagnoses: Chronic bilateral low back pain without sciatica; Chronic pain of both knees      amLODIPine (NORVASC) 10 MG tablet Take 1 tablet (10 mg) by mouth every evening  Qty: 90 tablet, Refills: 3    Associated Diagnoses: Essential hypertension      ASPIRIN NOT PRESCRIBED, INTENTIONAL, continuous prn for other Antiplatelet medication not prescribed intentionally due to CHOOSE REASON BEFORE SIGNING!!![id number]      cholecalciferol (VITAMIN D3) 125 mcg (5000 units) capsule TAKE 1 CAPSULE BY MOUTH EVERY MONDAY AND THURSDAY  Refills: 2      diclofenac (VOLTAREN) 1 % topical gel Place 2 g onto the skin 4 times daily as needed for moderate pain Apply to knees, hips, and shoulders  Qty: 100 g, Refills: 3    Associated Diagnoses: Chronic bilateral low back pain without sciatica; Chronic pain of both knees      doxycycline hyclate (VIBRA-TABS) 100 MG tablet Take 1 tablet (100 mg) by mouth 2 times daily  Qty: 84 tablet, Refills: 0    Associated Diagnoses: Osteomyelitis of toe of left foot (H)      !! famotidine (PEPCID) 20 MG tablet Take 1 tablet (20 mg) by mouth daily as needed (heartburn)  Qty: 90 tablet, Refills: 3    Associated Diagnoses: Gastroesophageal reflux disease without esophagitis      furosemide (LASIX) 20 MG tablet Take 1 tablet (20 mg) by mouth every other day  Qty: 15 tablet, Refills: 3    Associated Diagnoses: Bilateral lower  extremity edema      hydrochlorothiazide (HYDRODIURIL) 25 MG tablet Take 1 tablet (25 mg) by mouth daily  Qty: 90 tablet, Refills: 3    Associated Diagnoses: Essential hypertension      HYPROMELLOSE-DEXTRAN 0.3-0.1% opthalmic solution INSTILL TWO DROPS INTO BOTH EYES EVERY 6 HOURS IF NEEDED FOR DRY EYES.  Qty: 30 mL, Refills: 3    Associated Diagnoses: Insufficiency of tear film of both eyes      LANsoprazole (PREVACID) 30 MG DR capsule Take 1 capsule (30 mg) by mouth daily  Qty: 90 capsule, Refills: 3    Associated Diagnoses: Gastroesophageal reflux disease without esophagitis      levofloxacin (LEVAQUIN) 750 MG tablet TAKE 1 TABLET (750 MG) BY MOUTH EVERY OTHER DAY  Qty: 8 tablet, Refills: 0    Associated Diagnoses: Osteomyelitis of toe of left foot (H)      Lidocaine (LIDOCARE) 4 % Patch Place 1 patch onto the skin every 24 hours To prevent lidocaine toxicity, patient should be patch free for 12 hrs daily.  Qty: 10 patch, Refills: 3    Associated Diagnoses: Chronic bilateral low back pain without sciatica      lidocaine (LMX4) 4 % external cream Apply topically once as needed for mild pain  Qty: 120 g, Refills: 1    Associated Diagnoses: Other osteomyelitis of left foot (H)      loratadine (CLARITIN) 10 MG tablet Take 1 tablet (10 mg) by mouth daily  Qty: 90 tablet, Refills: 3    Associated Diagnoses: Non-seasonal allergic rhinitis, unspecified trigger      losartan (COZAAR) 50 MG tablet Take 1 tablet (50 mg) by mouth daily  Qty: 90 tablet, Refills: 3    Associated Diagnoses: Essential hypertension      Multiple Vitamins-Minerals (PRESERVISION AREDS 2) CAPS Take 1 capsule by mouth 2 times daily  Qty: 120 capsule, Refills: 11    Associated Diagnoses: Age-related macular degeneration      Nutritional Supplements (ENSURE ORIGINAL) LIQD Take 1 Bottle by mouth 3 times daily (with meals)  Qty: 237 mL, Refills: 90    Associated Diagnoses: Loss of weight      oxyCODONE (ROXICODONE) 5 MG tablet Take 0.5 tablets (2.5 mg)  "by mouth 3 times daily as needed for moderate to severe pain (Try to prescribe BID, but okay to do TID if need for breakthrough .)  Qty: 32 tablet, Refills: 0    Associated Diagnoses: Other osteomyelitis of left foot (H)      polyethylene glycol (MIRALAX/GLYCOLAX) powder Take 17 g (1 capful) by mouth daily  Qty: 850 g, Refills: 3    Associated Diagnoses: Constipation, unspecified constipation type      polyethylene glycol-propylene glycol (SYSTANE ULTRA) 0.4-0.3 % SOLN ophthalmic solution Place 1 drop into both eyes 4 times daily  Qty: 1 Bottle, Refills: 11    Associated Diagnoses: Ulcerative blepharitis of upper and lower eyelids of both eyes      potassium chloride ER (KLOR-CON M) 10 MEQ CR tablet Take 2 tablets (20 mEq) by mouth daily  Qty: 60 tablet, Refills: 3    Associated Diagnoses: Hypokalemia      SALONPAS GEL-PATCH HOT 0.025-1.25 % PTCH APPLY 1 PATCH TO CLEAN DRY INTACT SKIN ONCE DAILY.LEAVE ON FOR UP TO 12hrs/24hrs      sertraline (ZOLOFT) 25 MG tablet Take 2 tablets (50 mg) by mouth daily  Qty: 60 tablet, Refills: 3    Associated Diagnoses: Mild recurrent major depression (H)      !! SM ACID REDUCER 10 MG tablet TAKE 2 TABS (20MG) BY MOUTH ONCE DAILY      Wound Dressings (HYDROFERA BLUE 6\"X6\") PADS Externally apply 1 patch topically daily  Qty: 10 each, Refills: 1    Associated Diagnoses: Other osteomyelitis of left foot (H)       !! - Potential duplicate medications found. Please discuss with provider.           No Known Allergies     Review of Systems   Constitutional: Positive for fatigue. Negative for fever.   HENT: Negative for congestion.    Eyes: Negative for redness.   Respiratory: Negative for shortness of breath.    Cardiovascular: Negative for chest pain.   Gastrointestinal: Positive for constipation and diarrhea. Negative for abdominal pain.   Genitourinary: Negative for difficulty urinating.   Musculoskeletal: Negative for arthralgias and neck stiffness.   Skin: Negative for color change. "   Neurological: Positive for light-headedness. Negative for headaches.   Psychiatric/Behavioral: Negative for confusion.     A complete review of systems was performed with pertinent positives and negatives noted in the HPI, and all other systems negative.    Physical Exam   BP: 111/78  Pulse: 88  Temp: 97.3  F (36.3  C)  Resp: 16  SpO2: 100 %      Physical Exam  Constitutional:       General: She is not in acute distress.     Appearance: She is not diaphoretic.   HENT:      Head: Atraumatic.      Mouth/Throat:      Mouth: Mucous membranes are dry.      Pharynx: No oropharyngeal exudate.   Eyes:      General: No scleral icterus.     Pupils: Pupils are equal, round, and reactive to light.   Neck:      Musculoskeletal: Neck supple.   Cardiovascular:      Rate and Rhythm: Normal rate and regular rhythm.      Heart sounds: Normal heart sounds. No murmur. No friction rub. No gallop.    Pulmonary:      Effort: Pulmonary effort is normal. No respiratory distress.      Breath sounds: Normal breath sounds. No stridor. No wheezing, rhonchi or rales.   Chest:      Chest wall: No tenderness.   Abdominal:      General: Abdomen is flat. Bowel sounds are normal. There is no distension.      Palpations: Abdomen is soft. There is no mass.      Tenderness: There is abdominal tenderness in the left upper quadrant. There is no right CVA tenderness, left CVA tenderness, guarding or rebound. Negative signs include Morales's sign, Rovsing's sign, McBurney's sign, psoas sign and obturator sign.      Hernia: No hernia is present.       Musculoskeletal:         General: No tenderness.   Skin:     General: Skin is warm.      Findings: No rash.   Neurological:      General: No focal deficit present.         ED Course        Procedures  Results for orders placed during the hospital encounter of 09/22/20   POC US RETROPERITONEAL LIMITED    Impression Limited Bedside Renal Ultrasound, performed and interpreted by me.    Indication: Flank  pain  Images of the the right and left kidney were acquired in short and long axis, evaluating for hydronephrosis. A short and long axis of the bladder was evaluated for bladder stones. Image quality was satisfactory..     Findings:  No evidence of hydronephrosis in bilateral kidneys.    No urinary bladder stones seen.         IMPRESSION: No evidence of hydronephrosis or stones.                 Results for orders placed or performed during the hospital encounter of 09/22/20 (from the past 24 hour(s))   CBC with platelets differential   Result Value Ref Range    WBC 6.1 4.0 - 11.0 10e9/L    RBC Count 3.89 3.8 - 5.2 10e12/L    Hemoglobin 11.1 (L) 11.7 - 15.7 g/dL    Hematocrit 35.3 35.0 - 47.0 %    MCV 91 78 - 100 fl    MCH 28.5 26.5 - 33.0 pg    MCHC 31.4 (L) 31.5 - 36.5 g/dL    RDW 16.7 (H) 10.0 - 15.0 %    Platelet Count 183 150 - 450 10e9/L    Diff Method Automated Method     % Neutrophils 66.5 %    % Lymphocytes 26.0 %    % Monocytes 6.7 %    % Eosinophils 0.2 %    % Basophils 0.3 %    % Immature Granulocytes 0.3 %    Nucleated RBCs 0 0 /100    Absolute Neutrophil 4.1 1.6 - 8.3 10e9/L    Absolute Lymphocytes 1.6 0.8 - 5.3 10e9/L    Absolute Monocytes 0.4 0.0 - 1.3 10e9/L    Absolute Eosinophils 0.0 0.0 - 0.7 10e9/L    Absolute Basophils 0.0 0.0 - 0.2 10e9/L    Abs Immature Granulocytes 0.0 0 - 0.4 10e9/L    Absolute Nucleated RBC 0.0    Comprehensive metabolic panel   Result Value Ref Range    Sodium 135 133 - 144 mmol/L    Potassium 4.0 3.4 - 5.3 mmol/L    Chloride 101 94 - 109 mmol/L    Carbon Dioxide 28 20 - 32 mmol/L    Anion Gap 7 3 - 14 mmol/L    Glucose 90 70 - 99 mg/dL    Urea Nitrogen 43 (H) 7 - 30 mg/dL    Creatinine 1.97 (H) 0.52 - 1.04 mg/dL    GFR Estimate 21 (L) >60 mL/min/[1.73_m2]    GFR Estimate If Black 25 (L) >60 mL/min/[1.73_m2]    Calcium 8.6 8.5 - 10.1 mg/dL    Bilirubin Total 1.1 0.2 - 1.3 mg/dL    Albumin 3.1 (L) 3.4 - 5.0 g/dL    Protein Total 6.6 (L) 6.8 - 8.8 g/dL    Alkaline  Phosphatase 77 40 - 150 U/L    ALT 17 0 - 50 U/L    AST 35 0 - 45 U/L   Lipase   Result Value Ref Range    Lipase 231 73 - 393 U/L   CRP inflammation   Result Value Ref Range    CRP Inflammation <2.9 0.0 - 8.0 mg/L   Erythrocyte sedimentation rate auto   Result Value Ref Range    Sed Rate 5 0 - 30 mm/h   Lactic acid whole blood   Result Value Ref Range    Lactic Acid 1.3 0.7 - 2.0 mmol/L   INR   Result Value Ref Range    INR 1.34 (H) 0.86 - 1.14   POC US RETROPERITONEAL LIMITED    Impression    Limited Bedside Renal Ultrasound, performed and interpreted by me.    Indication: Flank pain  Images of the the right and left kidney were acquired in short and long axis, evaluating for hydronephrosis. A short and long axis of the bladder was evaluated for bladder stones. Image quality was satisfactory..     Findings:  No evidence of hydronephrosis in bilateral kidneys.    No urinary bladder stones seen.         IMPRESSION: No evidence of hydronephrosis or stones.     CT Abdomen Pelvis w/o Contrast    Narrative    EXAMINATION: CT ABDOMEN PELVIS W/O CONTRAST, 9/22/2020 6:24 PM    TECHNIQUE:  Helical CT images from the lung bases through the  symphysis pubis were obtained without contrast.  Coronal reformatted  images were generated at a workstation for further assessment.    COMPARISON: Abdomen and pelvis CT 2/13/2016    HISTORY: Flank pain, stone disease suspected - left    FINDINGS:    Abdomen and pelvis:   Liver: No suspicious liver lesions. No intrahepatic or extrahepatic  biliary ductal dilatation.  Gallbladder: Cholecystectomy.  Spleen: Normal size.  Pancreas: Diffusely atrophic appearance to the pancreas. The  pancreatic duct is not dilated.  Adrenal glands: No adrenal nodules.  Urinary system: No kidney masses. Areas of thickening in the left mid  to superior kidney demonstrated. 4 mm calcification within the  superior pole of the left kidney may be in a calyx with cortical  scarring in this region versus parenchymal.  3 mm nonobstructing stone  within the interpolar region of the right kidney. No hydronephrosis,  hydroureter, or obstructing renal stones. 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 Urinary bladder is unremarkable.  Reproductive organs: Peripherally calcified structures adjacent to the  uterus and adnexal regions stable from prior study. Vascular  calcifications in the uterus also present.  Bowel: Partial visualization of large hiatal hernia. Diverticulosis  without evidence for acute diverticulitis. Duodenal diverticulum. Mild  diffuse haziness of the mesentery is not significantly changed  compared to prior. No abnormally dilated loops of large or small  bowel. No abnormal bowel wall thickening. The appendix is  unremarkable.  Lymph nodes: No retroperitoneal, mesenteric, or pelvic  lymphadenopathy.  Fluid: No free fluid within the abdomen.  Vessels: No infrarenal aortic aneurysm. Extensive atherosclerotic  calcifications of the abdominal aorta and branch vessels. Ectatic  right common iliac artery measuring 1.6 cm (series 5, image 165)  Multiple pelvic phleboliths.    Lung bases: No consolidation or pleural effusion. Fibrosis/scarring at  the left lung base with scattered calcified granulomas within the left  lung base.    Bones and soft tissues: No suspicious osseous lesions. Osteopenic  appearance to the bones. Superior endplate compression deformity of T1  with Schmorl node deformity of the superior endplate. Grade 1  retrolisthesis of L1 on L2, L2 on L3, L3 and L4. Grade 1  anterolisthesis of L5 on S1. Advanced degenerative changes throughout  the visualized spine. Multiple calcifications within the gluteal soft  tissues likely injection granulomas.      Impression    IMPRESSION:   1.  No obstructing renal stone. 4 mm calcification within the superior  pole of the left kidney may be a nonobstructing stone versus  parenchymal dystrophic  calcification. Areas of scarring and cortical  thinning in the mid to superior pole of the left kidney.  2.  Nonobstructing 3 mm renal calculus within the right kidney.  3.  Diverticulosis without evidence for acute diverticulitis.  4.  Large hiatal hernia.  5.  Duodenal diverticulum.  6.  Extensive atherosclerotic calcifications of the abdominal aorta  and its branch vessels with unchanged ectasia of the right common  iliac artery measuring up to 1.6 cm.    I have personally reviewed the examination and initial interpretation  and I agree with the findings.    SAMMY MARTINEZ MD   UA reflex to Microscopic and Culture    Specimen: Urine clean catch; Midstream Urine   Result Value Ref Range    Color Urine Yellow     Appearance Urine Clear     Glucose Urine Negative NEG^Negative mg/dL    Bilirubin Urine Negative NEG^Negative    Ketones Urine Negative NEG^Negative mg/dL    Specific Gravity Urine 1.013 1.003 - 1.035    Blood Urine Negative NEG^Negative    pH Urine 5.5 5.0 - 7.0 pH    Protein Albumin Urine Negative NEG^Negative mg/dL    Urobilinogen mg/dL Normal 0.0 - 2.0 mg/dL    Nitrite Urine Negative NEG^Negative    Leukocyte Esterase Urine Negative NEG^Negative    Source Midstream Urine      Medications   0.9% sodium chloride BOLUS (0 mLs Intravenous Stopped 9/22/20 1801)     Followed by   sodium chloride 0.9% infusion (has no administration in time range)   acetaminophen (TYLENOL) tablet 1,000 mg (has no administration in time range)   diclofenac (VOLTAREN) 1 % topical gel 2 g (has no administration in time range)   doxycycline hyclate (VIBRA-TABS) tablet 100 mg (has no administration in time range)   famotidine (PEPCID) tablet 20 mg (has no administration in time range)   LANsoprazole (PREVACID) CR capsule 30 mg (has no administration in time range)   Lidocaine (LIDOCARE) 4 % Patch 1 patch (has no administration in time range)   lidocaine (LMX4) cream (has no administration in time range)   loratadine (CLARITIN)  "tablet 10 mg (has no administration in time range)   sertraline (ZOLOFT) tablet 50 mg (has no administration in time range)   Hydrofera Blue 6\"x6\" PADS 1 patch (has no administration in time range)   naloxone (NARCAN) injection 0.1-0.4 mg (has no administration in time range)   melatonin tablet 1 mg (has no administration in time range)   ondansetron (ZOFRAN-ODT) ODT tab 4 mg (has no administration in time range)     Or   ondansetron (ZOFRAN) injection 4 mg (has no administration in time range)   magnesium hydroxide (MILK OF MAGNESIA) suspension 30 mL (has no administration in time range)   lactated ringers infusion (has no administration in time range)   traMADol (ULTRAM) half-tab 25 mg (has no administration in time range)             Assessments & Plan (with Medical Decision Making)    Acute kidney injury of unclear etiology and also soft BP without recent change in meds, has been on levaquin for toe osteo and not taking po much per her daughter, Cr slowly creeping up last two weeks, IVF 1 liitter bolus then 125 ml/he for now, D/W [employer]-admit. No UTI, normal ESR CRP.    Left flank pain, POCUS and CT no acute pathologies, probably not related to DALILA.         I have reviewed the nursing notes.    I have reviewed the findings, diagnosis, plan and need for follow up with the patient.    Current Discharge Medication List          Final diagnoses:   Acute kidney injury (H)   Left flank pain   Osteomyelitis of left foot, unspecified type (H)       9/22/2020   [facility], [place], EMERGENCY DEPARTMENT     Damian Lin MD  09/22/20 2318    "

## 2020-09-22 NOTE — PROGRESS NOTES
"       SAVAGE Ayala is a 89 year old  who presents for   Chief Complaint   Patient presents with     Recheck Medication     pt would like to discuss about her tramadol the dosage prescribed was giving her stomache, loss of appetite and feeling like \"rocks\" as pt described. pt also infroms to have a difficult time to have a bowel movement. Pain meds are making her feel dizzy     Dizziness since diarrhea in relation to infection antibiotics. Overall weaker, l  - October 7th supposed to finish doxy/levo for infected osteo/ceelulitis on left 2nd toe and proximal phalanx. Wound culture MSSA unable to do bx    - Has felt more and more dizzy. Bp lower at nursing home and here. Feels dizzy if stands up. BP last couple of days ststolics 98, 100,113,113. Today 89. Usually 130/140s.     BMP shows DALILA today cr up to 1.9 (baseline 1.1)     Some constipation with not eating much. Less appetite         Problem, Medication and Allergy Lists were      Patient Active Problem List    Diagnosis Date Noted     Presbycusis of both ears 09/04/2020     Priority: Medium     CKD (chronic kidney disease) stage 3, GFR 30-59 ml/min (H) 05/30/2018     Priority: Medium     Chronic low back pain without sciatica 04/07/2017     Priority: Medium     Chronic pain of both knees 07/27/2016     Priority: Medium     Primary osteoarthritis of both knees 07/27/2016     Priority: Medium     HTN (hypertension) 10/14/2015     Priority: Medium     Vitamin D deficiency 09/19/2013     Priority: Medium     Mild recurrent major depression (H) 05/15/2012     Priority: Medium     Tear film insufficiency 08/02/2007     Priority: Medium     Osteoporosis 07/28/2007     Priority: Medium     Late effects of cerebrovascular disease 04/02/2007     Priority: Medium     Allergic rhinitis 07/08/2006     Priority: Medium     Anxiety state 07/08/2006     Priority: Medium     Gastro-esophageal reflux disease without esophagitis 07/08/2006     Priority: Medium     " Overview:   had egd in June 07       Hyperlipidemia 07/08/2006     Priority: Medium   ,     ,   No Known Allergies.    Patient is an established patient of this clinic..         Review of Systems:   Review of Systems   Constitutional: Negative for fever and unexpected weight change.   Respiratory: Negative for cough, shortness of breath and wheezing.    Cardiovascular: Negative for chest pain.   Gastrointestinal: Positive for abdominal pain. Negative for blood in stool, constipation, diarrhea, nausea and vomiting.   Genitourinary: Negative for dysuria and hematuria.   Musculoskeletal: Negative for arthralgias and myalgias.   Neurological: Positive for dizziness and weakness. Negative for headaches.            Physical Exam:     Vitals:    09/22/20 1417   BP: (!) 89/58   Pulse: 89   Temp: 98.4  F (36.9  C)   TempSrc: Oral   SpO2: 98%     There is no height or weight on file to calculate BMI.  Vitals were reviewed and were normal     Physical Exam  Constitutional:       General: She is not in acute distress.     Appearance: She is well-developed.      Comments: Tired appearing   HENT:      Head: Normocephalic and atraumatic.      Comments: Dry oral mucosa  Eyes:      Conjunctiva/sclera: Conjunctivae normal.   Cardiovascular:      Rate and Rhythm: Normal rate and regular rhythm.      Heart sounds: Normal heart sounds.   Pulmonary:      Effort: Pulmonary effort is normal. No respiratory distress.   Abdominal:      General: Bowel sounds are normal. There is no distension.      Palpations: Abdomen is soft.      Tenderness: There is no abdominal tenderness.   Musculoskeletal: Normal range of motion.   Skin:     General: Skin is warm.      Capillary Refill: Capillary refill takes less than 2 seconds.      Findings: No rash.   Neurological:      Mental Status: She is alert and oriented to person, place, and time.           [phone] Lavinia mcdaniel       Results:      Results from this visit  Results for orders placed  or performed in visit on 09/22/20   Basic Metabolic Panel ([facility])     Status: Abnormal   Result Value Ref Range    Calcium 9.0 8.5 - 10.1 mg/dL    Chloride 98.8 98.0 - 110.0 mmol/L    Carbon Dioxide 26.9 20.0 - 32.0 mmol/L    Creatinine 1.9 (H) 0.5 - 1.0 mg/dL    Glucose 112.1 (H) 70.0 - 99.0 mg'dL    Potassium 4.1 3.3 - 4.5 mmol/L    Sodium 138.3 132.6 - 141.4 mmol/L    GFR Estimate 26.9 (L) >60.0 mL/min/1.7 m2    GFR Estimate If Black 32.6 (L) >60.0 mL/min/1.7 m2    Urea Nitrogen 45.0 (H) 7.0 - 19.0 mg/dL       Assessment and Plan       Patient w DALILA likely hypotension/dehydration worsening over past 2 weeks slolwly progressive. Held lasix when Bps were normal but past couple of days lower Bps. Will hold all hypertensives. But will need ED eval /possile admission to see if Cr improves with hydration/repeat BMP. Will add back HTNs as able. No signs/symptoms of worsening infection doubt sepsis given stable sx and stable abx regimen for osteo. 911 called and transport by ambulance.     Post discharge hospital tenative plan   1. Hold hydrochlorothiazide, losartan, amlodpine. Check BP and call clinic once over 140 2 days in a row. Will slowly add some back.     2. One ensure with every meal.     3. Can increase voltaren to three times a day. And try tramadol 25mg at bedtime only. Continue using pain patch too.     4. Your kidneys are injured likely from low BP. I think  Going to the emergency room for some fluids and make sure everything is okay is a good idea. Hopefully we can catch this early and get you to feeling better.     5. Look over pain contract we will sign at next visit.          There are no discontinued medications.    Options for treatment and follow-up care were reviewed with the patient. Jatin Ayala  engaged in the decision making process and verbalized understanding of the options discussed and agreed with the final plan.    Antonino Young MD

## 2020-09-22 NOTE — NURSING NOTE
"RN was asked by Dr. Young to facilitate transfer of patient to [facility] Emergency Department for DALILA and fluid resuscitation with possible admission. RN called 911 and requested transportation. [facility] came and transported patient around 1515.     RN contacted patient's EC at her request- daughter Sharri Hall [phone]. RN informed her of the transfer and relayed message form Dr. Young's note below. RN also provided address and contact information for [facility].     \"Patient w DALILA likely hypotension/dehydration worsening over past 2 weeks slolwly progressive. Held lasix when Bps were normal but past couple of days lower Bps. Will hold all hypertensives. But will need ED eval /possile admission to see if Cr improves with hydration/repeat BMP. Will add back HTNs as able. No signs/symptoms of worsening infection doubt sepsis given stable sx and stable abx regimen for osteo. 911 called and transport by ambulance.\"     RN also called [facility] and gave brief patient handoff to ED charge nurse Clement.   Eliza Thrasher, RN    "

## 2020-09-22 NOTE — ED NOTES
Bed: ED06  Expected date:   Expected time:   Means of arrival:   Comments:  HEMS 447  89Y F  Acute Kidney Injury  YELLOW

## 2020-09-23 PROBLEM — N18.30 CKD (CHRONIC KIDNEY DISEASE) STAGE 3, GFR 30-59 ML/MIN (H): Status: ACTIVE | Noted: 2018-05-30

## 2020-09-23 PROBLEM — R63.4 WEIGHT LOSS: Status: ACTIVE | Noted: 2020-01-01

## 2020-09-23 PROBLEM — R11.11 NON-INTRACTABLE VOMITING WITHOUT NAUSEA: Status: ACTIVE | Noted: 2020-01-01

## 2020-09-23 NOTE — PROGRESS NOTES
Care Coordinator Progress Note    Admission Date/Time:  9/22/2020  Attending MD:  Violetta Brown DO    Data  Chart reviewed, discussed with interdisciplinary team.   Patient was admitted for:    Acute kidney injury (H)  Left flank pain  Osteomyelitis of left foot, unspecified type (H).    Concerns with insurance coverage for discharge needs: None.  Current Living Situation: Patient lives in an assisted living facility.  Support System: Supportive  Services Involved: Assisted Living  Transportation at Discharge: Car and Family or friend will provide  Transportation to Medical Appointments:   - Name of caregiver: ALEKSANDRA Harrell  Barriers to Discharge: medically stable     Assessment  D: Plan of care discussed with Medical Team at Interdisciplinary Rounds, plan for patient to discharge today vs tomorrow.      I/A: Chart reviewed; spoke with MARTA Amos at [facility] to confirm services. The [facility] provides medication set up and administration (any new meds should be sent to [facility] in [place]), safety checks, meals and general assist. Patient needs to be able to move and get to the bath independently. If patient discharges today, she would need to return to the [facility] by 4 PM.    Writer spoke with patient's daughter, Sharri, and discussed home care for patient for RN, PT, OT. Sharri is agreeable to this. Choice of agency was provided noting Medicare.gov and star ratings. Sharri would like referral sent to [facility]. Orders placed and referral sent. Charissa updated and plan to discharge today.        P: Care Coordinator will remain available for discharge needs that may arise.     Plan  Anticipated Discharge Date:  Today vs tomorrow  Anticipated Discharge Plan:  [facility]    Magy Mcwilliams RN, A  Care Coordinator  Phone: [phone] Pager: [phone]  [facility]     To contact Weekend [facility], page [phone]

## 2020-09-23 NOTE — PLAN OF CARE
Observation goals  PRIOR TO DISCHARGE      Comments: -diagnostic tests and consults completed and resulted- In process  -vital signs normal or at patient baseline- MET  -tolerating oral intake to maintain hydration --- MET  Nurse to notify provider when observation goals have been met and patient is ready for discharge

## 2020-09-23 NOTE — H&P
"[facility], [facility] Family Medicine History and Physical        Date of Admission:  9/22/2020  Date of Service: 9/22/2020         HPI      Chief Complaint   \"Weak\"    History is obtained from the patient and her daughter, Lavinia Garcia. Patient's primary language is Greenlandic, however, she is [place]qu[place] and unable to utilize iPad . She does speak some English and her daughter helped interpret as well.     History of Present Illness   Diana is a 89 year old female with a history of HTN, HLD, CKD3,  who is admitted with DALILA.     Patient has recently been dealing with left second toe ulcer and osteomyelitis of the head of the proximal phalanx for which she is completing a prolonged course of antibiotics (scheduled to complete 6 week course on 10/07). She is not a candidate for biopsy or amputation 2/2 predicted poor healing with low ABIs. She was initially on doxy+Flagyl, however, was switched to doxy+Levaquin due to diarrhea and DALILA (Cr up to 1.5 from baseline 1.1). She was seen in primary clinic today for follow up of her toe where she also complained of dizziness on standing. Labs showed worsening DALILA with Cr 1.9 in the setting of low BPs at home and poor PO intake.    Patient and her daughter tell me she has had increased muscle weakness over the last few months, which they attribute to her inability to ambulate due to her toe pain. She describes difficulty voiding due to feeling \"too weak to push the urine out\". She sits on the toilet for 20+ minutes and has had urinary incontinence upon standing. No dysuria or hematuria. Reported flank pain to the ER physician, tells me she is feeling better now. No fevers/chills. Has poor PO intake at baseline which daughter states is not new, describing the patient as a \"very picky eater\". Unclear how much she drinks, often has several cups of tea, almond milk, water, etc in her room at her assisted living facility but daughter does not " believe she actually drinks much.     [facility]   24 hour nurse line: #[phone]    BP record (copy in media tab):   9/9 146/60  9/13 125/52 wt 84.2 lbs  9/17 100/66 wt 84.4 lbs  9/19 113/91 wt 83 lbs  9/21 114/66 wt 80 lbs    ED course:  Vitals: 70s-80s bpm, 111/78 mmHg, 97.3 F, 100% on RA  Labs: Cr 1.97, BUN 43, CRP <2.9, lactic 1.3, WBC 6.1, hgb 11.1, UA normal, INR 1.34  Imaging: POCUS renal ultrasound showed no evidence of hydronephrosis or stones. CT A/P showed non-obstructing renal stones, diverticulosis without acute diverticulitis.   Interventions: 1L NS bolus         History:   Review of Systems   The 10 point Review of Systems is negative other than noted in the HPI or here.     Past Medical History    I have reviewed this patient's medical history and updated it with pertinent information if needed.   Past Medical History:   Diagnosis Date     CKD (chronic kidney disease) stage 3, GFR 30-59 ml/min (H)      Gastroesophageal reflux disease      Hypertension       Past Surgical History   I have reviewed this patient's surgical history and updated it with pertinent information if needed.  Past Surgical History:   Procedure Laterality Date     CATARACT IOL, RT/LT       REPAIR PTOSIS       Social History   Social History     Tobacco Use     Smoking status: Never Smoker     Smokeless tobacco: Never Used   Substance Use Topics     Alcohol use: Not Currently     Drug use: Not Currently       Family History   I have reviewed this patient's family history and updated it with pertinent information if needed.   Family History   Problem Relation Age of Onset     Glaucoma No family hx of      Macular Degeneration No family hx of        Prior to Admission Medications    Current Outpatient Medications   Medication Instructions     acetaminophen (TYLENOL) 1,000 mg, Oral, 3 TIMES DAILY     amLODIPine (NORVASC) 10 mg, Oral, EVERY EVENING     ASPIRIN NOT PRESCRIBED, INTENTIONAL, Does not  "apply, CONTINUOUS PRN, Antiplatelet medication not prescribed intentionally due to Intolerance and uncertain      cholecalciferol (VITAMIN D3) 125 mcg (5000 units) capsule TAKE 1 CAPSULE BY MOUTH EVERY MONDAY AND THURSDAY     diclofenac (VOLTAREN) 2 g, Transdermal, 4 TIMES DAILY PRN, Apply to knees, hips, and shoulders     doxycycline hyclate (VIBRA-TABS) 100 mg, Oral, 2 TIMES DAILY     famotidine (PEPCID) 20 mg, Oral, DAILY PRN     furosemide (LASIX) 20 mg, Oral, EVERY OTHER DAY     hydrochlorothiazide (HYDRODIURIL) 25 mg, Oral, DAILY     HYPROMELLOSE-DEXTRAN 0.3-0.1% opthalmic solution INSTILL TWO DROPS INTO BOTH EYES EVERY 6 HOURS IF NEEDED FOR DRY EYES.     LANsoprazole (PREVACID) 30 mg, Oral, DAILY     levofloxacin (LEVAQUIN) 750 mg, Oral, EVERY OTHER DAY     Lidocaine (LIDOCARE) 4 % Patch 1 patch, Transdermal, EVERY 24 HOURS, To prevent lidocaine toxicity, patient should be patch free for 12 hrs daily.     lidocaine (LMX4) 4 % external cream Topical, ONCE PRN     loratadine (CLARITIN) 10 mg, Oral, DAILY     losartan (COZAAR) 50 mg, Oral, DAILY     Multiple Vitamins-Minerals (PRESERVISION AREDS 2) CAPS 1 capsule, Oral, 2 TIMES DAILY     Nutritional Supplements (ENSURE ORIGINAL) LIQD 1 Bottle, Oral, 3 TIMES DAILY WITH MEALS     oxyCODONE (ROXICODONE) 2.5 mg, Oral, 3 TIMES DAILY PRN     polyethylene glycol (MIRALAX/GLYCOLAX) powder 1 capful, Oral, DAILY     polyethylene glycol-propylene glycol (SYSTANE ULTRA) 0.4-0.3 % SOLN ophthalmic solution 1 drop, Both Eyes, 4 TIMES DAILY     potassium chloride ER (KLOR-CON M) 10 MEQ CR tablet 20 mEq, Oral, DAILY     SALONPAS GEL-PATCH HOT 0.025-1.25 % PTCH APPLY 1 PATCH TO CLEAN DRY INTACT SKIN ONCE DAILY.LEAVE ON FOR UP TO 12hrs/24hrs     sertraline (ZOLOFT) 50 mg, Oral, DAILY     SM ACID REDUCER 10 MG tablet TAKE 2 TABS (20MG) BY MOUTH ONCE DAILY     Wound Dressings (HYDROFERA BLUE 6\"X6\") PADS 1 patch, Apply externally, DAILY       Allergies   No Known Allergies        " Physical Exam      Vital Signs: Temp: 99.1  F (37.3  C) Temp src: Oral BP: (!) 154/75 Pulse: 69   Resp: 16 SpO2: 92 % O2 Device: None (Room air)    Weight: last weight from [facility] records: 80 lbs on 9/21    Physical Exam  Constitutional:       General: She is not in acute distress.     Appearance: She is not diaphoretic.      Comments: Thin, frail appearing elderly woman   HENT:      Head: Atraumatic.   Eyes:      Extraocular Movements: Extraocular movements intact.   Cardiovascular:      Rate and Rhythm: Normal rate and regular rhythm.      Pulses: Normal pulses.   Pulmonary:      Effort: Pulmonary effort is normal. No respiratory distress.      Breath sounds: No stridor. No wheezing or rales.   Abdominal:      General: There is no distension.      Palpations: Abdomen is soft.      Tenderness: There is no abdominal tenderness.   Musculoskeletal:         General: No tenderness.      Right lower leg: No edema.      Left lower leg: No edema.   Skin:     Findings: No erythema.      Comments: Feet are cool to touch. She has a bandage wrapped around her left 2nd toe which was not removed. No erythema or tenderness of the toe.   Neurological:      General: No focal deficit present.   Psychiatric:         Mood and Affect: Mood normal.       Assessment & Plan      Diana is a 89 year old female admitted on 9/22/2020. She has a history of HTN, HLD, CKD3 and recent issues with osteomyelitis of her left 2nd toe who is admitted with DALILA.     # DALILA on CKD3  # Borderline hypotension  # Decreased PO intake  Creatinine on admission 1.97 from baseline 1.1. Suspect prerenal with BUN to creatinine 22 and urine specific gravity 1.013 c/w mild dehydration. She has had decreased oral intake for some time and has had low blood pressures recorded at her assisted living facility. CT A/P in the ER was negative for obstructing stone. UA does not show evidence of infection. She received a 1L NS bolus in the ER and will continue IVF  rehydration overnight.   - Gentle rehydration LR @100 mL/hr  - hold nephrotoxic medications  - Hold antihypertensives     # Osteomyelitis  # Generalized weakness   Followed closely by Infectious Disease and previously Podiatry for left second toe ulcer and osteomyelitis of the head of the proximal phalanx. She is scheduled to complete her 6 week course of antibiotics (was on doxy+Flagyl, switched to doxy+Levaquin due to diarrhea) on 10/07/20. Not a candidate for amputation due to low ABIs (R 0.66, L 0.59) predicting poor healing. Most recent imaging of the left foot XR 7/20/20 showing progression of osteo. She is afebrile with normal inflammatory markers, no leukocytosis.   - PTA doxycycline 100 mg BID + levofloxacin 750 mg every other day  - Called [facility] multiple times this evening but staff member was unable to determine the last time patient received a dose of levofloxacin, will ask day team to call again in the AM  - Nutrition supplement ordered (Boost Plus between meals)  - Pain control:   - Tylenol 1,000 mg TID   - PTA lidocaine patches q24h   - PTA tramadol 25 mg at bedtime prn   - If patient remains hospitalized and is switched to inpatient status, will plan PT/OT consult for generalized weakness/deconditioning    # Hypertension  - holding PTA:   - hydrochlorothiazide 25 mg daily   - losartan 50 mg daily   - amlodipine 10 mg every evening  - monitor BP    # Lower extremity edema, chronic  She has been on Lasix since 3/2020 for bilateral lower extremity edema. Compression stockings have been difficult to wear due to toe pain. She has not had an echo since 2006 (EF 65% at that time), repeat was ordered earlier this year given new lower extremity edema but does not appear to have been completed. Lasix has been held since 9/10 without worsening edema.  - hold PTA Lasix 20 mg daily in the setting of DALILA    # GERD  - PTA famotidine 20 mg  - PTA lansoprazole 30 mg daily     # Iron deficiency anemia,  unspecific, stable  Per PCP notes, chronic issue for her. Baseline hgb appears to be around 10. She does have a history of GERD with normal EGD in 2007. She has never had a colonoscopy and has not completed FITT testing.    # Pain Assessment:  Current Pain Score 9/4/2020   Pain score (0-10) 8   - Jatin is experiencing pain due to osteomyelitis. Pain management was discussed with Jatin and her daughter and the plan was created in a collaborative fashion.  Jatin's response to the current recommendations: engaged  - Please see the plan for pain management as documented above    Diet: Regular Diet Adult  Snacks/Supplements Pediatric: Boost Plus; Between Meals  Fluids: LR @ 100 mL/hr  DVT Prophylaxis: Not indicated (Pauda 1 for age >70)  Code Status: DNR / DNI    Disposition Plan   Expected discharge: Tomorrow; recommended to prior living arrangement once DALILA improving and BP stable. Dispo:       Entered: Crystal Domingo 09/22/2020, 10:48 PM   Information in the above section will display in the discharge planner report.    Discussed with faculty but not examined by faculty.    Crystal Domingo  [facility] Inpatient [facility]  [facility]   Pager: [phone]  Please see sticky note for cross cover information      Results:     Data   Recent Labs   Lab 09/22/20  1708 09/22/20  1630 09/22/20  1336 09/18/20  1410   WBC  --  6.1  --   --    HGB  --  11.1*  --   --    MCV  --  91  --   --    PLT  --  183  --   --    INR 1.34*  --   --   --    NA  --  135 138.3 134.3   POTASSIUM  --  4.0 4.1 4.5   CHLORIDE  --  101 98.8 95.0*   CO2  --  28 26.9 26.3   BUN  --  43* 45.0* 39.2*   CR  --  1.97* 1.9* 1.6*   ANIONGAP  --  7  --   --    MK  --  8.6 9.0 9.2   GLC  --  90 112.1* 102.5*   ALBUMIN  --  3.1*  --   --    PROTTOTAL  --  6.6*  --  6.4*   BILITOTAL  --  1.1  --  1.0   ALKPHOS  --  77  --  64.0   ALT  --  17  --  10.5   AST  --  35  --  15.0   LIPASE  --  231  --   --      Recent Results  (from the past 24 hour(s))   POC US RETROPERITONEAL LIMITED    Impression    Limited Bedside Renal Ultrasound, performed and interpreted by me.    Indication: Flank pain  Images of the the right and left kidney were acquired in short and long axis, evaluating for hydronephrosis. A short and long axis of the bladder was evaluated for bladder stones. Image quality was satisfactory..     Findings:  No evidence of hydronephrosis in bilateral kidneys.    No urinary bladder stones seen.         IMPRESSION: No evidence of hydronephrosis or stones.     CT Abdomen Pelvis w/o Contrast    Narrative    EXAMINATION: CT ABDOMEN PELVIS W/O CONTRAST, 9/22/2020 6:24 PM    TECHNIQUE:  Helical CT images from the lung bases through the  symphysis pubis were obtained without contrast.  Coronal reformatted  images were generated at a workstation for further assessment.    COMPARISON: Abdomen and pelvis CT 2/13/2016    HISTORY: Flank pain, stone disease suspected - left    FINDINGS:    Abdomen and pelvis:   Liver: No suspicious liver lesions. No intrahepatic or extrahepatic  biliary ductal dilatation.  Gallbladder: Cholecystectomy.  Spleen: Normal size.  Pancreas: Diffusely atrophic appearance to the pancreas. The  pancreatic duct is not dilated.  Adrenal glands: No adrenal nodules.  Urinary system: No kidney masses. Areas of thickening in the left mid  to superior kidney demonstrated. 4 mm calcification within the  superior pole of the left kidney may be in a calyx with cortical  scarring in this region versus parenchymal. 3 mm nonobstructing stone  within the interpolar region of the right kidney. No hydronephrosis,  hydroureter, or obstructing renal stones. 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 Urinary bladder is unremarkable.  Reproductive organs: Peripherally calcified structures adjacent to the  uterus and adnexal regions stable from prior study.  Vascular  calcifications in the uterus also present.  Bowel: Partial visualization of large hiatal hernia. Diverticulosis  without evidence for acute diverticulitis. Duodenal diverticulum. Mild  diffuse haziness of the mesentery is not significantly changed  compared to prior. No abnormally dilated loops of large or small  bowel. No abnormal bowel wall thickening. The appendix is  unremarkable.  Lymph nodes: No retroperitoneal, mesenteric, or pelvic  lymphadenopathy.  Fluid: No free fluid within the abdomen.  Vessels: No infrarenal aortic aneurysm. Extensive atherosclerotic  calcifications of the abdominal aorta and branch vessels. Ectatic  right common iliac artery measuring 1.6 cm (series 5, image 165)  Multiple pelvic phleboliths.    Lung bases: No consolidation or pleural effusion. Fibrosis/scarring at  the left lung base with scattered calcified granulomas within the left  lung base.    Bones and soft tissues: No suspicious osseous lesions. Osteopenic  appearance to the bones. Superior endplate compression deformity of T1  with Schmorl node deformity of the superior endplate. Grade 1  retrolisthesis of L1 on L2, L2 on L3, L3 and L4. Grade 1  anterolisthesis of L5 on S1. Advanced degenerative changes throughout  the visualized spine. Multiple calcifications within the gluteal soft  tissues likely injection granulomas.      Impression    IMPRESSION:   1.  No obstructing renal stone. 4 mm calcification within the superior  pole of the left kidney may be a nonobstructing stone versus  parenchymal dystrophic calcification. Areas of scarring and cortical  thinning in the mid to superior pole of the left kidney.  2.  Nonobstructing 3 mm renal calculus within the right kidney.  3.  Diverticulosis without evidence for acute diverticulitis.  4.  Large hiatal hernia.  5.  Duodenal diverticulum.  6.  Extensive atherosclerotic calcifications of the abdominal aorta  and its branch vessels with unchanged ectasia of the right  common  iliac artery measuring up to 1.6 cm.    I have personally reviewed the examination and initial interpretation  and I agree with the findings.    SAMMY MARTINEZ MD

## 2020-09-23 NOTE — PROGRESS NOTES
Admitted/transferred from:   2 RN full   skin assessment completed by Stephania Kapoor, RN and Ilya Guevara, RN.  Skin assessment finding: issues found mild bruising on L arm from previous lab draw site. Blanchable redness found on sacrum.  Med patches below bilateral knees. L toe ulcer dressing CDI, UTV.  Interventions/actions: skin interventions included mepilex application and education on importance of frequent weight shifting and ambulation when possible. Pt promptly turned to one side and stated understanding.     Will continue to monitor.

## 2020-09-23 NOTE — PLAN OF CARE
Vital signs:  Temp: 99.1  F (37.3  C) Temp src: Oral BP: (!) 154/75 Pulse: 69   Resp: 16 SpO2: 92 % O2 Device: None (Room air)        Activity: Assist of 1 walker, gaitbelt.   Neuros: WDL ex forgetful  Cardiac: WDL, VSS  Respiratory: WDL, sating well on RA  GI/: Voiding spontaneously.   Diet: Regular, poor appetite.   Lines: PIV running LR at 100 mL/hr  Skin: wound/osteomyelitis on R 2nd toe, wrapped with bandage, CDI  Pain/nausea: Denies   Plan:   Continue POC  Observation goals:   -diagnostic tests and consults completed and resulted- in progress  -vital signs normal or at patient baseline- met  -tolerating oral intake to maintain hydration- not met

## 2020-09-23 NOTE — PHARMACY-ADMISSION MEDICATION HISTORY
Admission medication history interview status for the 9/22/2020 admission is complete. See Epic admission navigator for allergy information, pharmacy, prior to admission medications and immunization status.     Medication history interview sources:  Medication profile list from [facility] facility     Changes made to [facility] medication list (reason)  Added: Ginko supplement, lutein supplement, capsaicin cream, ciclopirox solution, triamcinolone cream,   Deleted: cholecalciferol, AREDS preservision multivitamins, Salonpas patch, SM acid reducer (duplicate)  Changed: miralax scheduled-->prn, loratadine 10mg in AM-->10mg in PM, diclofenac gel q6 hours prn--->scheduled twice daily, famotidine daily prn-->daily scheduled, lidocaine cream once prn-->twice daily scheduled,     Additional medication history information (including reliability of information, actions taken by pharmacist): MAR was not provided by [facility] living facility, so last doses were unable to be confirmed.  Of note, Furosemide 20mg every other day and scheduled potassium 20 meq daily are currently on hold.  Strengths for lutein supplement and ginkgo supplement were not provided on medication profile list.  Levofloxacin was started 9/11/20 with a planned duration of 6 weeks for osteomyelitis. Doxycycline was started 8/27/20 with a planned duration of 6 weeks for osteomyelitis. Of note, metronidazole had been started on 8/27/20 but was stopped due to vomiting.  Patient received a 7 day supply of clindamycin on 7/21/20 for a L 2nd toe infection.       Prior to Admission medications    Medication Sig Last Dose Taking? Auth Provider   acetaminophen (TYLENOL) 500 MG tablet Take 2 tablets (1,000 mg) by mouth 3 times daily Unknown at Unknown time  Wero Gerber,    amLODIPine (NORVASC) 10 MG tablet Take 1 tablet (10 mg) by mouth every evening Unknown at Unknown time  Mark Cummings MD   ASPIRIN NOT PRESCRIBED, INTENTIONAL, continuous prn for  other Antiplatelet medication not prescribed intentionally due to .   Reported, Patient   capsaicin (ZOSTRIX) 0.025 % external cream Apply topically 3 times daily Unknown at Unknown time  Unknown, Entered By History   ciclopirox (PENLAC) 8 % external solution Apply 1 Application topically At Bedtime Apply to toes Unknown at Unknown time  Unknown, Entered By History   diclofenac (VOLTAREN) 1 % topical gel Place 2 g onto the skin 4 times daily as needed for moderate pain Apply to knees, hips, and shoulders  Patient taking differently: Place 2 g onto the skin 2 times daily Apply to knees, hips, and shoulders Unknown at Unknown time  Mark Cummings MD   doxycycline hyclate (VIBRA-TABS) 100 MG tablet Take 1 tablet (100 mg) by mouth 2 times daily  Patient taking differently: Take 100 mg by mouth 2 times daily 8AM and 7PM, started 8/27/20 Unknown at Unknown time  Shan Neumann MD   famotidine (PEPCID) 20 MG tablet Take 20 mg by mouth every morning  Unknown at Unknown time  Marilou Rudolph MD   furosemide (LASIX) 20 MG tablet Take 1 tablet (20 mg) by mouth every other day  Patient not taking: Reported on 9/16/2020 Unknown at Unknown time  Mario Alberto Ramsay MD   Ginkgo Biloba (GINKOBA PO) Take 1 capsule by mouth every evening Unknown at Unknown time  Unknown, Entered By History   hydrochlorothiazide (HYDRODIURIL) 25 MG tablet Take 1 tablet (25 mg) by mouth daily  Patient taking differently: Take 25 mg by mouth daily In the afternoon Unknown at Unknown time  Mark Cummings MD   HYPROMELLOSE-DEXTRAN 0.3-0.1% opthalmic solution INSTILL TWO DROPS INTO BOTH EYES EVERY 6 HOURS IF NEEDED FOR DRY EYES. Unknown at Unknown time  Mark Cummings MD   LANsoprazole (PREVACID) 30 MG DR capsule Take 1 capsule (30 mg) by mouth daily  Patient taking differently: Take 30 mg by mouth daily Before dinner Unknown at Unknown time  Mark Cummings MD   levofloxacin (LEVAQUIN) 750 MG tablet TAKE 1 TABLET (750 MG) BY MOUTH EVERY OTHER  DAY  Patient taking differently: Take 750 mg by mouth every other day Started 9/11/20 Unknown at Unknown time  Tone Lawler MD   Lidocaine (LIDOCARE) 4 % Patch Place 1 patch onto the skin every 24 hours To prevent lidocaine toxicity, patient should be patch free for 12 hrs daily. Unknown at Unknown time  Mark Cummings MD   lidocaine (LMX4) 4 % external cream Apply topically once as needed for mild pain  Patient taking differently: Apply topically every 12 hours  Unknown at Unknown time  Wero Gerber,    loratadine (CLARITIN) 10 MG tablet Take 1 tablet (10 mg) by mouth daily  Patient taking differently: Take 10 mg by mouth every evening  Unknown at Unknown time  Mark Cummings MD   losartan (COZAAR) 50 MG tablet Take 1 tablet (50 mg) by mouth daily Unknown at Unknown time  Mark Cummings MD   LUTEIN PO Take 1 capsule by mouth every morning Unknown at Unknown time  Unknown, Entered By History   Nutritional Supplements (ENSURE ORIGINAL) LIQD Take 1 Bottle by mouth 3 times daily (with meals) Unknown at Unknown time  Esme Torre MD   oxyCODONE (ROXICODONE) 5 MG tablet Take 0.5 tablets (2.5 mg) by mouth 3 times daily as needed for moderate to severe pain (Try to prescribe BID, but okay to do TID if need for breakthrough .)  Patient not taking: Reported on 9/16/2020 Unknown at Unknown time  Alfredo Traore MD   polyethylene glycol (MIRALAX/GLYCOLAX) powder Take 17 g (1 capful) by mouth daily  Patient taking differently: Take 1 capful by mouth daily as needed  Unknown at Unknown time  Mark Cummings MD   polyethylene glycol-propylene glycol (SYSTANE ULTRA) 0.4-0.3 % SOLN ophthalmic solution Place 1 drop into both eyes 4 times daily Unknown at Unknown time  Jan Allen, MUNIRA   potassium chloride ER (KLOR-CON M) 10 MEQ CR tablet Take 2 tablets (20 mEq) by mouth daily  Patient not taking: Reported on 9/16/2020 Unknown at Unknown time  Alfredo Traore MD   sertraline (ZOLOFT) 25 MG tablet  "Take 2 tablets (50 mg) by mouth daily Unknown at Unknown time  Mark Cummings MD   traMADol (ULTRAM) 50 MG tablet Take 50 mg by mouth 2 times daily as needed for pain Unknown at Unknown time  Unknown, Entered By History   triamcinolone (KENALOG) 0.1 % external cream Apply topically 3 times daily Unknown at Unknown time  Unknown, Entered By History   Wound Dressings (HYDROFERA BLUE 6\"X6\") PADS Externally apply 1 patch topically daily  Patient not taking: Reported on 9/16/2020   Wero Gerber DO         Medication history completed by: Ganesh Jarrell, PharmD, BCPS    "

## 2020-09-23 NOTE — DISCHARGE SUMMARY
Tramadol, amlodipine, lasix, hydrodiuril stopped, verified with provider. LPIV removed and dressing applied. discharge summary reviewed with daughter, picked up patient and  signed discharge papers

## 2020-09-23 NOTE — DISCHARGE SUMMARY
"[facility] Discharge Summary       Date of Admission:  9/22/2020  Date of Discharge:  9/23/2020  3:19 PM  Date of Service: 9/23/2020  Discharging Attending Provider: Dr. Brown   Discharge Team: [facility]    Discharge Diagnoses      Acute kidney injury (H)  Left flank pain  Osteomyelitis of left foot, unspecified type (H)  Osteomyelitis of toe of left foot (H)  Essential hypertension  Chronic bilateral low back pain without sciatica  Chronic pain of both knees  Mild recurrent major depression (H)  Primary osteoarthritis of both knees  CKD (chronic kidney disease) stage 3, GFR 30-59 ml/min  DALILA (acute kidney injury) (H)  Presbycusis of both ears  Weight loss  Non-intractable vomiting without nausea    Follow-ups Needed After Discharge   Follow-up w/ PCP w/in one week for hospital follow-up   - Weekly BMP to monitor renal function (will get some through home care; also get one while in clinic)   - Reassess blood pressure  - Consider nutrition referral  - Work through advanced care planning docs w/ family (see my prior clinic notes--it's complete, we just don't have copy of it yet)  - Try to arrange w/ [facility] Care Coordinator to see if home visits possible   - Per discharge instructions: \"At follow-up, can discuss   - Pain: Is oxycodone still working?   - Mood: Should we slowly discontinue sertraline (zoloft)?   - Appetite: How are things going now?   - Advanced care planning: What's most important to you, and how can we help you achieve that?\"  -     Hospital Course   Diana is a 89 year old female with a history of HTN, HLD, CKD3, and osteomyelitis of left toe who was admitted for pre-renal DALILA d/t poor PO intake. Given fluids overnight w/ subsequent improvement in renal function, but not resolution (from Cr 1.97 to 1.34). Thorough workup was otherwise unrevealing in terms of etiology of DALILA.  The following problems were addressed " during her hospitalization:    # DALILA on CKD III, prerenal; (CT AP in ER negative for obstructing stone. UA not infectious).   # Borderline hypotension  # Decreased PO intake   # Weight loss (10 lb weight loss w/in last month per NH records seen in Media section)  History significant for these details: poor appetite with nausea and vomiting while taking antibiotics--not w/ every meal, but occasionally. Decreased appetite overall. Very picky eater. Poor dentition (edentulous bottom row d/t covid-related delays in denture fittings). Creatinine on admission 1.97 from baseline 1.1, improved to 1.34 s/p gentle fluids.   - Need ongoing discussions about appetite and goals of care  - Ensure prescribed w/ each meal by Dr. Young on 9/22  - Holding home BP meds (stopped hydrochlorothiazide, losartan, and amlodipine)   - Strict instructions to NH to resume losartan if BP >150/90; and to call clinic to schedule appt if BP >150/90 two days in a row  - Measure BP daily at NH      # Osteomyelitis  # Generalized weakness   Followed closely by Infectious Disease and previously Podiatry for left second toe ulcer and osteomyelitis of the head of the proximal phalanx. She is scheduled to complete her 6 week course of antibiotics (was on doxy+Flagyl, switched to doxy+Levaquin due to diarrhea) on 10/07/20. Not a candidate for amputation due to low ABIs (R 0.66, L 0.59) predicting poor healing. Most recent imaging of the left foot XR 7/20/20 showing progression of osteo. She is afebrile with normal inflammatory markers, no leukocytosis.   - On discharge, continue meds as prescribed by ID: doxycycline 100 mg BID + levofloxacin 750 mg every other day  - Pain control:              - Tylenol 1,000 mg TID              - PTA lidocaine patches q24h  - Oxycodone 2.5 mg TID PRN (discussed w/ daughter and confirmed this and extra strength tylenol are helping a lot w/ pain and make it manageable; daughter also notes that tramadol makes Diana  dizzy)       # Lower extremity edema, chronic  She has been on Lasix since 3/2020 for bilateral lower extremity edema. Compression stockings have been difficult to wear due to toe pain. She has not had an echo since 2006 (EF 65% at that time), repeat was ordered earlier this year given new lower extremity edema but does not appear to have been completed. Lasix has been held since 9/10 without worsening edema.  - hold PTA Lasix 20 mg daily in the setting of DALILA; can resume in clinic if LE edema returns      # GERD  - PTA famotidine 20 mg  - PTA lansoprazole 30 mg daily      # Iron deficiency anemia, unclear etiology stable  Per PCP notes, chronic issue for her. Baseline hgb appears to be around 10. She does have a history of GERD with normal EGD in 2007. She has never had a colonoscopy and has not completed FITT testing. May be related solely to poor oral intake.     # Hard of hearing, bilateral, presbycusis: Important to have in person  available.     # Depression:  On sertraline, which daughter states isn't really helping much. This can sometimes contribute to hypotension and/or to nausea/stomach upset. Could consider tapering in future. Considered mirtazapine for mood and possible appetite benefits, but reluctant d/t side effect of orthostatic hypotension.     # Discharge Pain Plan: (see osteomyelitis above)    Consultations This Hospital Stay    None    Code Status   DNR / DNI       The patient was discussed with Dr. [facility]  [facility]   Pager: [phone]  ______________________________________________________________________    Decreased appetite, 10 lb weight loss. No fever, chills, diaphoresis.   Hearing loss, otherwise no HENT problems.   Eyes w/o dryness or itching.   Respiratory w/o cough, shortness of breath, or wheezing.   Cardio w/o leg swelling (currently), chest pain, or palpitations.   GI: occasional nausea and  "vomiting. No diarrhea. No melena or hematochezia.   : No difficulty urinating. No vaginal pain.   Musc: Diffuse arthralgias, alex back and bilateral knees-chronic and stable.   Neuro: no dizziness or lightheadedness. Positive for weakness.   Psych: no hallucinations or delirium. Mental status appropriate.     Physical Exam   Vital Signs: Temp: 96.2  F (35.7  C) Temp src: Oral BP: (!) 143/72 Pulse: 72   Resp: 16 SpO2: 100 % O2 Device: None (Room air)    Weight: 87 lbs 15.42 oz    General appearance: well-developed, thin, interactive, no acute distress, pleasant  Lungs: breathing comfortably on room air without increased effort, lungs clear  Heart: RRR w/ soft II/IV murmur in anterior precordium  MSK: thin extremities  Left Foot: wrapped/bandaged right now-- did not expose lesion of left foot, but surrounding skin is warm and dry w/o erythema or edema  Neurologic: alert and oriented to person, place, and time; speaks mostly [place], but some English, very hard of hearing  Skin: thin, some bruising, no obvious lesions  Psych: pleasant mood \"good,\" affect normal, insight and judgement fair   GI: soft, thin, nontender, nondistended abdomen w/ normal bowel sounds     Significant Results and Procedures   Results for orders placed or performed during the hospital encounter of 09/22/20 (from the past 48 hour(s))   CBC with platelets differential   Result Value Ref Range    WBC 6.1 4.0 - 11.0 10e9/L    RBC Count 3.89 3.8 - 5.2 10e12/L    Hemoglobin 11.1 (L) 11.7 - 15.7 g/dL    Hematocrit 35.3 35.0 - 47.0 %    MCV 91 78 - 100 fl    MCH 28.5 26.5 - 33.0 pg    MCHC 31.4 (L) 31.5 - 36.5 g/dL    RDW 16.7 (H) 10.0 - 15.0 %    Platelet Count 183 150 - 450 10e9/L    Diff Method Automated Method     % Neutrophils 66.5 %    % Lymphocytes 26.0 %    % Monocytes 6.7 %    % Eosinophils 0.2 %    % Basophils 0.3 %    % Immature Granulocytes 0.3 %    Nucleated RBCs 0 0 /100    Absolute Neutrophil 4.1 1.6 - 8.3 10e9/L    Absolute Lymphocytes " 1.6 0.8 - 5.3 10e9/L    Absolute Monocytes 0.4 0.0 - 1.3 10e9/L    Absolute Eosinophils 0.0 0.0 - 0.7 10e9/L    Absolute Basophils 0.0 0.0 - 0.2 10e9/L    Abs Immature Granulocytes 0.0 0 - 0.4 10e9/L    Absolute Nucleated RBC 0.0    Comprehensive metabolic panel   Result Value Ref Range    Sodium 135 133 - 144 mmol/L    Potassium 4.0 3.4 - 5.3 mmol/L    Chloride 101 94 - 109 mmol/L    Carbon Dioxide 28 20 - 32 mmol/L    Anion Gap 7 3 - 14 mmol/L    Glucose 90 70 - 99 mg/dL    Urea Nitrogen 43 (H) 7 - 30 mg/dL    Creatinine 1.97 (H) 0.52 - 1.04 mg/dL    GFR Estimate 21 (L) >60 mL/min/[1.73_m2]    GFR Estimate If Black 25 (L) >60 mL/min/[1.73_m2]    Calcium 8.6 8.5 - 10.1 mg/dL    Bilirubin Total 1.1 0.2 - 1.3 mg/dL    Albumin 3.1 (L) 3.4 - 5.0 g/dL    Protein Total 6.6 (L) 6.8 - 8.8 g/dL    Alkaline Phosphatase 77 40 - 150 U/L    ALT 17 0 - 50 U/L    AST 35 0 - 45 U/L   Lipase   Result Value Ref Range    Lipase 231 73 - 393 U/L   CRP inflammation   Result Value Ref Range    CRP Inflammation <2.9 0.0 - 8.0 mg/L   Erythrocyte sedimentation rate auto   Result Value Ref Range    Sed Rate 5 0 - 30 mm/h   Lactic acid whole blood   Result Value Ref Range    Lactic Acid 1.3 0.7 - 2.0 mmol/L   INR   Result Value Ref Range    INR 1.34 (H) 0.86 - 1.14   POC US RETROPERITONEAL LIMITED    Impression    Limited Bedside Renal Ultrasound, performed and interpreted by me.    Indication: Flank pain  Images of the the right and left kidney were acquired in short and long axis, evaluating for hydronephrosis. A short and long axis of the bladder was evaluated for bladder stones. Image quality was satisfactory..     Findings:  No evidence of hydronephrosis in bilateral kidneys.    No urinary bladder stones seen.         IMPRESSION: No evidence of hydronephrosis or stones.     CT Abdomen Pelvis w/o Contrast    Narrative    EXAMINATION: CT ABDOMEN PELVIS W/O CONTRAST, 9/22/2020 6:24 PM    TECHNIQUE:  Helical CT images from the lung bases  through the  symphysis pubis were obtained without contrast.  Coronal reformatted  images were generated at a workstation for further assessment.    COMPARISON: Abdomen and pelvis CT 2/13/2016    HISTORY: Flank pain, stone disease suspected - left    FINDINGS:    Abdomen and pelvis:   Liver: No suspicious liver lesions. No intrahepatic or extrahepatic  biliary ductal dilatation.  Gallbladder: Cholecystectomy.  Spleen: Normal size.  Pancreas: Diffusely atrophic appearance to the pancreas. The  pancreatic duct is not dilated.  Adrenal glands: No adrenal nodules.  Urinary system: No kidney masses. Areas of thickening in the left mid  to superior kidney demonstrated. 4 mm calcification within the  superior pole of the left kidney may be in a calyx with cortical  scarring in this region versus parenchymal. 3 mm nonobstructing stone  within the interpolar region of the right kidney. No hydronephrosis,  hydroureter, or obstructing renal stones. 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 Urinary bladder is unremarkable.  Reproductive organs: Peripherally calcified structures adjacent to the  uterus and adnexal regions stable from prior study. Vascular  calcifications in the uterus also present.  Bowel: Partial visualization of large hiatal hernia. Diverticulosis  without evidence for acute diverticulitis. Duodenal diverticulum. Mild  diffuse haziness of the mesentery is not significantly changed  compared to prior. No abnormally dilated loops of large or small  bowel. No abnormal bowel wall thickening. The appendix is  unremarkable.  Lymph nodes: No retroperitoneal, mesenteric, or pelvic  lymphadenopathy.  Fluid: No free fluid within the abdomen.  Vessels: No infrarenal aortic aneurysm. Extensive atherosclerotic  calcifications of the abdominal aorta and branch vessels. Ectatic  right common iliac artery measuring 1.6 cm (series 5, image  165)  Multiple pelvic phleboliths.    Lung bases: No consolidation or pleural effusion. Fibrosis/scarring at  the left lung base with scattered calcified granulomas within the left  lung base.    Bones and soft tissues: No suspicious osseous lesions. Osteopenic  appearance to the bones. Superior endplate compression deformity of T1  with Schmorl node deformity of the superior endplate. Grade 1  retrolisthesis of L1 on L2, L2 on L3, L3 and L4. Grade 1  anterolisthesis of L5 on S1. Advanced degenerative changes throughout  the visualized spine. Multiple calcifications within the gluteal soft  tissues likely injection granulomas.      Impression    IMPRESSION:   1.  No obstructing renal stone. 4 mm calcification within the superior  pole of the left kidney may be a nonobstructing stone versus  parenchymal dystrophic calcification. Areas of scarring and cortical  thinning in the mid to superior pole of the left kidney.  2.  Nonobstructing 3 mm renal calculus within the right kidney.  3.  Diverticulosis without evidence for acute diverticulitis.  4.  Large hiatal hernia.  5.  Duodenal diverticulum.  6.  Extensive atherosclerotic calcifications of the abdominal aorta  and its branch vessels with unchanged ectasia of the right common  iliac artery measuring up to 1.6 cm.    I have personally reviewed the examination and initial interpretation  and I agree with the findings.    SAMMY MARTINEZ MD   UA reflex to Microscopic and Culture    Specimen: Urine clean catch; Midstream Urine   Result Value Ref Range    Color Urine Yellow     Appearance Urine Clear     Glucose Urine Negative NEG^Negative mg/dL    Bilirubin Urine Negative NEG^Negative    Ketones Urine Negative NEG^Negative mg/dL    Specific Gravity Urine 1.013 1.003 - 1.035    Blood Urine Negative NEG^Negative    pH Urine 5.5 5.0 - 7.0 pH    Protein Albumin Urine Negative NEG^Negative mg/dL    Urobilinogen mg/dL Normal 0.0 - 2.0 mg/dL    Nitrite Urine Negative  NEG^Negative    Leukocyte Esterase Urine Negative NEG^Negative    Source Midstream Urine    Asymptomatic COVID-19 Virus (Coronavirus) by PCR    Specimen: Nasopharyngeal   Result Value Ref Range    COVID-19 Virus PCR to [facility] - Source Swab     COVID-19 Virus PCR to [facility] - Result       Test received-See reflex to IDDL test SARS CoV2 (COVID-19) Virus RT-PCR   SARS-CoV-2 COVID-19 Virus (Coronavirus) RT-PCR Nasopharyngeal    Specimen: Nasopharyngeal   Result Value Ref Range    SARS-CoV-2 Virus Specimen Source Swab     SARS-CoV-2 PCR Result NEGATIVE     SARS-CoV-2 PCR Comment       Testing was performed using the Xpert Xpress SARS-CoV-2 Assay on the Cepheid Gene-Xpert   Instrument Systems. Additional information about this Emergency Use Authorization (EUA)   assay can be found via the Lab Guide.     Basic metabolic panel   Result Value Ref Range    Sodium 137 133 - 144 mmol/L    Potassium 3.8 3.4 - 5.3 mmol/L    Chloride 105 94 - 109 mmol/L    Carbon Dioxide 26 20 - 32 mmol/L    Anion Gap 6 3 - 14 mmol/L    Glucose 76 70 - 99 mg/dL    Urea Nitrogen 35 (H) 7 - 30 mg/dL    Creatinine 1.34 (H) 0.52 - 1.04 mg/dL    GFR Estimate 34 (L) >60 mL/min/[1.73_m2]    GFR Estimate If Black 39 (L) >60 mL/min/[1.73_m2]    Calcium 8.0 (L) 8.5 - 10.1 mg/dL       Primary Care Physician   Jesica Ayala    Discharge Disposition   Discharged to assisted living  Condition at discharge: Satisfactory    Discharge Orders      BLOOD PRESSURE, MEASURED    Please measure blood pressure daily. Give losartan (50mg) if blood pressure >150/90. If >150/90 on two days in a row, then please call [facility] to schedule appointment.     Basic metabolic panel    To be drawn prior to 9/28/2020     Basic metabolic panel    To be drawn after 9/29/2020; ideally on 10/2/2020     Home Care PT Referral for Hospital Discharge      Home Care OT Referral for Hospital Discharge      Home care nursing referral      MD face to face encounter    Documentation of Face  to Face and Certification for Home Health Services    I certify that patient: Jatin Ayala is under my care and that I, or a nurse practitioner or physician's assistant working with me, had a face-to-face encounter that meets the physician face-to-face encounter requirements with this patient on: 9/23/2020.    This encounter with the patient was in whole, or in part, for the following medical condition, which is the primary reason for home health care: failure to thrive, frequent falls.    I certify that, based on my findings, the following services are medically necessary home health services: Nursing, Occupational Therapy and Physical Therapy.    My clinical findings support the need for the above services because: Nurse is needed: To provide assessment and oversight required in the home to assure adherence to the medical plan due to: failure to thrive, Occupational Therapy Services are needed to assess and treat cognitive ability and address ADL safety due to impairment in frequent falls. and Physical Therapy Services are needed to assess and treat the following functional impairments: frequent falls.    Further, I certify that my clinical findings support that this patient is homebound (i.e. absences from home require considerable and taxing effort and are for medical reasons or [facility] services or infrequently or of short duration when for other reasons) because: Requires assistance of another person or specialized equipment to access medical services because patient: Requires supervision of another for safe transfer...    Based on the above findings. I certify that this patient is confined to the home and needs intermittent skilled nursing care, physical therapy and/or speech therapy.  The patient is under my care, and I have initiated the establishment of the plan of care.  This patient will be followed by a physician who will periodically review the plan of care.  Physician/Provider to provide follow up  care: Jesica Ayala    Attending hospital physician (the Medicare certified [facility] provider): Violetta Brown DO  Physician Signature: See electronic signature associated with these discharge orders.  Date: 9/23/2020     Reason for your hospital stay    You were hospitalized for kidney injury, which improved after giving some fluids and holding your blood pressure medications.     Adult [facility] Follow-up and recommended labs and tests    Follow up with primary care provider, Jesica Ayala, within 7 days to evaluate medication change and for hospital follow- up.  The following labs/tests are recommended: CBC, CMP.      At follow-up, can discuss  - Pain: Is oxycodone still working?   - Mood: Should we slowly discontinue sertraline (zoloft)?   - Appetite: How are things going now?   - Advanced care planning: What's most important to you, and how can we help you achieve that?     Appointments on [place] and/or [facility] (with [facility] or [facility] provider or service). Call [phone] if you haven't heard regarding these appointments within 7 days of discharge.     Activity    Activity as tolerated. Ordered home PT and OT to assist w/ safe ambulation and w/ maintaining joint range of motion.     When to contact your care team    Seek medical care if you experience any new or concerning signs or symptoms. Also, please contact [facility] for appointment if blood pressure >150/90 two days in a row.     Discharge Instructions    Follow-directions as listed above. Make sure to check out these sections: follow-up visits and medications.  - Follow-up visit w/ Dr. Ayala within one week, or when able. Dr. Ayala will also reach out to [place]'s Care Coordinator to figure out whether we can do home visits in future.   - Homecare will come to do labs twice within the next couple of weeks. Hopefully once before 9/27; and once within 4-7 days after that.   - Assisted living facility: please check blood pressure daily. Do not give  losartan if blood pressure is less than 150/90.  - Please call to schedule clinic appointment if blood pressure is greater than 150/90 on two days in a row.     Discharge Instructions    Make sure patient gets dose of levofloxacin today 9/23 at assisted living. She did not receive it while in hospital.     No CPR- Do NOT Intubate     Diet    Follow this diet upon discharge: Eat foods that make you feel energized, nourished, and full. Can place nutrition referral during clinic appointment if you think that would be helpful. Also encourage you to try to drink one Ensure with each meal.     Discharge Medications   Discharge Medication List as of 9/23/2020  2:39 PM      CONTINUE these medications which have CHANGED    Details   levofloxacin (LEVAQUIN) 750 MG tablet Take 1 tablet (750 mg) by mouth every other day, HistoricalPlease ensure she gets dose today 9/23. She did not receive it in hospital.      losartan (COZAAR) 50 MG tablet Take 1 tablet (50mg) by mouth daily only if blood pressure is greater than 150/90., Disp-90 tablet,R-3, Historical         CONTINUE these medications which have NOT CHANGED    Details   acetaminophen (TYLENOL) 500 MG tablet Take 2 tablets (1,000 mg) by mouth 3 times daily, Disp-100 tablet,R-3, E-Prescribe      ASPIRIN NOT PRESCRIBED, INTENTIONAL, continuous prn for other Antiplatelet medication not prescribed intentionally due to CHOOSE REASON BEFORE SIGNING!!!:[id number], Historical      capsaicin (ZOSTRIX) 0.025 % external cream Apply topically 3 times daily, Historical      ciclopirox (PENLAC) 8 % external solution Apply 1 Application topically At Bedtime Apply to toesHistorical      diclofenac (VOLTAREN) 1 % topical gel Place 2 g onto the skin 4 times daily as needed for moderate pain Apply to knees, hips, and shoulders, Disp-100 g, R-3, E-Prescribe      doxycycline hyclate (VIBRA-TABS) 100 MG tablet Take 1 tablet (100 mg) by mouth 2 times daily, Disp-84 tablet,R-0, E-Prescribe       famotidine (PEPCID) 20 MG tablet Take 20 mg by mouth every morning , Disp-90 tablet,R-3, Historical      Ginkgo Biloba (GINKOBA PO) Take 1 capsule by mouth every evening, Historical      HYPROMELLOSE-DEXTRAN 0.3-0.1% opthalmic solution INSTILL TWO DROPS INTO BOTH EYES EVERY 6 HOURS IF NEEDED FOR DRY EYES., Disp-30 mL, R-3, KAM, E-Prescribe      LANsoprazole (PREVACID) 30 MG DR capsule Take 1 capsule (30 mg) by mouth daily, Disp-90 capsule, R-3, E-Prescribe      Lidocaine (LIDOCARE) 4 % Patch Place 1 patch onto the skin every 24 hours To prevent lidocaine toxicity, patient should be patch free for 12 hrs daily.Disp-10 patch,[id number]      lidocaine (LMX4) 4 % external cream Apply topically once as needed for mild painDisp-120 g,[id number]      loratadine (CLARITIN) 10 MG tablet Take 1 tablet (10 mg) by mouth daily, Disp-90 tablet, R-3, E-Prescribe      LUTEIN PO Take 1 capsule by mouth every morning, Historical      Nutritional Supplements (ENSURE ORIGINAL) LIQD Take 1 Bottle by mouth 3 times daily (with meals), Disp-237 mL,R-90, E-Prescribe      oxyCODONE (ROXICODONE) 5 MG tablet Take 0.5 tablets (2.5 mg) by mouth 3 times daily as needed for moderate to severe pain (Try to prescribe BID, but okay to do TID if need for breakthrough .), Disp-32 tablet,R-0, Local Print      polyethylene glycol (MIRALAX/GLYCOLAX) powder Take 17 g (1 capful) by mouth daily, Disp-850 g, R-3, E-Prescribe      polyethylene glycol-propylene glycol (SYSTANE ULTRA) 0.4-0.3 % SOLN ophthalmic solution Place 1 drop into both eyes 4 times daily, Disp-1 Bottle, R-11, E-Prescribe      potassium chloride ER (KLOR-CON M) 10 MEQ CR tablet Take 2 tablets (20 mEq) by mouth daily, Disp-60 tablet,R-3, E-Prescribe      sertraline (ZOLOFT) 25 MG tablet Take 2 tablets (50 mg) by mouth daily, Disp-60 tablet,R-3, E-Prescribe      triamcinolone (KENALOG) 0.1 % external cream Apply topically 3 times dailyHistorical      Wound Dressings (HYDROFERA BLUE  "6\"X6\") PADS Externally apply 1 patch topically daily, Disp-10 each,R-1, E-Prescribe         STOP taking these medications       amLODIPine (NORVASC) 10 MG tablet Comments:   Reason for Stopping:         furosemide (LASIX) 20 MG tablet Comments:   Reason for Stopping:         hydrochlorothiazide (HYDRODIURIL) 25 MG tablet Comments:   Reason for Stopping:         traMADol (ULTRAM) 50 MG tablet Comments:   Reason for Stopping:         traMADol (ULTRAM) 50 MG tablet Comments:   Reason for Stopping:             Allergies   No Known Allergies    "

## 2020-09-23 NOTE — PLAN OF CARE
Observation goals  PRIOR TO DISCHARGE      Comments: -diagnostic tests and consults completed and resulted- Not Met  -vital signs normal or at patient baseline- MET  -tolerating oral intake to maintain hydration --- MET  Nurse to notify provider when observation goals have been met and patient is ready for discharge

## 2020-09-23 NOTE — PLAN OF CARE
BP (!) 154/75 (BP Location: Right arm)   Pulse 69   Temp 99.1  F (37.3  C) (Oral)   Resp 16   SpO2 92%     Status: Admission for DALILA r/t dehydration and malnutrition  Activity: Ax1 + gb/walker. Bed alarm on for safety, multiple falls in last month at assisted living facility.  Neuros: A&Ox4, [place], bilingual; Maltese and English speaking.  Cardiac: WDL, denies chest pain.  Respiratory: WDL on RA, denies SOB. LS clear, no cough.  GI/: +BS, LBM 9/20. Voids spont per pt report; commode ordered.  Diet: Tolerating regular diet.  Skin/Incisions: WDL ex blanchable redness on sacrum, mepilex applied. Mild bruising from lab draws on L arm. L foot ulcer dressing CDI, UTV; team aware.  Lines/Drains: L PIV SL. IVMF ordered, IV pump ordered.  Pain/Nausea: Denies.  New Changes: Pt admitted from ED around 2200 this shift.  Plan: Start IVMF, monitor labs and nutrition/hydration status.      -diagnostic tests and consults completed and resulted: Not complete  -vital signs normal or at patient baseline: Completed  -tolerating oral intake to maintain hydration: In Process

## 2020-09-23 NOTE — ED NOTES
[facility]   ED Nurse to Floor Handoff     Jatin Ayala is a 89 year old female who speaks Indonesian and lives with others,  in an assisted living  They arrived in the ED by ambulance from clinic    ED Chief Complaint: Flank Pain (concern for DALILA; kidney infection; very fatigued)    ED Dx;   Final diagnoses:   Acute kidney injury (H)   Left flank pain   Osteomyelitis of left foot, unspecified type (H)         Needed?: Yes    Allergies: No Known Allergies.  Past Medical Hx:   Past Medical History:   Diagnosis Date     CKD (chronic kidney disease) stage 3, GFR 30-59 ml/min (H)      Gastroesophageal reflux disease      Hypertension       Baseline Mental status: WDL  Current Mental Status changes: at basesline    Infection present or suspected this encounter: no  Sepsis suspected: No  Isolation type: No active isolations     Activity level - Baseline/Home:  Walker  Activity Level - Current:   Stand with Assist and Walker    Bariatric equipment needed?: No    In the ED these meds were given:   Medications   0.9% sodium chloride BOLUS (0 mLs Intravenous Stopped 9/22/20 1801)     Followed by   sodium chloride 0.9% infusion (has no administration in time range)       Drips running?  No    Home pump  No    Current LDAs  Peripheral IV 09/22/20 Left (Active)   Number of days: 0       Labs results:   Labs Ordered and Resulted from Time of ED Arrival Up to the Time of Departure from the ED   CBC WITH PLATELETS DIFFERENTIAL - Abnormal; Notable for the following components:       Result Value    Hemoglobin 11.1 (*)     MCHC 31.4 (*)     RDW 16.7 (*)     All other components within normal limits   COMPREHENSIVE METABOLIC PANEL - Abnormal; Notable for the following components:    Urea Nitrogen 43 (*)     Creatinine 1.97 (*)     GFR Estimate 21 (*)     GFR Estimate If Black 25 (*)     Albumin 3.1 (*)     Protein Total 6.6 (*)     All other components within normal limits   INR - Abnormal;  Notable for the following components:    INR 1.34 (*)     All other components within normal limits   LACTIC ACID WHOLE BLOOD   LIPASE   CRP INFLAMMATION   ERYTHROCYTE SEDIMENTATION RATE AUTO   UA MACROSCOPIC WITH REFLEX TO MICRO AND CULTURE       Imaging Studies:   Recent Results (from the past 24 hour(s))   POC US RETROPERITONEAL LIMITED    Impression    Limited Bedside Renal Ultrasound, performed and interpreted by me.    Indication: Flank pain  Images of the the right and left kidney were acquired in short and long axis, evaluating for hydronephrosis. A short and long axis of the bladder was evaluated for bladder stones. Image quality was satisfactory..     Findings:  No evidence of hydronephrosis in bilateral kidneys.    No urinary bladder stones seen.         IMPRESSION: No evidence of hydronephrosis or stones.     CT Abdomen Pelvis w/o Contrast    Narrative    EXAMINATION: CT ABDOMEN PELVIS W/O CONTRAST, 9/22/2020 6:24 PM    TECHNIQUE:  Helical CT images from the lung bases through the  symphysis pubis were obtained without contrast.  Coronal reformatted  images were generated at a workstation for further assessment.    COMPARISON: Abdomen and pelvis CT 2/13/2016    HISTORY: Flank pain, stone disease suspected - left    FINDINGS:    Abdomen and pelvis:   Liver: No suspicious liver lesions. No intrahepatic or extrahepatic  biliary ductal dilatation.  Gallbladder: Cholecystectomy.  Spleen: Normal size.  Pancreas: Diffusely atrophic appearance to the pancreas. The  pancreatic duct is not dilated.  Adrenal glands: No adrenal nodules.  Urinary system: No kidney masses. Areas of thickening in the left mid  to superior kidney demonstrated. 4 mm calcification within the  superior pole of the left kidney may be in a calyx with cortical  scarring in this region versus parenchymal. 3 mm nonobstructing stone  within the interpolar region of the right kidney. No hydronephrosis,  hydroureter, or obstructing renal stones.  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 Urinary bladder is unremarkable.  Reproductive organs: Peripherally calcified structures adjacent to the  uterus and adnexal regions stable from prior study. Vascular  calcifications in the uterus also present.  Bowel: Partial visualization of large hiatal hernia. Diverticulosis  without evidence for acute diverticulitis. Duodenal diverticulum. Mild  diffuse haziness of the mesentery is not significantly changed  compared to prior. No abnormally dilated loops of large or small  bowel. No abnormal bowel wall thickening. The appendix is  unremarkable.  Lymph nodes: No retroperitoneal, mesenteric, or pelvic  lymphadenopathy.  Fluid: No free fluid within the abdomen.  Vessels: No infrarenal aortic aneurysm. Extensive atherosclerotic  calcifications of the abdominal aorta and branch vessels. Ectatic  right common iliac artery measuring 1.6 cm (series 5, image 165)  Multiple pelvic phleboliths.    Lung bases: No consolidation or pleural effusion. Fibrosis/scarring at  the left lung base with scattered calcified granulomas within the left  lung base.    Bones and soft tissues: No suspicious osseous lesions. Osteopenic  appearance to the bones. Superior endplate compression deformity of T1  with Schmorl node deformity of the superior endplate. Grade 1  retrolisthesis of L1 on L2, L2 on L3, L3 and L4. Grade 1  anterolisthesis of L5 on S1. Advanced degenerative changes throughout  the visualized spine. Multiple calcifications within the gluteal soft  tissues likely injection granulomas.      Impression    IMPRESSION:   1.  No obstructing renal stone. 4 mm calcification within the superior  pole of the left kidney may be a nonobstructing stone versus  parenchymal dystrophic calcification. Areas of scarring and cortical  thinning in the mid to superior pole of the left kidney.  2.  Nonobstructing 3 mm renal  calculus within the right kidney.  3.  Diverticulosis without evidence for acute diverticulitis.  4.  Large hiatal hernia.  5.  Duodenal diverticulum.  6.  Extensive atherosclerotic calcifications of the abdominal aorta  and its branch vessels with unchanged ectasia of the right common  iliac artery measuring up to 1.6 cm.    I have personally reviewed the examination and initial interpretation  and I agree with the findings.    SAMMY MARTINEZ MD       Recent vital signs:   /80   Pulse 85   SpO2 100%             Cardiac Rhythm: Other  Pt needs tele? No  Skin/wound Issues: L toe osteomyelitis    Code Status: Full Code    Pain control: fair    Nausea control: pt had none    Abnormal labs/tests/findings requiring intervention: see results    Family present during ED course? Yes   Family Comments/Social Situation comments:     Tasks needing completion: None    Malaika Perez, RN    [phone] [facility]

## 2020-09-24 NOTE — TELEPHONE ENCOUNTER
Attempted to reach patient to follow up after recent hospitalization, unable to reach. No  set up on home number. RN then contacted EC daughter Keyla and left VM with name and call back number.    Patient was admitted to [facility] for DALILA, hypotension and osteomyelitis and, discharged 9/23/20.    FD: If patient calls back, please schedule hospital follow up visit, then transfer to RN.    Eliza Thrasher RN

## 2020-09-25 NOTE — PROGRESS NOTES
Verbal order given for requested Skilled Nursing visits per protocol. Will route to PCP to address duplication therapy found by HC RN.   Eliza Thrasher RN

## 2020-09-25 NOTE — PROGRESS NOTES
[facility] now requests orders and shares plan of care/discharge summaries for some patients through [employer].  Please REPLY TO THIS MESSAGE OR ROUTE BACK TO THE AUTHOR in order to give authorization for orders when needed.  This is considered a verbal order, you will still receive a faxed copy of orders for signature.  Thank you for your assistance in improving collaboration for our patients.    ORDER    Skilled nursing 2 week 2, 1 week 2, 3 PRN for labs, disease and symptom managment, pain management, toe care and assessment, pressure ulcer teaching, assessment, and prevention.     SN to draw lab BMP on 10/2.  Left second toe wound care: cleanse with saline, apply optifoam, secure with bandaid or tape. Complete daily.   Stage 1 pressure ulcer to coccyx. Cleanse daily with soap and water, apply barrier cream daily.     PT to evaluate and treat to include strengthening, ambulation, and balance.   OT to evaluate and treat to include pressure relief for pressure ulcer prevention, adls, and HSE.     Also please note, duplication therapy found: Lansoprazole/Famotidine.     Amanda Valdez RN Admission Clinician  [facility]  [phone]

## 2020-09-25 NOTE — TELEPHONE ENCOUNTER
[facility]: Post-Discharge Note  SITUATION                                                      Admission: 9/22/20          Discharge: 9/23/20       BACKGROUND                                                    Diana is a 89 year old female with a history of HTN, HLD, CKD3, and osteomyelitis of left toe who was admitted for pre-renal DALILA d/t poor PO intake. Given fluids overnight w/ subsequent improvement in renal function, but not resolution (from Cr 1.97 to 1.34). Thorough workup was otherwise unrevealing in terms of etiology of DALILA.  The following problems were addressed during her hospitalization:     # DALILA on CKD III, prerenal; (CT AP in ER negative for obstructing stone. UA not infectious).   # Borderline hypotension  # Decreased PO intake   # Weight loss (10 lb weight loss w/in last month per NH records seen in Media section)  History significant for these details: poor appetite with nausea and vomiting while taking antibiotics--not w/ every meal, but occasionally. Decreased appetite overall. Very picky eater. Poor dentition (edentulous bottom row d/t covid-related delays in denture fittings). Creatinine on admission 1.97 from baseline 1.1, improved to 1.34 s/p gentle fluids.   - Need ongoing discussions about appetite and goals of care  - Ensure prescribed w/ each meal by Dr. Young on 9/22  - Holding home BP meds (stopped hydrochlorothiazide, losartan, and amlodipine)   - Strict instructions to NH to resume losartan if BP >150/90; and to call clinic to schedule appt if BP >150/90 two days in a row  - Measure BP daily at NH        # Osteomyelitis  # Generalized weakness   Followed closely by Infectious Disease and previously Podiatry for left second toe ulcer and osteomyelitis of the head of the proximal phalanx. She is scheduled to complete her 6 week course of antibiotics (was on doxy+Flagyl, switched to doxy+Levaquin due to diarrhea) on 10/07/20. Not a candidate for amputation due to low  ABIs (R 0.66, L 0.59) predicting poor healing. Most recent imaging of the left foot XR 7/20/20 showing progression of osteo. She is afebrile with normal inflammatory markers, no leukocytosis.   - On discharge, continue meds as prescribed by ID: doxycycline 100 mg BID + levofloxacin 750 mg every other day  - Pain control:              - Tylenol 1,000 mg TID              - PTA lidocaine patches q24h  - Oxycodone 2.5 mg TID PRN (discussed w/ daughter and confirmed this and extra strength tylenol are helping a lot w/ pain and make it manageable; daughter also notes that tramadol makes Diana dizzy)    ASSESSMENT    Patient is [place], speaks [place] and is currently living at [facility]. RN first spoke with Sharri patient's daughter who reports patient appears to be doing alright since discharge. She had no questions regarding changes to medications. RN reviewed discharge instructions with daughter.   -Holding all BP meds for now with daily BP checks at NH. NH to restart Losartan if BP >150/90 and contact [facility] if this happens 2 days in a row.   - HC to see patient for lab draws (one prior to 9/27 and one 4-7 days after that). [facility] scheduled to see patient today 9/25/20..   -Patient to follow up with Dr. Ayala within 1 week. Sharri states they are out of town next week so she scheduled for 10/9/20. RN offered appt with Dr. Young or other provider sooner but she declined.     Sharri then provided RN with contact information for Jero ANTHONY [phone] the RN for patient's nursing home. RN contacted Jero and reviewed discharge instructions separately with her. She reports patient's systolic BP has been around 100-120 the last couple days. She did state that patient has been complaining of back pain. Patient states oxycodone and tylenol do not help. Jero is wondering if they could try tramadol again as this seems to have helped in the past. There was some concern with dizziness secondary to Tramadol  but Eriko states this was better at lower dose and patient was able to move around better when on it. If sending rx- please send to [facility] in [place].    RN also spoke to Amanda at [facility] who will be seeing patient today. Amanda confirmed that she will draw BMP as patient unable to come to [facility] today for labs due to lab being closed.       Routing to PCP to review and address question about pain medication.        PLAN                                                      Outpatient Plan:  [facility] care to draw labs 1x prior to 9/27/20 and another 4-7 days after that. Follow up with Dr. Ayala within 1 week- scheduled on 10/9/20 at daughter's request.     Future Appointments   Date Time Provider Department Center   10/2/2020  1:20 PM MARU [facility] LAB [facility] [place]   10/7/2020  2:00 PM Shan Neumann MD [facility]   10/9/2020 12:20 PM Eloy Carrion DPM Counts include 234 beds at the [facility]   12/15/2020  1:00 PM Jan Allen OD [facility]           Eliza Thrasher, MARTA

## 2020-09-26 NOTE — PROGRESS NOTES
Infectious Disease Clinic Staff Note: Ms. Ayala had a Telephone Virtual Visit today and I participated in (on the phone for > five minutes) and discussed the case with Dr. Hughes, ID Fellow -- I agree with her interval history, assessment and plan in this outpatient ID Virtual Progress note. This note reflects my observations and opinions and the plan outlined fully reflects my approach. I have reviewed the available history, radiology, laboratory results, and reports with the Fellow.

## 2020-10-02 PROBLEM — R79.89 ELEVATED TROPONIN: Status: ACTIVE | Noted: 2020-01-01

## 2020-10-02 PROBLEM — R41.82 ALTERED MENTAL STATUS, UNSPECIFIED ALTERED MENTAL STATUS TYPE: Status: ACTIVE | Noted: 2020-01-01

## 2020-10-02 NOTE — PROGRESS NOTES
"SPIRITUAL HEALTH SERVICES  [facility] ([facility]) ED  ON-CALL VISIT     REFERRAL SOURCE: Staff request    Paged to pt room to speak with pt as staff stated she was saying \"that her illness was spiritual.\"    Worked with pt for some time with  as she was difficult to understand even with . One of the words she kept repeating in [place] was the word \"pesticide.\" To the best of my understanding, pt stated \"God told me to tell TV station about this pesticide that is hurting people all over the world. We have to make it stop - go to TV stations and tell.\"  Pt became cold in the room and was able to communicate that she wanted the blankets up around her face for warmth.     PLAN:  remains available per request.     Rev. Lavinia Ellsworth MDiv, [facility]  Staff    Pager [phone]  * [facility] remains available 24/7 for emergent requests/referrals, either by having the switchboard page the on-call  or by entering an ASAP/STAT consult in Epic (this will also page the on-call ).*        "

## 2020-10-02 NOTE — ED PROVIDER NOTES
"  History     Chief Complaint   Patient presents with     Altered Mental Status     HPI  Jatin Ayala is a 89 year old female with a past medical history of CKD, GERD, HTN who presents to the emergency department with a chief complaint of AMS.  The patient presents from an assisted living facility with reported generalized weakness and paranoia noted by staff.  Per medics, they were told that the patient thinks that the [place] government is poisoning her.  The patient was recently seen in the emergency department for a similar complaint.  ER RN spoke with patient's daughter who stated that the patient was recently admitted for DALILA and placed on a heavy dose of antibiotics for osteomyelitis (L 2nd toe ulcer).  The patient has been compliant with this and is almost done with her course, but over the last several days has had decreased appetite, nausea and vomiting, generalized weakness, and decreased activity level.  When the daughter was notified that the patient was to be brought to the emergency department by nursing home staff, they did not mention anything about confusion or paranoia.    The pt reports that she thinks that she has a \"Adventism sickness.\" She thinks that it is her way to spread the word about God. She has been vomiting. Pt was unable to provide additional details about her symptoms.     I have reviewed the Medications, Allergies, Past Medical and Surgical History, and Social History in the [facility] system.    Past Medical History:   Diagnosis Date     CKD (chronic kidney disease) stage 3, GFR 30-59 ml/min      Gastroesophageal reflux disease      Hypertension      Past Surgical History:   Procedure Laterality Date     CATARACT IOL, RT/LT       REPAIR PTOSIS       Current Facility-Administered Medications   Medication     0.9% sodium chloride BOLUS    Followed by     sodium chloride 0.9% infusion     ondansetron (ZOFRAN) injection 4 mg     Current Outpatient Medications   Medication     acetaminophen " "(TYLENOL) 500 MG tablet     ASPIRIN NOT PRESCRIBED, INTENTIONAL,     capsaicin (ZOSTRIX) 0.025 % external cream     ciclopirox (PENLAC) 8 % external solution     diclofenac (VOLTAREN) 1 % topical gel     doxycycline hyclate (VIBRA-TABS) 100 MG tablet     famotidine (PEPCID) 20 MG tablet     Ginkgo Biloba (GINKOBA PO)     HYPROMELLOSE-DEXTRAN 0.3-0.1% opthalmic solution     LANsoprazole (PREVACID) 30 MG DR capsule     levofloxacin (LEVAQUIN) 750 MG tablet     lidocaine (LMX4) 4 % external cream     loratadine (CLARITIN) 10 MG tablet     losartan (COZAAR) 50 MG tablet     LUTEIN PO     Nutritional Supplements (ENSURE ORIGINAL) LIQD     oxyCODONE (ROXICODONE) 5 MG tablet     polyethylene glycol (MIRALAX/GLYCOLAX) powder     polyethylene glycol-propylene glycol (SYSTANE ULTRA) 0.4-0.3 % SOLN ophthalmic solution     potassium chloride ER (KLOR-CON M) 10 MEQ CR tablet     sertraline (ZOLOFT) 25 MG tablet     triamcinolone (KENALOG) 0.1 % external cream     Wound Dressings (HYDROFERA BLUE 6\"X6\") PADS     No Known Allergies  Past medical history, past surgical history, medications, and allergies were reviewed with the patient. Additional pertinent items: None    Social History     Socioeconomic History     Marital status:      Spouse name: Not on file     Number of children: Not on file     Years of education: Not on file     Highest education level: Not on file   Occupational History     Not on file   Social Needs     Financial resource strain: Not on file     Food insecurity     Worry: Not on file     Inability: Not on file     Transportation needs     Medical: Not on file     Non-medical: Not on file   Tobacco Use     Smoking status: Never Smoker     Smokeless tobacco: Never Used   Substance and Sexual Activity     Alcohol use: Not Currently     Drug use: Not Currently     Sexual activity: Not Currently   Lifestyle     Physical activity     Days per week: Not on file     Minutes per session: Not on file     " Stress: Not on file   Relationships     Social connections     Talks on phone: Not on file     Gets together: Not on file     Attends Buddhist service: Not on file     Active member of club or organization: Not on file     Attends meetings of clubs or organizations: Not on file     Relationship status: Not on file     Intimate partner violence     Fear of current or ex partner: Not on file     Emotionally abused: Not on file     Physically abused: Not on file     Forced sexual activity: Not on file   Other Topics Concern     Not on file   Social History Narrative     Not on file     Social history was reviewed with the patient. Additional pertinent items: None    Review of Systems  General: No fevers or chills  Skin: No rash or diaphoresis  Eyes: No eye redness or discharge  Ears/Nose/Throat: No rhinorrhea or nasal congestion, positive for hard of hearing  Respiratory: No cough or SOB  Cardiovascular: No chest pain or palpitations  Gastrointestinal: No nausea, vomiting, or diarrhea  Genitourinary: No urinary frequency, hematuria, or dysuria  Musculoskeletal: No arthralgias or myalgias  Neurologic: positive for generalized weakness and memory loss  Hematologic/Lymphatic/Immunologic: No leg swelling, no easy bruising/bleeding  Endocrine: No polyuria/polydypsia    A complete review of systems was performed with pertinent positives and negatives noted in the HPI, and all other systems negative.    Physical Exam   BP: 121/75  Pulse: 81  Temp: 97.5  F (36.4  C)  Resp: 18  SpO2: 100 %      General: Well nourished, well developed, NAD  HEENT: EOMI, anicteric. NCAT, MMM  Neck: no jugular venous distension, supple, nl ROM  Cardiac: Regular rate and rhythm. No murmurs, rubs, or gallops. Normal S1, S2.  Intact peripheral pulses  Pulm: CTAB, no stridor, wheezes, rales, rhonchi  Abd: Soft, nontender, nondistended.  No masses palpated.    Skin: Warm and dry to the touch.  No rash  Extremities: No LE edema, no cyanosis,  w/w/p  Neuro: Alert, disoriented to situation, MELENDEZ    ED Course     ED Course as of Oct 02 1245   Fri Oct 02, 2020   1230 UA reflex to Microscopic and Culture     Procedures                EKG Interpretation:      Interpreted by Marry Dacosta MD  Time reviewed: 1245  Symptoms at time of EKG: Generalized weakness  Rhythm: normal sinus   Rate: normal, 80 bpm  Axis: NORMAL  Ectopy: none  Conduction: normal  ST Segments/ T Waves: Diffuse T wave flattening, inversion in anterior lateral leads  Q Waves: V1  Comparison to prior: poor R wave progression and new anterolateral T wave flattening/inversion compared to prior EKG, dated February 13, 2016, otherwise no acute changes    Clinical Impression: abnormal EKG, age indeterminate anterolateral infarct                  Labs Ordered and Resulted from Time of ED Arrival Up to the Time of Departure from the ED   CBC WITH PLATELETS DIFFERENTIAL - Abnormal; Notable for the following components:       Result Value    RBC Count 3.73 (*)     Hemoglobin 10.7 (*)     Hematocrit 33.7 (*)     RDW 16.7 (*)     Platelet Count 95 (*)     All other components within normal limits   COMPREHENSIVE METABOLIC PANEL - Abnormal; Notable for the following components:    Creatinine 1.37 (*)     GFR Estimate 33 (*)     GFR Estimate If Black 38 (*)     Calcium 7.4 (*)     Albumin 2.6 (*)     Protein Total 5.3 (*)     All other components within normal limits   TROPONIN I - Abnormal; Notable for the following components:    Troponin I ES 0.059 (*)     All other components within normal limits   UA MACROSCOPIC WITH REFLEX TO MICRO AND CULTURE - Abnormal; Notable for the following components:    Blood Urine Trace (*)     Leukocyte Esterase Urine Small (*)     RBC Urine 11 (*)     Yeast Urine Many (*)     Mucous Urine Present (*)     All other components within normal limits   MAGNESIUM - Abnormal; Notable for the following components:    Magnesium 1.3 (*)     All other components within normal  limits   TSH WITH FREE T4 REFLEX   CRP INFLAMMATION   PROCALCITONIN            Results for orders placed or performed during the hospital encounter of 10/02/20 (from the past 24 hour(s))   CBC with platelets differential   Result Value Ref Range    WBC 5.5 4.0 - 11.0 10e9/L    RBC Count 3.73 (L) 3.8 - 5.2 10e12/L    Hemoglobin 10.7 (L) 11.7 - 15.7 g/dL    Hematocrit 33.7 (L) 35.0 - 47.0 %    MCV 90 78 - 100 fl    MCH 28.7 26.5 - 33.0 pg    MCHC 31.8 31.5 - 36.5 g/dL    RDW 16.7 (H) 10.0 - 15.0 %    Platelet Count 95 (L) 150 - 450 10e9/L    Diff Method Automated Method     % Neutrophils 58.9 %    % Lymphocytes 32.3 %    % Monocytes 7.5 %    % Eosinophils 0.4 %    % Basophils 0.4 %    % Immature Granulocytes 0.5 %    Nucleated RBCs 0 0 /100    Absolute Neutrophil 3.2 1.6 - 8.3 10e9/L    Absolute Lymphocytes 1.8 0.8 - 5.3 10e9/L    Absolute Monocytes 0.4 0.0 - 1.3 10e9/L    Absolute Eosinophils 0.0 0.0 - 0.7 10e9/L    Absolute Basophils 0.0 0.0 - 0.2 10e9/L    Abs Immature Granulocytes 0.0 0 - 0.4 10e9/L    Absolute Nucleated RBC 0.0    Comprehensive metabolic panel   Result Value Ref Range    Sodium 140 133 - 144 mmol/L    Potassium 3.4 3.4 - 5.3 mmol/L    Chloride 107 94 - 109 mmol/L    Carbon Dioxide 28 20 - 32 mmol/L    Anion Gap 5 3 - 14 mmol/L    Glucose 97 70 - 99 mg/dL    Urea Nitrogen 30 7 - 30 mg/dL    Creatinine 1.37 (H) 0.52 - 1.04 mg/dL    GFR Estimate 33 (L) >60 mL/min/[1.73_m2]    GFR Estimate If Black 38 (L) >60 mL/min/[1.73_m2]    Calcium 7.4 (L) 8.5 - 10.1 mg/dL    Bilirubin Total 0.7 0.2 - 1.3 mg/dL    Albumin 2.6 (L) 3.4 - 5.0 g/dL    Protein Total 5.3 (L) 6.8 - 8.8 g/dL    Alkaline Phosphatase 62 40 - 150 U/L    ALT 22 0 - 50 U/L    AST 19 0 - 45 U/L   Troponin I   Result Value Ref Range    Troponin I ES 0.059 (H) 0.000 - 0.045 ug/L   TSH with free T4 reflex   Result Value Ref Range    TSH 1.78 0.40 - 4.00 mU/L   Magnesium   Result Value Ref Range    Magnesium 1.3 (L) 1.6 - 2.3 mg/dL   CRP  inflammation   Result Value Ref Range    CRP Inflammation <2.9 0.0 - 8.0 mg/L   CT Head w/o Contrast    Narrative    CT head without contrast 10/2/2020 11:47 AM    History: Altered level of consciousness     Comparison: None    Technique: Using multidetector thin collimation helical acquisition  technique, axial, coronal and sagittal CT images from the skull base  to the vertex were obtained without intravenous contrast.    Findings: There is no intracranial hemorrhage, mass effect, or midline  shift. Ventricles are proportionate to the cerebral sulci. The basal  cisterns are clear. Age-related volume loss. Periventricular and  subcortical hypodensities, likely chronic small vessel ischemic  disease. Chronic lacunar infarct in the right internal capsule. Cavum  septum pellucidum. Atherosclerotic calcifications of the basilar and  carotid arteries.    The bony calvaria and the bones of the skull base are normal. The  visualized portions of the paranasal sinuses and mastoid air cells are  clear.       Impression    Impression:  No acute intracranial pathology. Chronic small vessel ischemic disease  age-related cerebral volume loss.    I have personally reviewed the examination and initial interpretation  and I agree with the findings.    JAKE ARRIAGA MD   EKG 12-lead, tracing only   Result Value Ref Range    Interpretation ECG Click View Image link to view waveform and result    UA reflex to Microscopic and Culture    Specimen: Urine catheter; Catheterized Urine   Result Value Ref Range    Color Urine Yellow     Appearance Urine Clear     Glucose Urine Negative NEG^Negative mg/dL    Bilirubin Urine Negative NEG^Negative    Ketones Urine Negative NEG^Negative mg/dL    Specific Gravity Urine 1.016 1.003 - 1.035    Blood Urine Trace (A) NEG^Negative    pH Urine 7.0 5.0 - 7.0 pH    Protein Albumin Urine Negative NEG^Negative mg/dL    Urobilinogen mg/dL Normal 0.0 - 2.0 mg/dL    Nitrite Urine Negative NEG^Negative     "Leukocyte Esterase Urine Small (A) NEG^Negative    Source Catheterized Urine     RBC Urine 11 (H) 0 - 2 /HPF    WBC Urine 4 0 - 5 /HPF    Yeast Urine Many (A) NEG^Negative /HPF    Squamous Epithelial /HPF Urine 1 0 - 1 /HPF    Transitional Epi 1 0 - 1 /HPF    Mucous Urine Present (A) NEG^Negative /LPF       Labs, vital signs, and imaging studies were reviewed by me.    Medications   ondansetron (ZOFRAN) injection 4 mg (has no administration in time range)   0.9% sodium chloride BOLUS (0 mLs Intravenous Stopped 10/2/20 1125)     Followed by   sodium chloride 0.9% infusion ( Intravenous New Bag 10/2/20 1126)       Assessments & Plan (with Medical Decision Making)   Jatin Ayala is a 89 year old female who presents with AMS. Ddx includes infection (UTI, pna, osteomyelitis), metabolic abnormality, CVA, psychiatric illness. Labs, UA, head CT ordered to further evaluate the patient. Lungs are clear on exam making pna less likely.    Labs are remarkable for elevated troponin. EKG shows changes compared to EKG from 2016, but no acute appearing ST changes    Head CT shows NAD    EKG shows age-indeterminate anterolateral infarct, poor R wave progression and new anterolateral T wave flattening/inversion compared to prior EKG, dated February 13, 2016, otherwise no acute changes    Patient requested  as she feels her symptoms are due to \"Zoroastrian pain.\"   was paged and saw the patient in the emergency department.   states that the patient told them that she believes she needs to warn people about pesticide poisoning them.    I have reviewed the nursing notes.    I have reviewed the findings, diagnosis, plan and need for follow up with the patient.    Pt d/w cardiology, they recommend admission to IM, trending troponin, and an echocardiogram    Patient discussed with IM, to be admitted to their service for further management. Plan was discussed with patient who understands and agrees with plan.     New " Prescriptions    No medications on file       Final diagnoses:   Altered mental status, unspecified altered mental status type   Elevated troponin       10/2/2020   [facility] EMERGENCY DEPARTMENT     Marry Dacosta MD  10/02/20 1896

## 2020-10-02 NOTE — LETTER
Health Information Management Services               Recipient:    [facility] admissions; attn: Mrs. Dexter      Sender:    ALEIDA Devine, [facility]  [phone]    Date: October 8, 2020  Patient Name:  Jatin Ayala  Routing Message:      Enclosed are physician orders for Ms. Ayala      The documents accompanying this notice contain confidential information belonging to the sender.  This information is intended only for the use of the individual or entity named above.  The authorized recipient of this information is prohibited from disclosing this information to any other party and is required to destroy the information after its stated need has been fulfilled, unless otherwise required by state law.      If you are not the intended recipient, you are hereby notified that any disclosure, copy, distribution or action taken in reliance on the contents of these documents is strictly prohibited.  If you have received this document in error, please return it by fax to [phone] with a note on the cover sheet explaining why you are returning it (e.g. not your patient, not your provider, etc.).  If you need further assistance, please call [facility] Centralized Transcription at [phone].  Documents may also be returned by mail to [employer], , [facility] [address], [place], [place] [address].

## 2020-10-02 NOTE — ED NOTES
Bed: ED07  Expected date:   Expected time:   Means of arrival:   Comments:  HEMS 418  89Y F  Weakness, confusion  NO CURRENT COVID  YELLOW

## 2020-10-02 NOTE — LETTER
Health Information Management Services               Recipient:    [facility]; attn: Sarah      Sender:    Hardy Mckoy MSW, [facility]  [phone]    Date: October 8, 2020  Patient Name:  Jatin Ayala  Routing Message:      Enclosed are the physician orders with Hospice orders included      The documents accompanying this notice contain confidential information belonging to the sender.  This information is intended only for the use of the individual or entity named above.  The authorized recipient of this information is prohibited from disclosing this information to any other party and is required to destroy the information after its stated need has been fulfilled, unless otherwise required by state law.      If you are not the intended recipient, you are hereby notified that any disclosure, copy, distribution or action taken in reliance on the contents of these documents is strictly prohibited.  If you have received this document in error, please return it by fax to [phone] with a note on the cover sheet explaining why you are returning it (e.g. not your patient, not your provider, etc.).  If you need further assistance, please call [facility] Centralized Transcription at [phone].  Documents may also be returned by mail to [employer], , [facility] [address], [place], [place] [address].

## 2020-10-02 NOTE — LETTER
Health Information Management Services               Recipient:    [facility] admissions; attn: Mrs. Dexter      Sender:    ALEIDA Devine, [facility]  [phone]    Date: October 8, 2020  Patient Name:  Jatin Ayala  Routing Message:      Please review for admission. Ms. Ayala's family is interested in pursuing Hospice services for pt upon discharge from hospital.      The documents accompanying this notice contain confidential information belonging to the sender.  This information is intended only for the use of the individual or entity named above.  The authorized recipient of this information is prohibited from disclosing this information to any other party and is required to destroy the information after its stated need has been fulfilled, unless otherwise required by state law.      If you are not the intended recipient, you are hereby notified that any disclosure, copy, distribution or action taken in reliance on the contents of these documents is strictly prohibited.  If you have received this document in error, please return it by fax to [phone] with a note on the cover sheet explaining why you are returning it (e.g. not your patient, not your provider, etc.).  If you need further assistance, please call [facility] Centralized Transcription at [phone].  Documents may also be returned by mail to [employer], , [facility], [place], [place] [address].

## 2020-10-02 NOTE — ED PROVIDER NOTES
"    [place] EMERGENCY DEPARTMENT ([facility])  October 2, 2020  History     Chief Complaint   Patient presents with     Altered Mental Status     HPI  Jatin Ayala is a 89 year old Thai speaking female with a PMH for HTN, HLD, CKD3, osteomyelitis(2nd left toe), osteoporosis, ANJANA and mild depression who presents to the ED with complaint of generalized weakness and paranoia.    Patient reports ***.    Per chart review, patient was admitted 09/22/20 - 09/23/20 for fatigue, worsening creatinine and lightheadedness with standing. There was some upper left quadrant abdominal tenderness for which a CT was done, impression included below. She tested negative for COVID-19 during this stay.     EXAMINATION: CT ABDOMEN PELVIS W/O CONTRAST, 9/22/2020 6:24 PM   IMPRESSION:   1.  No obstructing renal stone. 4 mm calcification within the superior  pole of the left kidney may be a nonobstructing stone versus  parenchymal dystrophic calcification. Areas of scarring and cortical  thinning in the mid to superior pole of the left kidney.  2.  Nonobstructing 3 mm renal calculus within the right kidney.  3.  Diverticulosis without evidence for acute diverticulitis.  4.  Large hiatal hernia.  5.  Duodenal diverticulum.  6.  Extensive atherosclerotic calcifications of the abdominal aorta  and its branch vessels with unchanged ectasia of the right common  iliac artery measuring up to 1.6 cm.     PAST MEDICAL HISTORY:   Past Medical History:   Diagnosis Date     CKD (chronic kidney disease) stage 3, GFR 30-59 ml/min (H)      Gastroesophageal reflux disease      Hypertension        PAST SURGICAL HISTORY:   Past Surgical History:   Procedure Laterality Date     CATARACT IOL, RT/LT       REPAIR PTOSIS         Past medical history, past surgical history, medications, and allergies were reviewed with the patient. Additional pertinent items: {NONE:979634::\"None\"}    FAMILY HISTORY:   Family History   Problem Relation Age of Onset     " "Glaucoma No family hx of      Macular Degeneration No family hx of        SOCIAL HISTORY:   Social History     Tobacco Use     Smoking status: Never Smoker     Smokeless tobacco: Never Used   Substance Use Topics     Alcohol use: Not Currently     Social history was reviewed with the patient. Additional pertinent items: {NONE:121859::\"None\"}      Patient's Medications   New Prescriptions    No medications on file   Previous Medications    ACETAMINOPHEN (TYLENOL) 500 MG TABLET    Take 2 tablets (1,000 mg) by mouth 3 times daily    ASPIRIN NOT PRESCRIBED, INTENTIONAL,    continuous prn for other Antiplatelet medication not prescribed intentionally due to {CHOOSE REASON BEFORE SIGNING!!!:877119}    CAPSAICIN (ZOSTRIX) 0.025 % EXTERNAL CREAM    Apply topically 3 times daily    CICLOPIROX (PENLAC) 8 % EXTERNAL SOLUTION    Apply 1 Application topically At Bedtime Apply to toes    DICLOFENAC (VOLTAREN) 1 % TOPICAL GEL    Place 2 g onto the skin 4 times daily as needed for moderate pain Apply to knees, hips, and shoulders    DOXYCYCLINE HYCLATE (VIBRA-TABS) 100 MG TABLET    Take 1 tablet (100 mg) by mouth 2 times daily    FAMOTIDINE (PEPCID) 20 MG TABLET    Take 20 mg by mouth every morning     GINKGO BILOBA (GINKOBA PO)    Take 1 capsule by mouth every evening    HYPROMELLOSE-DEXTRAN 0.3-0.1% OPTHALMIC SOLUTION    INSTILL TWO DROPS INTO BOTH EYES EVERY 6 HOURS IF NEEDED FOR DRY EYES.    LANSOPRAZOLE (PREVACID) 30 MG DR CAPSULE    Take 1 capsule (30 mg) by mouth daily    LEVOFLOXACIN (LEVAQUIN) 750 MG TABLET    Take 1 tablet (750 mg) by mouth every other day    LIDOCAINE (LMX4) 4 % EXTERNAL CREAM    Apply topically once as needed for mild pain    LORATADINE (CLARITIN) 10 MG TABLET    Take 1 tablet (10 mg) by mouth daily    LOSARTAN (COZAAR) 50 MG TABLET    Take 1 tablet (50mg) by mouth daily only if blood pressure is greater than 150/90.    LUTEIN PO    Take 1 capsule by mouth every morning    NUTRITIONAL SUPPLEMENTS " "(ENSURE ORIGINAL) LIQD    Take 1 Bottle by mouth 3 times daily (with meals)    OXYCODONE (ROXICODONE) 5 MG TABLET    Take 0.5 tablets (2.5 mg) by mouth 3 times daily as needed for moderate to severe pain (Try to prescribe BID, but okay to do TID if need for breakthrough .)    POLYETHYLENE GLYCOL (MIRALAX/GLYCOLAX) POWDER    Take 17 g (1 capful) by mouth daily    POLYETHYLENE GLYCOL-PROPYLENE GLYCOL (SYSTANE ULTRA) 0.4-0.3 % SOLN OPHTHALMIC SOLUTION    Place 1 drop into both eyes 4 times daily    POTASSIUM CHLORIDE ER (KLOR-CON M) 10 MEQ CR TABLET    Take 2 tablets (20 mEq) by mouth daily    SERTRALINE (ZOLOFT) 25 MG TABLET    Take 2 tablets (50 mg) by mouth daily    TRIAMCINOLONE (KENALOG) 0.1 % EXTERNAL CREAM    Apply topically 3 times daily    WOUND DRESSINGS (HYDROFERA BLUE 6\"X6\") PADS    Externally apply 1 patch topically daily   Modified Medications    No medications on file   Discontinued Medications    No medications on file        No Known Allergies     Review of Systems  {Complete vs limited ROS:902145::\"A complete review of systems was performed with pertinent positives and negatives noted in the HPI, and all other systems negative.\"}    Physical Exam          Physical Exam    ED Course        Procedures        {EKG done?:933466::\" \"}    {ed addendum:662231::\" \"}  {trauma activation or Fall?:112363::\" \"}  {Sepsis/Septic Shock/Stemi/Stroke:156096::\" \"}       No results found for this or any previous visit (from the past 24 hour(s)).  Medications - No data to display          Assessments & Plan (with Medical Decision Making)   ***    I have reviewed the nursing notes.    I have reviewed the findings, diagnosis, plan and need for follow up with the patient.    New Prescriptions    No medications on file       Final diagnoses:   None       10/2/2020   [facility] EMERGENCY DEPARTMENT  "

## 2020-10-02 NOTE — ED NOTES
Daughter contacted via RN to get story of what is going on with patient. Daughter stated this patient was recently admitted for low kidney function shown in her labs. She was also placed on a heavy dose of antibiotics for a bone infection. She has been on antibiotics and is almost completed with the course but over the last few days she has had decreased appetite, nausea, vomiting, weakness, and decreased level of activity. Per daughter, nursing home did not mention anything about confusion or paranoia. Daughter states pt is primarily Yoruba speaking but if spoken to slowly in the left ear, the patient can understand ED staff.

## 2020-10-02 NOTE — ED TRIAGE NOTES
Pt comes from assisted living type facility due to reported weakness and paranoia noted by the staff. Per medics, pt thinks that the [place] government is poisoning her. Pt was recently seen in ED for similar complaint. [place]  used via Ipad.

## 2020-10-02 NOTE — H&P
[facility] History and Physical        Date of Admission:  10/2/2020  Date of Service: 10/2/2020         HPI      Chief Complaint   Altered Mental Status    History is obtained from the ED physician, EHR, patient's daughters, and patient's AL facility RN.    History of Present Illness   Diana is a 89 year old female who has a history of CKD, GERD, HTN, Osteomyelitis on doxy/levo with 10/7 end date, recent hospitalization for pre-renal DALILA and is admitted for weakness and acute cardiac syndrome rule out. Pt was recently discharged on 9/25 from our service for an DALILA back to her assisted living facility. She was doing well there for a couple days until 2-3 days ago when she became more weak and confused, needing more assists for transfers. She has also been intermittently more confused than her baseline as well, at one point saying that a virus was attached to her rice. Per her facility RN, the reason she came to the hospital was per patient request, however she really has not been eating much at all recently. She did vomit twice this morning too, which is new for her. Her daughters confirm that she has not been eating much at all either. They did have a care conference with her facility yesterday, which resulted in discussions of a probable need for escalation of cares due to her increasing needs. Her daughters do feel that this is all explained by worsening of her chronic dementia. As for pain, she was discharged from the hospital with oxycodone for pain, and scheduled tylenol. Per her daughters, the oxycodone wasn't working well (though the tylenol seemed to be), so she was switched back to tramadol, which does cause her to be more dizzy. In regards to her osteomyelitis, she was initially treated with doxy/flagyl, but developed diarrhea so was switched to doxy/levo on 9/11/20 by ID, with plans to finish the course with an end date of  10/7/20.    ED Course: Vital signs within normal limits and stable without abnormality. Labs notable for Creatinine of 1.37 (1.36 on recent discharge), magnesium of 1.3, albumin of 2.6, negative CRP/Procalcitonin, normal TSH, stable CBC. She had a UA that was largely non-infectious, but did have yeast in it (catheter specimen). CT head was negative for acute processes. Troponin was 0.059, and EKG showed indeterminate age anteriolateral infarct with T wave inversions compared to last EKG which was in 2016. This was discussed with cardiology who recommended admission to trend troponins and get an echo. She was admitted in stable condition to a med/surg floor with telemetry.         History:   Review of Systems   Unable to complete 10 point ROS due to dementia and language barriers, but pt did deny chest pain or shortness of breath.    Past Medical History    I have reviewed this patient's medical history and updated it with pertinent information if needed.   Past Medical History:   Diagnosis Date     CKD (chronic kidney disease) stage 3, GFR 30-59 ml/min      Gastroesophageal reflux disease      Hypertension         Past Surgical History   I have reviewed this patient's surgical history and updated it with pertinent information if needed.  Past Surgical History:   Procedure Laterality Date     CATARACT IOL, RT/LT       REPAIR PTOSIS         Social History   Social History     Tobacco Use     Smoking status: Never Smoker     Smokeless tobacco: Never Used   Substance Use Topics     Alcohol use: Not Currently     Drug use: Not Currently       Family History   I have reviewed this patient's family history and updated it with pertinent information if needed.   Family History   Problem Relation Age of Onset     Glaucoma No family hx of      Macular Degeneration No family hx of        Prior to Admission Medications    Current Facility-Administered Medications   Medication     acetaminophen (TYLENOL) tablet 1,000 mg      diclofenac (VOLTAREN) 1 % topical gel 2 g     doxycycline hyclate (VIBRA-TABS) tablet 100 mg     [START ON 10/3/2020] famotidine (PEPCID) tablet 20 mg     [START ON 10/3/2020] levofloxacin (LEVAQUIN) tablet 500 mg     lidocaine (LMX4) cream     lidocaine 1 % 0.1-1 mL     loratadine (CLARITIN) tablet 10 mg     magnesium sulfate 4 g in 100 mL sterile water (premade)     melatonin tablet 1 mg     naloxone (NARCAN) injection 0.1-0.4 mg     ondansetron (ZOFRAN-ODT) ODT tab 4 mg    Or     ondansetron (ZOFRAN) injection 4 mg     [START ON 10/3/2020] pantoprazole (PROTONIX) EC tablet 40 mg     polyethylene glycol (MIRALAX) Packet 17 g     polyethylene glycol-propylene glycol (SYSTANE ULTRA) ophthalmic solution 1 drop     potassium phosphate 15 mmol in D5W 250 mL intermittent infusion     potassium phosphate 20 mmol in D5W 250 mL intermittent infusion     potassium phosphate 20 mmol in D5W 500 mL intermittent infusion     potassium phosphate 25 mmol in D5W 500 mL intermittent infusion     [START ON 10/3/2020] sertraline (ZOLOFT) tablet 50 mg     sodium chloride (PF) 0.9% PF flush 3 mL     sodium chloride (PF) 0.9% PF flush 3 mL     sodium chloride 0.9% infusion     Current Outpatient Medications   Medication     acetaminophen (TYLENOL) 500 MG tablet     ASPIRIN NOT PRESCRIBED, INTENTIONAL,     capsaicin (ZOSTRIX) 0.025 % external cream     ciclopirox (PENLAC) 8 % external solution     diclofenac (VOLTAREN) 1 % topical gel     doxycycline hyclate (VIBRA-TABS) 100 MG tablet     famotidine (PEPCID) 20 MG tablet     Ginkgo Biloba (GINKOBA PO)     HYPROMELLOSE-DEXTRAN 0.3-0.1% opthalmic solution     LANsoprazole (PREVACID) 30 MG DR capsule     levofloxacin (LEVAQUIN) 750 MG tablet     lidocaine (LMX4) 4 % external cream     loratadine (CLARITIN) 10 MG tablet     losartan (COZAAR) 50 MG tablet     LUTEIN PO     Nutritional Supplements (ENSURE ORIGINAL) LIQD     oxyCODONE (ROXICODONE) 5 MG tablet     polyethylene glycol  "(MIRALAX/GLYCOLAX) powder     polyethylene glycol-propylene glycol (SYSTANE ULTRA) 0.4-0.3 % SOLN ophthalmic solution     potassium chloride ER (KLOR-CON M) 10 MEQ CR tablet     sertraline (ZOLOFT) 25 MG tablet     triamcinolone (KENALOG) 0.1 % external cream     Wound Dressings (HYDROFERA BLUE 6\"X6\") PADS      Allergies   No Known Allergies        Physical Exam      Vital Signs: Temp: 97.5  F (36.4  C) Temp src: Oral BP: 129/79 Pulse: 81   Resp: 18 SpO2: 100 % O2 Device: None (Room air)    Weight: 0 lbs 0 oz    Physical Exam  Constitutional:       General: She is not in acute distress.     Appearance: She is well-developed. She is not ill-appearing, toxic-appearing or diaphoretic.      Comments: Thin appearing   HENT:      Head: Normocephalic and atraumatic.      Nose: Nose normal.      Mouth/Throat:      Comments: Membranes tachy  Eyes:      General: No scleral icterus.     Extraocular Movements: Extraocular movements intact.      Conjunctiva/sclera: Conjunctivae normal.   Neck:      Musculoskeletal: Normal range of motion.   Cardiovascular:      Rate and Rhythm: Normal rate and regular rhythm.      Heart sounds: Normal heart sounds. No murmur. No friction rub.   Pulmonary:      Effort: Pulmonary effort is normal. No respiratory distress.      Breath sounds: Normal breath sounds. No wheezing, rhonchi or rales.   Abdominal:      General: Bowel sounds are normal. There is no distension.      Palpations: Abdomen is soft. There is mass (firm, mobile mass palpated in LLQ (non-tender)).      Tenderness: There is no abdominal tenderness. There is no right CVA tenderness, left CVA tenderness, guarding or rebound.      Hernia: No hernia is present.   Skin:     General: Skin is warm and dry.      Findings: Erythema (mild erythema on second toe of right foot, stable from previous) present.   Neurological:      General: No focal deficit present.      Mental Status: She is alert and oriented to person, place, and time.      " Cranial Nerves: No cranial nerve deficit.      Comments: Hard of hearing, uses pocket talker for communication         Assessment & Plan      Diana is a 89 year old female admitted on 10/2/2020. She has a history of history of CKD, GERD, HTN, Osteomyelitis on doxy/levo with 10/7 end date, recent hospitalization for pre-renal DALILA and is admitted for delirium and elevated cardiac enzymes.    # Elevated troponin  # Anterolateral infarct of unknown age  Troponin 0.059 on admit, with T wave inversions in manjit-lateral leads on EKG, new since 2016. Unclear duration. Pt denies chest pain and shortness of breath on admission and is not hypoxic or tachycardic. Case was discussed with cardiology by ED physician who recommended admission for troponin trend and echo.  - Telemetry  - Trend troponin until down-trending (may be slow trend due to CKD)  - Echocardiogram  - Monitor clinically    # Weakness  # Delirium  # Deconditioning  Differential is broad at this point, and it is even unclear whether or not she is truly delirious or if she is having progression of her chronic dementia as her daughters do think.  She has had odd statements at her AL facility that are reportedly new by her RN there, which would suggest delirium. Regardless, will plan to monitor and rule out any reversible causes of worsened mental status tonight. UA clear, but with yeast unlikely to be true infection (treated with diflucan x1 in the ED), CT head negative. NSTEMI a possibility as well. Will continue to consider pain as a source and treat appropriately. Urinary retention and constipation (especially with mass palpated on exam) also on the differential.  - Cardiac workup as above  - Delirium precautions  - Replace electrolytes  - Bladder scan  - Daily miralax  - S/p 500 ml NS in ED with MIVF to follow    # Osteomyelitis  Doing well on Doxy/Levo with end date of 10/7. Do not feel levo is contributing to delirium as she started this on 9/11. Exam of toe  reassuring against worsening infection.  - Continue doxy and levo    # CKD III  Unclear Cr baseline, but likely around 1.2. Creatinine on admission was 1.37, from 1.36 on recent discharge. No change. Will continue to monitor.  - Daily BMP    # HTN  Was on three drug regimen prior to last admission and was admitted with hypotension so medications were held. Not currently on any medications and BP normal.  - No anti-hypertensives at this point  - Continue to monitor BP    # GERD  - Continue pta H2 blocker and PPI    # Pain Assessment:  Current Pain Score 9/23/2020   Patient currently in pain? yes   Pain score (0-10) -   Jatin bird pain level was assessed and she currently denies pain.      Diet: No diet orders on file  Fluids: NS at 125 mL/hr after 500 ml bolus in ED  DVT Prophylaxis: Pneumatic Compression Devices  Code Status: DNR / DNI    Disposition Plan   Expected discharge: Tomorrow; recommended to prior living arrangement once mental status at baseline and cardiac disease ruled out. Dispo:         Entered: Edi Funk 10/02/2020, 2:07 PM   Information in the above section will display in the discharge planner report.    Discussed with and examined by faculty.    Edi Funk    [facility]  [facility]   Pager: [phone]  Please see sticky note for cross cover information      Results:     Data   Recent Labs   Lab 10/02/20  1049   WBC 5.5   HGB 10.7*   MCV 90   PLT 95*      POTASSIUM 3.4   CHLORIDE 107   CO2 28   BUN 30   CR 1.37*   ANIONGAP 5   MK 7.4*   GLC 97   ALBUMIN 2.6*   PROTTOTAL 5.3*   BILITOTAL 0.7   ALKPHOS 62   ALT 22   AST 19   TROPI 0.059*     Recent Results (from the past 24 hour(s))   CT Head w/o Contrast    Narrative    CT head without contrast 10/2/2020 11:47 AM    History: Altered level of consciousness     Comparison: None    Technique: Using multidetector thin collimation helical acquisition  technique, axial, coronal and  sagittal CT images from the skull base  to the vertex were obtained without intravenous contrast.    Findings: There is no intracranial hemorrhage, mass effect, or midline  shift. Ventricles are proportionate to the cerebral sulci. The basal  cisterns are clear. Age-related volume loss. Periventricular and  subcortical hypodensities, likely chronic small vessel ischemic  disease. Chronic lacunar infarct in the right internal capsule. Cavum  septum pellucidum. Atherosclerotic calcifications of the basilar and  carotid arteries.    The bony calvaria and the bones of the skull base are normal. The  visualized portions of the paranasal sinuses and mastoid air cells are  clear.       Impression    Impression:  No acute intracranial pathology. Chronic small vessel ischemic disease  age-related cerebral volume loss.    I have personally reviewed the examination and initial interpretation  and I agree with the findings.    JAKE ARRIAGA MD

## 2020-10-02 NOTE — LETTER
Health Information Management Services               Recipient:    Mrs. Paulino, Admissions for [facility] facilities in [facility]      Sender:    ALEIDA Devine, [facility]  [phone]      Date: October 8, 2020  Patient Name:  Jatin Ayala  Routing Message:      Enclosed are the unsigned physician orders for Ms Ayala. Orders subject to change after discharge from [facility]      The documents accompanying this notice contain confidential information belonging to the sender.  This information is intended only for the use of the individual or entity named above.  The authorized recipient of this information is prohibited from disclosing this information to any other party and is required to destroy the information after its stated need has been fulfilled, unless otherwise required by state law.      If you are not the intended recipient, you are hereby notified that any disclosure, copy, distribution or action taken in reliance on the contents of these documents is strictly prohibited.  If you have received this document in error, please return it by fax to [phone] with a note on the cover sheet explaining why you are returning it (e.g. not your patient, not your provider, etc.).  If you need further assistance, please call [facility] Centralized Transcription at [phone].  Documents may also be returned by mail to [employer], , [facility], [place], [place] [address].

## 2020-10-02 NOTE — LETTER
Health Information Management Services               Recipient:    [facility]; attn: Sarah      Sender:    Hardy Mckoy MSW, [facility]  [phone]    Date: October 8, 2020  Patient Name:  Jatin Ayala  Routing Message:      Enclosed is the facesheet of pt for Hospice referral      The documents accompanying this notice contain confidential information belonging to the sender.  This information is intended only for the use of the individual or entity named above.  The authorized recipient of this information is prohibited from disclosing this information to any other party and is required to destroy the information after its stated need has been fulfilled, unless otherwise required by state law.      If you are not the intended recipient, you are hereby notified that any disclosure, copy, distribution or action taken in reliance on the contents of these documents is strictly prohibited.  If you have received this document in error, please return it by fax to [phone] with a note on the cover sheet explaining why you are returning it (e.g. not your patient, not your provider, etc.).  If you need further assistance, please call [facility] Centralized Transcription at [phone].  Documents may also be returned by mail to [employer], , [facility] [address], [place], [place] [address].

## 2020-10-02 NOTE — ED NOTES
[facility]    ED Nurse to Floor Handoff     Jatin Ayala is a 89 year old female who speaks Spanish and lives with others,  in a nursing home  They arrived in the ED by ambulance from home    ED Chief Complaint: Nausea & Vomiting    ED Dx;   Final diagnoses:   Altered mental status, unspecified altered mental status type   Elevated troponin         Needed?: No. Bilingual, speaks Spanish in addition to English.     Allergies: No Known Allergies.  Past Medical Hx:   Past Medical History:   Diagnosis Date     CKD (chronic kidney disease) stage 3, GFR 30-59 ml/min      Gastroesophageal reflux disease      Hypertension       Baseline Mental status: WDL  Current Mental Status changes: at basesline    Infection present or suspected this encounter: cultures pending  Sepsis suspected: No  Isolation type: No active isolations     Activity level - Baseline/Home:  Independent  Activity Level - Current:   Stand with Assist    Bariatric equipment needed?: No    In the ED these meds were given:   Medications   ondansetron (ZOFRAN) injection 4 mg (has no administration in time range)   0.9% sodium chloride BOLUS (0 mLs Intravenous Stopped 10/2/20 1125)     Followed by   sodium chloride 0.9% infusion ( Intravenous New Bag 10/2/20 1126)       Drips running?  Yes Maintanence fluids.     Home pump  No    Current LDAs  Peripheral IV 10/02/20 Left Lower forearm (Active)   Number of days: 0       Wound 09/23/20 Anterior;Left Toe (Comment  which one) Ulceration healed (Active)   Number of days: 9       Labs results:   Labs Ordered and Resulted from Time of ED Arrival Up to the Time of Departure from the ED   CBC WITH PLATELETS DIFFERENTIAL - Abnormal; Notable for the following components:       Result Value    RBC Count 3.73 (*)     Hemoglobin 10.7 (*)     Hematocrit 33.7 (*)     RDW 16.7 (*)     Platelet Count 95 (*)     All other components within normal limits   COMPREHENSIVE  METABOLIC PANEL - Abnormal; Notable for the following components:    Creatinine 1.37 (*)     GFR Estimate 33 (*)     GFR Estimate If Black 38 (*)     Calcium 7.4 (*)     Albumin 2.6 (*)     Protein Total 5.3 (*)     All other components within normal limits   TROPONIN I - Abnormal; Notable for the following components:    Troponin I ES 0.059 (*)     All other components within normal limits   MAGNESIUM - Abnormal; Notable for the following components:    Magnesium 1.3 (*)     All other components within normal limits   TSH WITH FREE T4 REFLEX   UA MACROSCOPIC WITH REFLEX TO MICRO AND CULTURE   CRP INFLAMMATION       Imaging Studies:   Recent Results (from the past 24 hour(s))   CT Head w/o Contrast    Narrative    CT head without contrast 10/2/2020 11:47 AM    History: Altered level of consciousness     Comparison: None    Technique: Using multidetector thin collimation helical acquisition  technique, axial, coronal and sagittal CT images from the skull base  to the vertex were obtained without intravenous contrast.    Findings: There is no intracranial hemorrhage, mass effect, or midline  shift. Ventricles are proportionate to the cerebral sulci. The basal  cisterns are clear. Age-related volume loss. Periventricular and  subcortical hypodensities, likely chronic small vessel ischemic  disease. Chronic lacunar infarct in the right internal capsule. Cavum  septum pellucidum. Atherosclerotic calcifications of the basilar and  carotid arteries.    The bony calvaria and the bones of the skull base are normal. The  visualized portions of the paranasal sinuses and mastoid air cells are  clear.       Impression    Impression:  No acute intracranial pathology. Chronic small vessel ischemic disease  age-related cerebral volume loss.    I have personally reviewed the examination and initial interpretation  and I agree with the findings.    JAKE ARRIAGA MD       Recent vital signs:   /79   Pulse 81   Temp 97.5  F  (36.4  C) (Oral)   Resp 18   SpO2 100%     Nicholas Coma Scale Score: 14 (10/02/20 1041)       Cardiac Rhythm: Normal Sinus  Pt needs tele? No  Skin/wound Issues: None    Code Status: DNR/DNI    Pain control: good    Nausea control: good    Abnormal labs/tests/findings requiring intervention: See results     Family present during ED course? No   Family Comments/Social Situation comments: Pt hears best in left ear with pocket talker. Please talk slowly.     Tasks needing completion: None    Eladia Huffman, RN  [phone] [facility]

## 2020-10-02 NOTE — LETTER
Health Information Management Services               Recipient:    [facility] admissions; attn: Mrs. Dexter      Sender:    ALEIDA Devine, [facility]  [phone]    Date: October 8, 2020  Patient Name:  Jatin Ayala  Routing Message:      Enclosed are physician orders with Hospice orders included      The documents accompanying this notice contain confidential information belonging to the sender.  This information is intended only for the use of the individual or entity named above.  The authorized recipient of this information is prohibited from disclosing this information to any other party and is required to destroy the information after its stated need has been fulfilled, unless otherwise required by state law.      If you are not the intended recipient, you are hereby notified that any disclosure, copy, distribution or action taken in reliance on the contents of these documents is strictly prohibited.  If you have received this document in error, please return it by fax to [phone] with a note on the cover sheet explaining why you are returning it (e.g. not your patient, not your provider, etc.).  If you need further assistance, please call [facility] Centralized Transcription at [phone].  Documents may also be returned by mail to [employer], , [facility] [place], [place] [address].

## 2020-10-02 NOTE — PHARMACY
Pharmacy Delirium Chart Review    Upon chart review, the following medications may contribute to possible patient delirium:   -Famotidine  -Oxycodone  -Sertraline

## 2020-10-03 NOTE — PROGRESS NOTES
Reason for admission: Altered mental status  Admitted from: ED  Report received from: Liang, RN  2 RN skin assessment completed by: FRANCIS Baugh & FLAQUITA King RN  Bed Algorithm reevaluated: Yes  Was Pulsate ordered?: no  Belongings: Socks, keys, black mask

## 2020-10-03 NOTE — ED NOTES
Pt concerned that handbag is not with her. Patient informed she did not come in to ED with handbag, she came via ambulance and did not have handbag with her.

## 2020-10-03 NOTE — PLAN OF CARE
"Cares 2000 to 0700    BP (!) 160/74 (BP Location: Right arm)   Pulse 60   Temp 96.2  F (35.7  C) (Oral)   Resp 16   Wt 37.3 kg (82 lb 3.7 oz)   SpO2 95%   BMI 17.74 kg/m      VS: Hypertensive on RA  Neuros: Alert, disoriented to time & situation. Elim IRA. On bed alarms. Native Greenlandic speaking. Able to use call light. Tends to ramble.     Cardiac: WDL  Resp: No SOB reported. RA.   GI/: Voids in bedside commode, urgency & frequency to void. No BM this shift.  Nutrition: Reg, encouraged fluid intake  IV/Drains: L PIV infusing NS @125 ml/hr  Pain: Denies  Skin: Dry, flaky. Bruised toe on L foot.   Activity: Assist x1-2 w/gait belt. Weak, unsteady gait.   Labs: Will come at 9am to try again.     Events this shift: Pt was upset about the smell in the room, made repeated statements that she wanted to go downstairs to her \"old\" room or wanted writer's room. Writer explained to her that the ED was full and that writer did not have a room here at the hospital. Then pt stated that she wanted to go home. This continued for ~1 hr. Finally pt was able to relax, started apologizing for being difficult, and has been calm since.     Mg+ replaced this shift.     Continue to monitor pt and update MD with any changes.       "

## 2020-10-03 NOTE — PROGRESS NOTES
[facility] Progress Note    Main Plans for Today   - Vitamin levels and replacement   - Delirium precautions  - Cross taper zoloft and mirtazapine    Assessment & Plan   Diana is a 89 year old female admitted on 10/2/2020. She has a history of history of CKD, GERD, HTN, Osteomyelitis on doxy/levo with 10/7 end date, recent hospitalization for pre-renal DALILA and is admitted for delirium and elevated cardiac enzymes.     # Elevated troponin, improved  # Anterolateral infarct of unknown age  Troponin 0.059 on admit, with T wave inversions in manjit-lateral leads on EKG, new since 2016. Unclear duration. Pt denies chest pain and shortness of breath on admission and is not hypoxic or tachycardic. Case was discussed with cardiology by ED physician who recommended admission for troponin trend and echo. Troponin trending down as of 10/3 and echo normal so low suspicion for acute coronary syndrome at this time.  - Discontinue telemetry  - Monitor clinically     # Weakness  # Dementia  # Depression  # Deconditioning  Workup largely negative so far for acute causes of delirium. 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 Zoloft at 50 mg daily has not shown any effect per her daughters, and she is not eating much at all, so will plan to switch to mirtazapine via cross-taper plan. Also feel there is likely a large element of chronic malnutrition that could be contributing to her lack of desire to eat and recent weight loss. Will check vitamin levels and replace as well.  - Taper Zoloft to 25 mg daily  - Start Mirtazapine 15 mg at bedtime to help with mood and appetite  - Delirium precautions  - Replace electrolytes via protocols  - Vitamin/Mineral labs added on  - Replace Vitamin B, D, ADEK, calcium, multivitamin, zinc orally (also will need copper po supplementation on discharge  as this is not an option inpatient)  - Daily miralax  - D5 0.45% NS + K @ 100 mL/hr     # Osteomyelitis  Doing well on Doxy/Levo with end date of 10/7. Do not feel levo is contributing to delirium as she started this on 9/11. Exam of toe reassuring against worsening infection.  - Continue doxy and levo     # CKD III  Unclear Cr baseline, but likely around 1.2. Creatinine on admission was 1.37, from 1.36 on recent discharge. No change. Will continue to monitor.  - Daily BMP     # HTN  Was on three drug regimen prior to last admission and was admitted with hypotension so medications were held. Not currently on any medications and BP normal to slightly hypertensive at times.  - No anti-hypertensives at this point  - Continue to monitor BP     # GERD  - Continue pta H2 blocker and PPI    # Pain Assessment:  Current Pain Score 10/3/2020   Patient currently in pain? denies   Pain score (0-10) -   - Jatin is experiencing pain due to osteomyelitis. Pain management was discussed and the plan was created in a collaborative fashion.  Jatin's response to the current recommendations: unclear  - Please see the plan for pain management as documented above      Diet: Combination Diet Regular Diet Adult  Fluids: D5 0.45% NS + K at 100 mL/hr  DVT Prophylaxis: Pneumatic Compression Devices  Code Status: DNR / DNI    Disposition Plan   Expected discharge:  2 - 3 days, recommended to undecided level of care once mental status at baseline.Dispo:          Entered: Edi Funk 10/03/2020, 1:51 PM   Information in the above section will display in the discharge planner report.      The patient's care was discussed with the Attending Physician, Dr. Pham.    Edi Funk  [facility] Medicine: PGY-3  Pager: [phone]  Please see sticky note for cross cover information    Interval History    No acute overnights. Mild confusion overnight per nursing notes, but not overly delirious or agitated. Vitals  stable. Daughter in the room this morning and reports Diana is close to baseline but has had significant difficulties with increased care needs recently at her facility.    Physical Exam   Vital Signs: Temp: 96.2  F (35.7  C) Temp src: Oral BP: (!) 160/74 Pulse: 60   Resp: 16 SpO2: 95 % O2 Device: None (Room air)    Weight: 82 lbs 3.71 oz     Physical Exam  Constitutional:       General: She is not in acute distress.     Appearance: She is well-developed. She is not ill-appearing, toxic-appearing or diaphoretic.      Comments: Thin appearing   HENT:      Head: Normocephalic and atraumatic.      Nose: Nose normal.   Eyes:      General: No scleral icterus.     Extraocular Movements: Extraocular movements intact.   Neck:      Musculoskeletal: Normal range of motion.   Cardiovascular:      Rate and Rhythm: Normal rate and regular rhythm.      Heart sounds: Normal heart sounds. No murmur. No friction rub.   Pulmonary:      Effort: Pulmonary effort is normal. No respiratory distress.      Breath sounds: Normal breath sounds. No wheezing, rhonchi or rales.   Abdominal:      General: Bowel sounds are normal. There is no distension.      Palpations: Abdomen is soft.      Tenderness: There is no guarding or rebound.   Skin:     General: Skin is warm and dry.      Findings: Erythema (mild erythema on second toe of right foot, stable from previous) present.   Neurological:      General: No focal deficit present.      Mental Status: She is alert and oriented to person, place, and time.      Cranial Nerves: No cranial nerve deficit.      Comments: Hard of hearing, uses pocket talker for communication         Data   Recent Labs   Lab 10/03/20  0958 10/02/20  2010 10/02/20  1049   WBC 4.8  --  5.5   HGB 8.9*  --  10.7*   MCV 91  --  90   PLT 76*  --  95*     --  140   POTASSIUM 3.0*  --  3.4   CHLORIDE 115*  --  107   CO2 24  --  28   BUN 18  --  30   CR 0.87  --  1.37*   ANIONGAP 6  --  5   MK 5.5*  --  7.4*   GLC 71  --  97    ALBUMIN  --   --  2.6*   PROTTOTAL  --   --  5.3*   BILITOTAL  --   --  0.7   ALKPHOS  --   --  62   ALT  --   --  22   AST  --   --  19   TROPI  --  0.054* 0.059*     Medications     dextrose 5% and 0.45% NaCl + KCl 10 mEq/L         acetaminophen  1,000 mg Oral TID     calcium citrate  950 mg Oral BID     calcium gluconate  1 g Intravenous Once     cyanocobalamin  500 mcg Oral Daily     diclofenac  2 g Transdermal BID     doxycycline hyclate  100 mg Oral BID     famotidine  20 mg Oral QAM     levofloxacin  500 mg Oral Every Other Day     loratadine  10 mg Oral QPM     multivitamin CF FORMULA  1 capsule Oral Daily     multivitamin, therapeutic  1 tablet Oral Daily     pantoprazole  40 mg Oral QAM AC     polyethylene glycol  17 g Oral Daily     polyethylene glycol-propylene glycol  1 drop Both Eyes 4x Daily     sertraline  50 mg Oral Daily     sodium chloride (PF)  3 mL Intracatheter Q8H     vitamin B complex with vitamin C  1 tablet Oral Daily     zinc sulfate  220 mg Oral Daily

## 2020-10-03 NOTE — UTILIZATION REVIEW
[facility] Utilization Review  Admission Status; Secondary Review Determination     Admission Date: 10/2/2020 10:27 AM      Under the authority of the Utilization Management Committee, the utilization review process indicated a secondary review on the above patient.  The review outcome is based on review of the medical records, discussions with staff, and applying clinical experience noted on the date of the review.        (X)      Inpatient Status Appropriate - This patient's medical care is consistent with medical management for inpatient care and reasonable inpatient medical practice.          RATIONALE FOR DETERMINATION   89-year-old female with history of chronic kidney disease, GERD, hypertension, osteomyelitis on antibiotics, recent acute kidney injury, admitted with delirium and elevated troponins with T wave inversions in anterolateral leads.  No complaints of chest pain or shortness of breath.  Patient has severe hypocalcemia, hypokalemia, and on room with a dobutamine checking vitamin B12 and folate levels, delirium precautions, starting taper of Zoloft and plan to switch to mirtazapine.  Replacing electrolytes, started on IV fluid hydration with potassium.  Patient continues to have confusion, TTE shows EF of 55 to 60%, otherwise unremarkable.  Complex patient with delirium, multiple electrolyte abnormalities, acute kidney injury, elevated troponins, needs close monitoring in the hospital with ongoing interventions, anticipate more than 2 midnight hospital stay, recommend continue inpatient status      The severity of illness, intensity of service provided, expected LOS and risk for adverse outcome make the care complex, high risk and appropriate for hospital admission.The patient requires hospital based medical care which is anticipated to require a stay of 2 or more midnights.        The information on this document is developed by the utilization review team in order for the business  office to ensure compliance.  This only denotes the appropriateness of proper admission status and does not reflect the quality of care rendered.              Sincerely,       Ashleigh Craft MD  Physician Advisor  Utilization Review-[place]    Phone: [phone]

## 2020-10-03 NOTE — PLAN OF CARE
Cares 1172-2435. Alert, oriented to self only. [place], uses hearing aids. VSS on RA. Bulgarian and English speaking. Voiding adequately at bedside commode. Denies pain/nausea. Put in second PIV d/t many IV meds due at once, now no longer needed and it was leaking, so writer pulled this shift. PIV infusing D5 1/2 NS with KCl. Potassium and calcium replaced this shift. Continue to monitor.

## 2020-10-04 NOTE — PROGRESS NOTES
[facility] Progress Note    Main Plans for Today   Continues to be confused and perseverating over message she's receiving from [employer] about how to save everyone from covid. Poor appetite. Appears to be refeeding. Hypophosphatemia (1.4). Hypocalcemia (iCal 4.1). . Stable/slightly improved thrombocytopenia (93). Normocytic anemia (10.5, MCV 89).       - Replete phos per protocol first. Once phos WNL, can replace calcium.  - Replete magnesium per protocol.   - Vit D, Vit B1, Vit B2, Zinc, Copper pending  - Peripheral smear pending  - Nutrition consult; discuss tube feeds vs IV feeds w/ family  - Delirium precautions  - Cross taper zoloft and mirtazapine    Assessment & Plan   Diana is a 89 year old female admitted on 10/2/2020. She has a history of history of CKD, GERD, HTN, Osteomyelitis on doxy/levo with 10/7 end date, recent hospitalization for pre-renal DALILA and is admitted for delirium and elevated cardiac enzymes.      # Refeeding  # Failure to thrive and malnutrition, 10lb weight loss within past month per NH records seen in Media Section  # At risk for Wernicke's Encephalopathy  # Partially edentulous  Patient's appetite has been poor for past couple of months with minimal oral intake, worsened by nausea/vomiting a/w taking antibiotics (not w/ every meal, but with most). Very picky eater. Poor dentition (edentulous bottom row d/t covid-related delays in denture fittings).    - Thiamine per Wernicke's protocol   - Replace electrolytes via protocols  - Vit D, Vit B1, Vit B2, Zinc, Copper pending  - Nutrition consulted re PPN. They're advising TF. Need to discuss w/ family.   - Replace Vitamin B, D, ADEK, calcium, multivitamin, zinc orally (Will need copper po supplementation on discharge [not available inpt].)  - Daily miralax  - D5 0.45% NS + K @ 100 mL/hr  - Defer telemetry d/t risk of worsening delirium. Spot EKGs PRN.   -  Need further goals of care conversations with family     # Hypophosphatemia  Related to refeeding. Needs to be corrected (per protocol) prior to correcting hypocalcemia.     # Hypocalcemia   # Hyperparathyroidism  Suspect secondary hyperparathyroidism d/t hypocalcemia. Could obtain 24 hour urine calcium to look for familial hypercalcemic hypercalciuria; however, more likely that hypocalcemia is r/t malnutrition and chronically poor oral intake + CKD, thus will defer at this time.   - Vit D pending  - Replacing phos first, then will replace calcium     # Thrombocytopenia  # Normocytic anemia  Most likely d/t hypoproliferation in context of severe malnutrition. Peripheral smear obtained to assess for true vs false thrombocytopenia. Stable at this time. Does have history of positive FITT test, but CTAP during September admission w/o any signs concerning for colon cancer, so much less likely. Pursue further work-up tomorrow. Continue to trend.     # Dementia and depression  Initially admitted 10/2 for delirium. Work-up has been largely negative, except for profound malnutrition. Have discussed daughters who agree that this is likely pseudodementia on her chroinc dementia. Zoloft at 50 mg daily has not shown any benefit (per her daughters), and she is not eating much at all. On 10/2, started cross taper off of zoloft and onto mirtazapine.   - Zoloft to 25 mg daily  - Mirtazapine 15 mg at bedtime to help with mood and appetite  - Delirium precautions     # Elevated troponin, stable/improved  # Anterolateral infarct of unknown age  Troponin 0.059 on admit, with T wave inversions in manjit-lateral leads on EKG, new since 2016. Unclear duration. Pt denied chest pain and shortness of breath on admission and is not hypoxic or tachycardic. Case was discussed with cardiology by ED physician who recommended admission for troponin trend and echo. Troponin trending down as of 10/3 and echo normal so low suspicion for acute coronary  syndrome at this time. Monitor clinically.     Chronic:   - Osteomyelitis Doing well on Doxy/Levo with end date of 10/7. Do not feel levo is contributing to delirium as she started this on 9/11. Infection stable. Continue doxy and levo. RN to please wrap toe osteo w/ lamb's wool daily.     - CKD III: Baseline ~1.2 (but unclear). Creatinine on admission was 1.37, from 1.36 on recent discharge. 1.03 on 10/4. Daily BMP.     - HTN: Was on three drug regimen prior to last admission and was admitted with hypotension so medications were held. Not currently on any medications and BP normal to slightly hypertensive at times. Continue to monitor BP. Permissive hypertension to ~150/90.      - GERD: Continue pta H2 blocker and PPI    Diet: Combination Diet Regular Diet Adult  Calorie Counts  Snacks/Supplements Adult: Other; Boost Plus @ 10, Boost Breeze @ 2, and Gelatein @ HS. Pt may prefer oral supplements at room temperature.; Between Meals  Fluids: D5 0.45% NS + K at 100 mL/hr  DVT Prophylaxis: Pneumatic Compression Devices  Code Status: DNR / DNI    Disposition Plan   Expected discharge:  2 - 3 days, recommended to undecided level of care once mental status at baseline.Dispo:          Entered: Jesica Ayala 10/04/2020, 5:30 PM   Information in the above section will display in the discharge planner report.      The patient's care was discussed with the Attending Physician, Dr. Pham.    Jesica Ayala  [facility]: PGY-3  Pager: [phone]  Please see sticky note for cross cover information    Interval History    Continues to be confused and perseverating over message she's receiving from [place] [employer] about how to save everyone from covid. Poor appetite. Appears to be refeeding. Hypophosphatemia (1.4). Hypocalcemia (iCal 4.1). . Stable/slightly improved thrombocytopenia (93). Normocytic anemia (10.5, MCV 89). Daughter at bedside. Explains mom has always been a fighter, but  "now it doesn't seem so much anymore, and \"maybe she is ready to go.\" Afebrile. Pt denying pain, except at toe and wanting it wrapped (which we relayed to nurse).     Physical Exam   Vital Signs: Temp: 97  F (36.1  C) Temp src: Axillary BP: 137/76 Pulse: 70   Resp: 16 SpO2: 100 % O2 Device: None (Room air)    Weight: 81 lbs 9.12 oz     Sitting upright (propped forward) in bed. Fidgeting occasionally. Talking at times appropriately, and other times non-sensically about the [place] [employer] and the messages she is receiving to share with all to save us from corona virus.   Thin.   Lesion on 2nd toe (osteo) right foot appears stable.   Abdomen thin and soft.   No LE edema.   Non-toxic.       Data   Results for orders placed or performed during the hospital encounter of 10/02/20 (from the past 24 hour(s))   Potassium   Result Value Ref Range    Potassium 5.1 3.4 - 5.3 mmol/L   Glucose by meter   Result Value Ref Range    Glucose 152 (H) 70 - 99 mg/dL   Basic metabolic panel   Result Value Ref Range    Sodium 137 133 - 144 mmol/L    Potassium 3.6 3.4 - 5.3 mmol/L    Chloride 106 94 - 109 mmol/L    Carbon Dioxide 25 20 - 32 mmol/L    Anion Gap 5 3 - 14 mmol/L    Glucose 118 (H) 70 - 99 mg/dL    Urea Nitrogen 14 7 - 30 mg/dL    Creatinine 1.03 0.52 - 1.04 mg/dL    GFR Estimate 47 (L) >60 mL/min/[1.73_m2]    GFR Estimate If Black 54 (L) >60 mL/min/[1.73_m2]    Calcium 6.8 (L) 8.5 - 10.1 mg/dL   CBC with platelets differential   Result Value Ref Range    WBC 5.3 4.0 - 11.0 10e9/L    RBC Count 3.66 (L) 3.8 - 5.2 10e12/L    Hemoglobin 10.5 (L) 11.7 - 15.7 g/dL    Hematocrit 32.5 (L) 35.0 - 47.0 %    MCV 89 78 - 100 fl    MCH 28.7 26.5 - 33.0 pg    MCHC 32.3 31.5 - 36.5 g/dL    RDW 16.6 (H) 10.0 - 15.0 %    Platelet Count 93 (L) 150 - 450 10e9/L    Diff Method Automated Method     % Neutrophils 52.8 %    % Lymphocytes 34.4 %    % Monocytes 9.5 %    % Eosinophils 2.3 %    % Basophils 0.4 %    % Immature Granulocytes 0.6 %    Nucleated " RBCs 0 0 /100    Absolute Neutrophil 2.8 1.6 - 8.3 10e9/L    Absolute Lymphocytes 1.8 0.8 - 5.3 10e9/L    Absolute Monocytes 0.5 0.0 - 1.3 10e9/L    Absolute Eosinophils 0.1 0.0 - 0.7 10e9/L    Absolute Basophils 0.0 0.0 - 0.2 10e9/L    Abs Immature Granulocytes 0.0 0 - 0.4 10e9/L    Absolute Nucleated RBC 0.0    Magnesium   Result Value Ref Range    Magnesium 1.7 1.6 - 2.3 mg/dL   Phosphorus   Result Value Ref Range    Phosphorus 1.4 (L) 2.5 - 4.5 mg/dL   Calcium ionized whole blood   Result Value Ref Range    Calcium Ionized Whole Blood 4.1 (L) 4.4 - 5.2 mg/dL   Parathyroid Hormone Intact   Result Value Ref Range    Parathyroid Hormone Intact 159 (H) 18 - 80 pg/mL   Reticulocyte count   Result Value Ref Range    % Retic 1.0 0.5 - 2.0 %    Absolute Retic 35.4 25 - 95 10e9/L       Medications     dextrose 5% and 0.45% NaCl + KCl 10 mEq/L Stopped (10/04/20 1500)       acetaminophen  1,000 mg Oral TID     calcium citrate  950 mg Oral BID     cholecalciferol  1,250 mcg Oral Q7 Days     cyanocobalamin  500 mcg Oral Daily     diclofenac  2 g Transdermal BID     doxycycline hyclate  100 mg Oral BID     famotidine  20 mg Oral QAM     levofloxacin  500 mg Oral Every Other Day     loratadine  10 mg Oral QPM     mirtazapine  7.5 mg Orally disintegrating tablet At Bedtime     multivitamin CF FORMULA  1 capsule Oral Daily     multivitamin, therapeutic  1 tablet Oral Daily     pantoprazole  40 mg Oral QAM AC     polyethylene glycol  17 g Oral Daily     polyethylene glycol-propylene glycol  1 drop Both Eyes 4x Daily     sertraline  25 mg Oral Daily     sodium chloride (PF)  3 mL Intracatheter Q8H     thiamine  500 mg Intravenous TID    Followed by     [START ON 10/7/2020] thiamine  250 mg Intravenous Daily    Followed by     [START ON 10/12/2020] thiamine  100 mg Oral Daily     vitamin B complex with vitamin C  1 tablet Oral Daily     zinc sulfate  220 mg Oral Daily

## 2020-10-04 NOTE — PROGRESS NOTES
"CLINICAL NUTRITION SERVICES - ASSESSMENT NOTE     Nutrition Prescription    RECOMMENDATIONS FOR MDs/PROVIDERS TO ORDER:  1. Rec feeding tube placement with bridle and TFs rather than parenteral nutrition if in line with pt's goal of care: If pt is a candidate for TFs, would rec place feeding tube (FT) and initiate TFs, Nutren 1.5 (maintenance TF formula), with goal rate of 35 mL/hr to provide approximately 1260 kcals (34 kcal/kg/day), 57 g PRO (1.7 g/kg/day), 638 mL H2O, 148 g CHO and no Fiber daily. Initiate TFs at 10 mL/hr, advancing rate by 10 mL Q 8 hrs (or per provider discretion, pending hemodynamic stability) to goal rate of 35 mL/hr. If phos trends less than 2 or if Mg++/Phos trend low, then rec keep TFs at a rate of 15 mL/hr until lytes are within these parameters before further advancing. Change IVFs to non-dextrose containing if/when starting TFs. If/when TFs start, TFs will need to be held for one hour before and one hour after PO levaquin doses if levaquin continues (ordered every other day at this time).       If begin tube feeds:    - Flush FT with 30 mL water Q 4 hrs for patency. Rec provider adjust free water flushes as needed, pending fluid status.   - Ensure K+/Mg++/Phos labs are ordered daily until TFs advance to goal infusion to evaluate for sx of refeeding with nutrition received. If lytes trend low, aggressively replace lytes.       - If not already ordered, order a multivitamin/mineral (certavite 15 mL/day via FT) to help ensure micronutrient needs being met with suspected hypermetabolic demands and potential interruptions to TF infusions.   - Monitor TF and possible need to adjust nutrition support regimen if necessary, pending medical course and nutrition status.       - Monitor need to check a metabolic cart study.    - Send a nutrition consult for \"Registered Dietitian to Order TF per Medical Nutrition Therapy Guidelines\" if desire RD to order TFs.   2. Monitor lytes (Phos, Mg++, and K+) " "for refeeding syndrome, pt is at high risk. If lytes trend low, aggressively replace (phos is 1.7, RN is replacing). Note, negative urinary ketones per urinalysis. Ensure the Rx Electrolyte Replacement Management Adult order set is implemented and select the option for high replacement.  3. Modify multivitamin to a multivitamin with minerals. Order thiamine, 100 mg/day, for up to seven days.      Malnutrition Status:    Unable to determine due to unable to complete NFPA to limit exposure during COVID-19 pandemic. Pt at risk for malnutrition, minimally.    Recommendations already ordered by Registered Dietitian (RD):  Kcal counts, oral supplements    Future/Additional Recommendations:  1. Diet order as per team. Regular diet order is liberalized to help encourage oral intake. Consult SLP if dysphagia noted.   2. Continue bowel regimen as per team.   3. Check vitamin D status to assess potential need to adjust vitamin D supplementation. Pt's vitamin D deficiency lab was 60 (WNL) when checked on 11/8/19.   4. Continue remeron as per team, to help potentially encourage oral intake.   5. Consider scheduling antiemetics/antinauseants ~20 minutes prior to meals to help optimize nausea control.   6. Consider checking zinc status as ordered to take zinc supplementation since 10/3 this admission. Long-term zinc supplementation can lead to copper deficiency.    7. Consider checking niacin and pyridoxine labs.  8. Monitor BG control. Hgb A1c was 6.1 on 3/22/06. BG was 118 on 10/4/20.   9. If feeding tube placement is contraindicated and if IV nutrition is within goals of care, place \"Pharmacy/Nutrition to start & manage TPN\" order. Dosing wt: 37 kg, 720 mL central parenteral nutrition/day of 75 g dex, 55 g AA, and 180 mL of 20% IV lipids five times weekly. If Phos is 2 or greater, K+/Mg++ WNL, and glycemic control acceptable, increase dextrose by 25 g/day to goal dextrose of 140 g/day. This provides 954 kcals (26 kcals/kg), 55 " "g protein (1.5 g protein/kg), 140 g CHO, GIR 2.6, and 27% of kcals from fat on average daily. Check a baseline triglyceride lab and then weekly. Adjust IVFs to non-dextrose-containing before starting parenteral nutrition. Rec infuvite/MTE-5. If pt needs peripheral parenteral nutrition (PPN), start PPN at 10 mL/hr, adv by 10 mL Q 8 hrs (If Phos is 2 or greater, K+/Mg++ WNL, and glycemic control acceptable) to goal PPN of ~65 mL/hr = 1560 mL/day PPN (pending volume per PharmD)/day of goal 110 g dextrose, 55 g AA, and 180 mL of 20% IV lipids daily. This provides 954 kcals (26 kcals/kg), 55 g protein (1.5 g protein/kg), 110 g CHO, GIR 2.1, and 38% of kcals from fat on average daily. Check a baseline triglyceride lab and then weekly. Adjust IVFs to non-dextrose-containing before starting parenteral nutrition. Rec infuvite/MTE-5.     REASON FOR ASSESSMENT  Jatin Ayala is a/an 89 year old female assessed by the dietitian for received Provider order for \"failure to thrive, assistance w/ starting PPN and converting PO supplements to IV.\" Admission nutrition risk screen is pending.     NUTRITION/ADDITIONAL HISTORY  Pt not known to this service PTA.   Per H & P, \"Per H & P,  History of CKD, GERD, HTN, Osteomyelitis on doxy/levo with 10/7 end date, recent hospitalization for pre-renal DALILA and is admitted for weakness and acute cardiac syndrome rule out. Pt was recently discharged on 9/25 from our service for an DALILA back to her assisted living facility. She was doing well there for a couple days until 2-3 days ago when she became more weak and confused, needing more assists for transfers. Her daughters confirm that she has not been eating much at all either.  N/V noted PTA. Vitamin D deficiency hx. Pt was ordered to take, noting, ginkgo biloba, Pepcid, prevacid, Levaquin, Ensure (1 bottle three times daily), miralax, and potassium PTA.  Unable to reach pt/family, attempts x2.     CURRENT NUTRITION ORDERS  Diet: Regular diet " "  Intake/Tolerance: Poor diet tolerance. Per RN, \"Does not like cold drinks. Drank ginger ale, warm water & warm milk with meds. Pt does not want meds all together, wants it spaced out as it's easier for her stomach.\"     LABS  Labs reviewed    MEDICATIONS  Medications reviewed    ANTHROPOMETRICS  Height: 145 cm (4' 9.09\")    Most Recent Weight: 37 kg (81 lb 9.1 oz)    IBW: 42 kg   BMI: Underweight BMI <18.5  Weight History: 53.5 kg (2/13/16), 45.1 kg (11/8/19), 45.2 kg (3/6/20), 45.8 kg (9/18/19, outside records), 39.9 kg (9/23/20) - Pt has lost 7.3% of her body wt within the last month.   Dosing Weight: 37 kg (based on lowest wt so far this admission of 37 kg on 10/3)    ASSESSED NUTRITION NEEDS  Estimated Energy Needs: 9942-2982 kcals/day (30 - 35 kcals/kg)  Justification: Increased needs. If receives parenteral nutrition, 25-30 kcals/kg is appropriate.  Estimated Protein Needs: 44-56 grams protein/day (1.2 - 1.5 grams of pro/kg)  Justification: Increased needs, pending renal function  Estimated Fluid Needs:  925-1110 mL/day (25 - 30 mL/kg)   Justification: Maintenance needs or per team, pending fluid status    PHYSICAL FINDINGS/OTHER FINDINGS  See malnutrition section below.  Resp: No O2  GI: Unknown date of last stool.  General: Thin appearing, per H & P  Neuro: Per RN, \"Alert, disoriented to time & situation. [place]. On bed alarms. Native Urdu speaking. Uses call light.\"     MALNUTRITION  % Intake: Unable to assess, but suspect meeting. Unable to reach pt/family.  % Weight Loss: > 5% in 1 month (severe)  Subcutaneous Fat Loss: Unable to determine due to unable to complete NFPA to limit exposure during COVID-19 pandemic    Muscle Loss: Unable to determine due to unable to complete NFPA to limit exposure during COVID-19 pandemic    Fluid Accumulation/Edema: None noted, per chart.   Malnutrition Diagnosis: Unable to determine due to unable to complete NFPA to limit exposure during COVID-19 pandemic. Pt at risk for " malnutrition, minimally.    NUTRITION DIAGNOSIS  Inadequate protein-energy intake related to lack of appetite, N/V as evidenced by pt has lost 7.3% of her body wt over less than the past month.       INTERVENTIONS  Implementation  Nutrition Education: Not appropriate at this time due to patient condition, pt delirious.   Collaboration with other providers: Paged team twice to rec feeding tube placement w/ bridle and TFs rather than parenteral nutrition if in line with pt's goal of care. Discussed low phos with RN who is aware of low phos and has been replacing. Notified RN of nutrition support recs in note.    Kcal counts ordered for 10/5-10/7  Medical food supplement therapy: Oral supplements ordered to trial, Boost Plus at 10:00, Boost Breeze at 14:00, and Gelatein at HS. Pt may prefer room temperature oral supplements.    Goals  Total avg nutritional intake to meet a minimum of 25-30 kcal/kg and 1.2 g PRO/kg daily (per dosing wt 37 kg).     Monitoring/Evaluation  Progress toward goals will be monitored and evaluated per protocol.      Nutrition will continue to follow.     Meghan Butler, MS, RD, LD, CNSC   Saturday/Sunday Pgr: [phone]      5B/5A RD pager: [phone]

## 2020-10-04 NOTE — PLAN OF CARE
Cares 1900 to 0700    BP (!) 154/78 (BP Location: Right arm)   Pulse 65   Temp 98.1  F (36.7  C) (Oral)   Resp 16   Wt 37 kg (81 lb 9.1 oz)   SpO2 100%   BMI 17.60 kg/m      VSS, Hypertensive on RA  Neuros: Alert, disoriented to time & situation. [place]. On bed alarms. Native Frisian speaking. Uses call light.    Cardiac: WDL  Resp: No SOB reported. RA.   GI/: Uses bedside commode, urine incontinence, urgency & frequency to void. No BM this shift.  Nutrition: Reg, fluid intake encouraged  IV/Drains: L PIV infusing 1/2 NS, D5 & KCl @ 100ml/hr.   Pain: Denies  Skin: Dry, flaky. Bruised toe on L foot.   Activity: Assist x1 to commode. Weak, unsteady gait.     Events this shift: Frequent void to bedside commode. Uneventful shift. Does not like cold drinks. Drank ginger ale, warm water & warm milk with meds. Pt does not want meds all together, wants it spaced out as it's easier for her stomach.      Continue to monitor pt and update MD with any changes.

## 2020-10-04 NOTE — PLAN OF CARE
"Cares 6819-9800. Pt alert, oriented x4 this shift. VSS on RA. [place], uses hearing aid in L ear. Croatian and English speaking. Voiding adequately at bedside commode. Denies pain/nausea. PIV infusing D5 1/2 NS with KCl. Phos replaced this shift. [place], states she is here for \"Lutheran sickness\" and repeatedly talks about her doctor, a \"male doctor, I saw him today.\" Pt received abrasion this shift on lower left back while transferring with gait belt, writer added to LDA and covered with mepilex. Continue to monitor.   "

## 2020-10-04 NOTE — PROGRESS NOTES
Care Management Initial Consult    General Information:  Patient's communication style: spoken language (non-English)  Hearing Difficulty or Deaf: yes Wear Glasses or Blind: no  Cognitive/Neuro/Behavioral: WDL  Level of Consciousness: alert, confused  Arousal Level: opens eyes spontaneously  Orientation: disoriented to, time, situation  Speech: garbled  Mood/Behavior: calm, cooperative  Advance Care Planning:         Living Environment:   People in home:    Current living Arrangements:            Family/Social Support:  Care provided by:    Provides care for:    Description of Support System:            Lifestyle & Psychosocial Needs:        Socioeconomic History     Marital status:      Spouse name: Not on file     Number of children: Not on file     Years of education: Not on file     Highest education level: Not on file     Tobacco Use     Smoking status: Never Smoker     Smokeless tobacco: Never Used   Substance and Sexual Activity     Alcohol use: Not Currently     Drug use: Not Currently     Sexual activity: Not Currently       Functional Status:  Prior to admission patient needed assistance:          Current Resources:   Skilled Home Care Services:    Community Resources:    Equipment currently used at home:    Supplies currently used at home:      Employment:  Employment Status:       Financial/Environmental Concerns:         Values/Beliefs:  Spiritual, Cultural Beliefs, Scientology Practices, Values that affect care:                 Additional Information:    LILIYA was asked to contact patients daughter about living arrangements. LILIYA spoke with patients daughter, Sharri, [phone], and she stated the family feels she needs higher level of care. She stated that the AL she is living at right now ([facility] [phone] within [facility]) can't provide the higher level of services she needs right now. Sharri stated that the assisted living staff helped significantly, but they feel as a family  she needs higher level of care. Prior to hospitalizations, assisted living helped with medication management, checking vitals, laundry, cleaning, meals. Per Sharri, patient had lifeline and would hit the button up to 20x/day for assistance from assisted living staff.     Sharri reported they are concerned about her memory and worsening dementia.  MD mentioned to Sharri about [facility] that has a memory care unit.    SW will ask weekday SW to follow up.     [place], ALEIDA, [facility]  Social Work Services/Care Management, Casual staff  [facility]      For weekend social work needs, contact information below and avail on [employer]:  4A, 4C, 4E, 5A, 5B pager [phone]  6A, 6B, 6C, 6D  pager [phone]  7A, 7B, 7C, 7D, 5C pager [phone]  on-call/after hours pager [phone]    weekend RN care coordinator pager [phone] (ID: [id number])  (home d/c with needs incl home care, assisted living facility returns, durable medical equip, IV antibiotics)

## 2020-10-05 NOTE — PROGRESS NOTES
"[facility] Family Medicine Progress Note    Main Plans for Today   Severe malnutrition w/ very poor appetite. Appears to be refeeding. Low total body nutrient stores w/ poor bone marrow production, as reflected by thrombocytopenia and anemia.     - Discussion at bedside w/ daughter, Kat and Diana re goals moving forward. Plan on further goals of care conversation 10/6 w/ Diana's 2 other daughters calling in, around 1300   - Will ask SW for assistance in evaluating for hospice eligibility and for placement, accordingly (to provide family with some options--they are open and want more info)   - Monitor refeeding. Scheduled phos check this evening. Replete Mg/K/Phos per protocol.  - 1g calcium gluconate.  - Vit D, Vit B1, Vit B2, Zinc, Copper pending  - Peripheral smear pending  - Delirium precautions  - Cross taper zoloft and mirtazapine  - Decrease D5HNS rate to 50mL/hr    Assessment & Plan   Diana is a 89 year old female admitted on 10/2/2020. She has a history of history of CKD, GERD, HTN, Osteomyelitis on doxy/levo with 10/7 end date, recent hospitalization for pre-renal DALILA and is admitted for delirium and elevated cardiac enzymes.     # Severe malnutrition w/ BMI 17.6   # Chronic osteomyelitis   # Dementia   # CKD Stage III  # Failure to thrive   Briefly discussed overall prognosis w/ above factors in mind w/ daughter, Kat. Discussed how 1 year ago, Diana was relatively independent, and she is clearly in a different place now. Profoundly malnourished with very low likelihood of returning to \"baseline\" from even ~2 months ago. Diana w/ delusions at this time and repeatedly asks to get message off of chest so she can \"go home,\" which daughter interprets as patient ready to die. Kat open to hospice, but wants more info and to discuss w/ siblings.   - Discussion w/ Diana and 3 daughters on 10/6 around 1300   - SW to assist in presenting hospice resources  - " Consider palliative consult  - Clarified code status w/ Kat, which is DNR/DNI (as indicated in orders)    # Severe malnutrition w/ BMI 17.6, 10lb weight loss within past month per NH records seen in Media Section (91.8lbs on 9/1/2020; 80 lb on 9/21/2020), temporal wasting, decreased bone marrow production, evidenced by thrombocytopenia and anemia   # Refeeding, evidenced by rapid consumption of phos  # Failure to thrive  # At risk for Wernicke's Encephalopathy  Patient's appetite has been poor for past couple of months with minimal oral intake, worsened by nausea/vomiting a/w taking antibiotics (not w/ every meal, but with most). Very picky eater. Poor dentition (edentulous bottom row d/t covid-related delays in denture fittings).    - Thiamine per Wernicke's protocol   - Replace electrolytes via protocols.   - Vit D, Vit B1, Vit B2, Zinc, Copper pending  - Replace Vitamin B, D, ADEK, calcium, multivitamin, zinc orally (Will need copper po supplementation on discharge [not available inpt].)  - Nutrition consulted re PPN on 10/4. They're advising TF. Initiated conversation with Kat on 10/5, but defer remainder to 10/6 to hold w/ other family members as well. Diana having modest oral intake today, but w/ emesis afterward.   - Per nutrition: consider scheduling anti-emetics/antinauseants ~20 min prior to meals  - Available PRN, difficult to schedule d/t variable eating times. Will ask RNs to please offer zofran prior to meals.   - Defer telemetry d/t risk of worsening delirium. Spot EKGs PRN.   - Need further goals of care conversations with family     # Secondary hyperparathyroidism   Suspect secondary hyperparathyroidism d/t hypocalcemia in context of severe malnutrition.   - Vit D pending  - iCal daily; replete w/ calcium gluconate PRN to keep >4 (Note: 1g dose usually sufficient for her)  - Anticipate calcium repletion may contribute to hypophosphatemia; but at this time okay to replete as phos is WNL.    #  Thrombocytopenia  # Normocytic anemia  Peripheral smear w/ normochromic normocytic anemia and thrombocytopenia w/o other morphologic abnormalities. Due to severe malnutrition c/b bone marrow hypoproliferation.  Retic count low. Stable at this time. Does have history of positive FITT test, but CTAP during September admission w/o any signs concerning for colon cancer, so much less likely. Continue to trend.     # Dementia and depression  Initially admitted 10/2 for delirium. Work-up has been largely negative, except for profound malnutrition. Have discussed daughters who agree that this is likely pseudodementia on her chroinc dementia. Zoloft at 50 mg daily has not shown any benefit (per her daughters), and she is not eating much at all. On 10/2, started cross taper off of zoloft and onto mirtazapine.   - Zoloft @ 25 mg daily  - Mirtazapine 15 mg at bedtime to help with mood and appetite  - Delirium precautions     # Elevated troponin, stable/improved  # Anterolateral infarct of unknown age  Troponin 0.059 on admit, with T wave inversions in manjit-lateral leads on EKG, new since 2016. Unclear duration. Pt denied chest pain and shortness of breath on admission and is not hypoxic or tachycardic. Case was discussed with cardiology by ED physician who recommended admission for troponin trend and echo. Troponin trending down as of 10/3 and echo normal so low suspicion for acute coronary syndrome at this time. Monitor clinically.     Chronic:   - Osteomyelitis Doing well on Doxy/Levo with end date of 10/7. Do not feel levo is contributing to delirium as she started this on 9/11. Infection stable. Continue doxy and levo. RN to please wrap toe osteo w/ lamb's wool daily.     - CKD III: Baseline ~1.2 (but unclear). Creatinine on admission was 1.37, from 1.36 on recent discharge. 1.03 on 10/4. Daily BMP.     - HTN: Was on three drug regimen prior to last admission and was admitted with hypotension so medications were held. Not  "currently on any medications and BP normal to slightly hypertensive at times. Continue to monitor BP. Permissive hypertension to ~150/90.      - GERD: Continue pta H2 blocker and PPI    Diet: Combination Diet Regular Diet Adult  Calorie Counts  Snacks/Supplements Adult: Other; Boost Plus @ 10, Boost Breeze @ 2, and Gelatein @ HS. Pt may prefer oral supplements at room temperature.; Between Meals  Fluids: D5 0.45% NS + K at 50 mL/hr  DVT Prophylaxis: Pneumatic Compression Devices  Code Status: DNR / DNI    Disposition Plan   Expected discharge:Expected Discharge Date: 10/09/20 2 - 3 days, recommended to undecided level of care once mental status at baseline.Expected Discharge Date: 10/09/20        Entered: Jesica Ayala 10/05/2020, 5:32 PM   Information in the above section will display in the discharge planner report.      The patient's care was discussed with the Attending Physician, Dr. Pham.    Jesica Ayala  [facility] Family Medicine: PGY-3  Pager: [phone]  Please see sticky note for cross cover information    Interval History    Continues to be confused and perseverating over message she's receiving from [place] [employer] about how to save everyone from covid--can't find the ED doc to share the message with, so shared with me instead. Per daughter, slightly more confused today. Some shortness of breath w/ exertion (chronic, slow, progressive, but she just told me today; see sepsis note). No cough, chest pain, fever. Appetite somewhat improved today. Had episode of emesis after eating.  Pt denying pain. Pt frustrated w/ so much commotion and \"just want some rest. Quiet.\"    Physical Exam   Vital Signs: Temp: 97.6  F (36.4  C) Temp src: Oral BP: 104/53 Pulse: 98   Resp: 18 SpO2: 100 % O2 Device: None (Room air)    Weight: 81 lbs 9.12 oz     Laying back in bed and appears comfortable.  Perseverating/delusional about saving the world from coronavirus, but needing to relay her message to " ER doctor.   Lungs clear. No cough.   Heart RRR w/o murmur.   Very thin. Emaciated w/ temporal wasting and hypothenar atrophy.   Lesion on 2nd toe (osteo) right foot appears stable.   Abdomen thin and soft.   No LE edema.   Non-toxic.       Data   Results for orders placed or performed during the hospital encounter of 10/02/20 (from the past 24 hour(s))   Glucose by meter   Result Value Ref Range    Glucose 98 70 - 99 mg/dL   Phosphorus   Result Value Ref Range    Phosphorus 4.1 2.5 - 4.5 mg/dL   CBC with platelets differential   Result Value Ref Range    WBC 5.5 4.0 - 11.0 10e9/L    RBC Count 3.49 (L) 3.8 - 5.2 10e12/L    Hemoglobin 10.1 (L) 11.7 - 15.7 g/dL    Hematocrit 31.5 (L) 35.0 - 47.0 %    MCV 90 78 - 100 fl    MCH 28.9 26.5 - 33.0 pg    MCHC 32.1 31.5 - 36.5 g/dL    RDW 17.2 (H) 10.0 - 15.0 %    Platelet Count 85 (L) 150 - 450 10e9/L    Diff Method Automated Method     % Neutrophils 42.3 %    % Lymphocytes 45.7 %    % Monocytes 8.1 %    % Eosinophils 2.9 %    % Basophils 0.4 %    % Immature Granulocytes 0.6 %    Nucleated RBCs 0 0 /100    Absolute Neutrophil 2.3 1.6 - 8.3 10e9/L    Absolute Lymphocytes 2.5 0.8 - 5.3 10e9/L    Absolute Monocytes 0.4 0.0 - 1.3 10e9/L    Absolute Eosinophils 0.2 0.0 - 0.7 10e9/L    Absolute Basophils 0.0 0.0 - 0.2 10e9/L    Abs Immature Granulocytes 0.0 0 - 0.4 10e9/L    Absolute Nucleated RBC 0.0    Magnesium   Result Value Ref Range    Magnesium 1.6 1.6 - 2.3 mg/dL   Phosphorus   Result Value Ref Range    Phosphorus 2.9 2.5 - 4.5 mg/dL   Comprehensive metabolic panel   Result Value Ref Range    Sodium 138 133 - 144 mmol/L    Potassium 4.4 3.4 - 5.3 mmol/L    Chloride 108 94 - 109 mmol/L    Carbon Dioxide 26 20 - 32 mmol/L    Anion Gap 4 3 - 14 mmol/L    Glucose 100 (H) 70 - 99 mg/dL    Urea Nitrogen 12 7 - 30 mg/dL    Creatinine 1.00 0.52 - 1.04 mg/dL    GFR Estimate 49 (L) >60 mL/min/[1.73_m2]    GFR Estimate If Black 56 (L) >60 mL/min/[1.73_m2]    Calcium 7.2 (L) 8.5 -  10.1 mg/dL    Bilirubin Total 0.6 0.2 - 1.3 mg/dL    Albumin 2.2 (L) 3.4 - 5.0 g/dL    Protein Total 4.8 (L) 6.8 - 8.8 g/dL    Alkaline Phosphatase 62 40 - 150 U/L    ALT 20 0 - 50 U/L    AST 25 0 - 45 U/L   Calcium ionized whole blood   Result Value Ref Range    Calcium Ionized Whole Blood 3.9 (L) 4.4 - 5.2 mg/dL   Phosphorus   Result Value Ref Range    Phosphorus 3.0 2.5 - 4.5 mg/dL   Lactic acid level STAT   Result Value Ref Range    Lactate for Sepsis Protocol 2.3 (H) 0.7 - 2.0 mmol/L   EKG 12-lead, complete   Result Value Ref Range    Interpretation ECG Click View Image link to view waveform and result        Medications     dextrose 5% and 0.45% NaCl + KCl 10 mEq/L 50 mL/hr at 10/05/20 1110       acetaminophen  1,000 mg Oral TID     calcium citrate  950 mg Oral BID     cholecalciferol  1,250 mcg Oral Q7 Days     cyanocobalamin  500 mcg Oral Daily     diclofenac  2 g Transdermal BID     doxycycline hyclate  100 mg Oral BID     famotidine  20 mg Oral QAM     levofloxacin  500 mg Oral Every Other Day     loratadine  10 mg Oral QPM     mirtazapine  7.5 mg Orally disintegrating tablet At Bedtime     multivitamin CF FORMULA  1 capsule Oral Daily     multivitamin, therapeutic  1 tablet Oral Daily     pantoprazole  40 mg Oral QAM AC     polyethylene glycol  17 g Oral Daily     polyethylene glycol-propylene glycol  1 drop Both Eyes 4x Daily     sertraline  25 mg Oral Daily     sodium chloride (PF)  3 mL Intracatheter Q8H     sodium chloride (PF)  3 mL Intracatheter Q8H     thiamine  500 mg Intravenous TID    Followed by     [START ON 10/7/2020] thiamine  250 mg Intravenous Daily    Followed by     [START ON 10/12/2020] thiamine  100 mg Oral Daily     vitamin B complex with vitamin C  1 tablet Oral Daily     zinc sulfate  220 mg Oral Daily

## 2020-10-05 NOTE — PLAN OF CARE
Patient slept till nearly 1100. Able to speak enough English for staff to understand but mainly Green[place] speaking. [place] and wearing one hearing aid in left ear. Denies pain. Poor appetite with meals. Ate some scrambled eggs and sausage for brunch but then threw up shortly mixed with some pills. On Calorie counts. Likes beverages warmed up. Able to drink some vanilla boost. Lower back abrasion covered with mepilex. Daughter reported it was from when staff was taking off gait belt yesterday. PIV infusing with D51/2 +10K at 50 ml/hr. Daughter came around 1400 and brought patient some soup and rice. Bed alarm on for safety. Will continue to monitor and follow plan of care.

## 2020-10-05 NOTE — PROGRESS NOTES
Sepsis Evaluation Progress Note     I was called to see Jatin Ayala due to BPA-triggered a lab draw for lactic acid. She is known to have an infection. She has osteomyelitis of second toe, treated w/ Doxy/Levo, managed by ID. Has been doing well.     Physical Exam   Vital Signs:  Temp: 95.9  F (35.5  C) Temp src: Oral BP: 117/69 Pulse: 97   Resp: 16 SpO2: 100 % O2 Device: None (Room air)       General: in no acute distress  Mental Status: delusional (stable from 10/4), speaking clearly but perseverating over saving everyoone from covid (per God's message sent to her).   Breathing comfortably on room air, Lungs clear.   RRR w/o murmur.     Data   Lactic Acid   Date Value Ref Range Status   09/22/2020 1.3 0.7 - 2.0 mmol/L Final   02/13/2016 1.5 0.4 - 2.0 mmol/L Final     Lactate for Sepsis Protocol   Date Value Ref Range Status   10/05/2020 2.3 (H) 0.7 - 2.0 mmol/L Final     Comment:     Critical result called to and read back by BY JUSTIN BLEDSOE RN       Assessment & Plan   NO EVIDENCE OF SEPSIS at this time.  Vital sign, physical exam, and lab findings are likely due to dehydration and severe malnutrition.    Does have some shortness of breath w/ exertion (progressive over past several weeks), but no chest pain. Will obtain CXR, blood cx, troponin, and EKG. Administer 500cc bolus LR, then recheck lactic acid at 1815.     Continue treating for known osteomyelitis w/ doxy and levoquin.     Disposition: The patient will remain on the current unit. We will continue to monitor this patient closely..  Jesica Ayala MD    Sepsis Criteria   Sepsis: 2+ SIRS criteria due to infection  Severe Sepsis: Sepsis AND 1+ new sign of acute organ dysfunction (Note: lactate >2 or acute encephalopathy each qualify as organ dysfunction)  Septic Shock: Sepsis AND hypotension despite volume resuscitation with 30 ml/kg crystalloid or lactate >=4  Note: HYPOTENSION is defined as 2 BP readings measured 3 hrs apart that have a SBP <90,  MAP <65, or decrease >40 mmHg, occurring 6 hrs before or after t-zero

## 2020-10-05 NOTE — PROGRESS NOTES
[facility]   Patient is currently open to home care services with [facility]. The patient is currently receiving RN PT OT services. [facility]  and team have been notified of patient admission. [facility] liaison will continue to follow patient during stay. If appropriate provide orders to resume home care at time of discharge.    Allie Michel RN BSN  [facility] Liaison  [phone]

## 2020-10-05 NOTE — PLAN OF CARE
Cares 1900 to 0700    Neuros: Alert, disoriented to time & situation. [place]. On bed alarms. Native Turkmen speaking. Uses call light.    Cardiac: WDL  Resp: No SOB reported. RA.   GI/: Uses bedside commode, urine incontinence, urgency & frequency to void. 1 small BM  this shift.  Nutrition: Reg, fluid intake encouraged, on calorie counts.   IV/Drains: R PIV infusing 1/2 NS, D5 & KCl @ 100ml/hr.   Pain: At IV access sites. Warm packs applied.   Skin: Dry, flaky. Bruised toe on L foot.   Activity: Assist x1 to commode. Weak, unsteady gait.      Events this shift: Phosphate replaced. L PIV removed d/t pt complaining of pain at site. Warm pack applied.

## 2020-10-05 NOTE — PROGRESS NOTES
Rapid Response Team Note    Assessment   In assessment a rapid response was called on Jatin In [facility] due to SIRS/Sepsis trigger.  Unclear why sepsis BPA was triggered.  Vitals signs are stable.  No leukocytosis or fever.  Lactate 2.3.      This presentation is likely due to volume depletion, chronic infection, or acute infection and worsened by the comorbidities listed above. The patient is NOT critically ill at this time.    Sepsis Evaluation   NO EVIDENCE OF SEPSIS at this time.  Vital sign, physical exam, and lab findings are likely due to possible volume depletion.    Plan   - Blood cultures and CXR ordered by primary team  - Continue doxycycline and levofloxacin pending above work up, no indication to broaden antibiotics at this time  - 500 mL bolus ordered by primary team  - Recommend repeat lactate in 4 hours    Disposition: The patient will remain on the current unit. We will continue to monitor this patient closely..    The Family Practice primary team was able to be reached and they are in agreement with the above plan.    Reassessment   Reassessment and plan follow-up will be performed by the primary team. The current agreed upon plan for reassessment/follow-up includes the following labs: lactate, CXR and will be completed in 4 hours.    Time Spent on this Encounter   Total Critical Care time spent by me, excluding procedures, was 0 minutes.    Robin Mancini PA-C  [facility] RRT [facility] Job Code Contact #[id number]    Hospital Course   Admission Diagnosis: Elevated troponin [R77.8]  Altered mental status, unspecified altered mental status type [R41.82] Chronic osteomyelitis of R 2nd toe    Brief Summary of events leading to rapid response:   A rapid response was called for Jatin In [facility] due to lactic acidosis triggered by sepsis/SIRS BPA at 1600 on 10/05/20.  Family practice provider at bedside and knows the patient very well.  Reports patient has a history of chronic osteo of right 2nd toe which is currently  being treated with doxycycline and levofloxacin.  She is being followed by ID.  She was admitted with acute delirium and malnutrition.  Unclear if confusion is worse/better today.  Per provider, currently having goals of care discussion.    Patient denies any acute complaints.  No fevers or chills, chest pain, dyspnea, worsening cough, abdominal pain, nausea, or vomiting.  Patient has chronic diarrhea, which is stable.  No urinary complaints.      The patients is known to have an infection.    Significant Comorbidities:   Hypertension  CKD, stage III  Chronic osteomyelitis  Dementia    Medications   Scheduled     acetaminophen  1,000 mg Oral TID     calcium citrate  950 mg Oral BID     cholecalciferol  1,250 mcg Oral Q7 Days     cyanocobalamin  500 mcg Oral Daily     diclofenac  2 g Transdermal BID     doxycycline hyclate  100 mg Oral BID     famotidine  20 mg Oral QAM     lactated ringers  500 mL Intravenous Once     levofloxacin  500 mg Oral Every Other Day     loratadine  10 mg Oral QPM     mirtazapine  7.5 mg Orally disintegrating tablet At Bedtime     multivitamin CF FORMULA  1 capsule Oral Daily     multivitamin, therapeutic  1 tablet Oral Daily     pantoprazole  40 mg Oral QAM AC     polyethylene glycol  17 g Oral Daily     polyethylene glycol-propylene glycol  1 drop Both Eyes 4x Daily     sertraline  25 mg Oral Daily     sodium chloride (PF)  3 mL Intracatheter Q8H     sodium chloride (PF)  3 mL Intracatheter Q8H     thiamine  500 mg Intravenous TID    Followed by     [START ON 10/7/2020] thiamine  250 mg Intravenous Daily    Followed by     [START ON 10/12/2020] thiamine  100 mg Oral Daily     vitamin B complex with vitamin C  1 tablet Oral Daily     zinc sulfate  220 mg Oral Daily      PRN   benztropine, lidocaine 4%, lidocaine 4%, lidocaine (buffered or not buffered), lidocaine (buffered or not buffered), magnesium sulfate, melatonin, naloxone, ondansetron **OR** ondansetron, potassium chloride,  potassium chloride with lidocaine, potassium chloride, potassium chloride, potassium chloride, potassium phosphate (KPHOS) in D5W IV, potassium phosphate (KPHOS) in D5W IV, potassium phosphate (KPHOS) in D5W IV, potassium phosphate (KPHOS) in D5W IV, sodium chloride (PF), sodium chloride (PF)   Allergies   No Known Allergies     Physical Exam   Temp: 95.9  F (35.5  C) Temp  Min: 95.4  F (35.2  C)  Max: 97.6  F (36.4  C)  Resp: 16 Resp  Min: 16  Max: 16  SpO2: 100 % SpO2  Min: 100 %  Max: 100 %  Pulse: 97 Pulse  Min: 67  Max: 103    No data recorded  BP: 117/69 Systolic (24hrs), Av , Min:89 , Max:174   Diastolic (24hrs), Av, Min:56, Max:82     I/Os: I/O last 3 completed shifts:  In: 1050 [P.O.:250; I.V.:800]  Out: 1500 [Urine:1500]     Exam:   General: chronically ill appearing  Mental Status: baseline mental status.  CV:  RRR, no murmur or gallop  Pulm:  CTAB  GI:  Soft, ND, NT, +BS.  Ext:  No swelling or edema.  Skin:  Warm, Dry, no rash or skin lesions.    Ext:  No erythema, swelling or necrosis of toes.    Significant Results and Procedures   Lactic Acid:   Recent Labs   Lab Test 10/05/20  1548 20  1653 16  1059   LACT  --  1.3 1.5   LACTS 2.3*  --   --      CBC:   Recent Labs   Lab Test 10/05/20  0548 10/04/20  0931 10/03/20  0958   WBC 5.5 5.3 4.8   HGB 10.1* 10.5* 8.9*   HCT 31.5* 32.5* 28.1*   PLT 85* 93* 76*

## 2020-10-05 NOTE — PROVIDER NOTIFICATION
Writer, in role of charge RN, received critical lactic acid result of 2.3. Bedside RN Amador, notified of elevated level and RRT called. MD of pt already at bedside and family is present also.

## 2020-10-05 NOTE — PROVIDER NOTIFICATION
10/05/20 1600   Call Information   Date of Call 10/05/20   Time of Call 1600   Name of person requesting the team Mohinder Porter   Title of person requesting team RN   RRT Arrival time 1601   Time RRT ended 1618   Reason for call   Type of RRT Adult   Primary reason for call Sepsis suspected   Sepsis Suspected Elevated Lactate level   Was patient transferred from the ED, ICU, or PACU within last 24 hours prior to RRT call? No   SBAR   Situation Lactic Acid 2.3   Background FTT, osteomyelitis, anemia, thrombocytopenia, dementia/depression.  Hx CKD, GERD, HTN.   Notable History/Conditions End-Stage disease;Hypertension;Neurological   Assessment Pt. alert, intermittently confused.  Afebrile with vss.  Denies pain or SOB.  Respirations easy in RA.   Interventions Fluid bolus;Labs;Meds;Portable monitor   Patient Outcome   Patient Outcome Stabilized on unit   RRT Team   Attending/Primary/Covering Physician Robin Mancini PA   Date Attending Physician notified 10/05/20   Time Attending Physician notified 1600   Physician(s) Dr. Jesica Carrington   Lead RN Gayla Porter   RT Ranjith SAMS   Post RRT Intervention Assessment   Post RRT Assessment Stable/Improved   Date Follow Up Done 10/05/20   Time Follow Up Done 1811   Comments Remains afebrile with vss and easy respirations in RA.  See also PA note indicating no sepsis at this time.

## 2020-10-06 NOTE — PLAN OF CARE
Cares 2596-7426. Pt alert, oriented x4 this shift. VSS on RA. [place], uses hearing aid in L ear. Maltese and English speaking. Voiding adequately at bedside commode. Denies pain/nausea. PIV infusing D5 1/2 NS with KCl. Mag replaced this shift. Family conference scheduled for 1430 today to determine plan of care. Continue to monitor.

## 2020-10-06 NOTE — PROGRESS NOTES
Calorie Count  Intake recorded for: 10/5  Total Kcals: 270 Total Protein: 11g  Kcals from Hospital Food: 270  Protein: 11g  Kcals from Outside Food (average):0 Protein: 0g  # Meals Recorded: 2 meals ordered from kitchen, no food intake recorded.   # Supplements Recorded: 75% 1 Boost Plus

## 2020-10-06 NOTE — PLAN OF CARE
BP (!) 149/54 (BP Location: Left arm)   Pulse 76   Temp 95.8  F (35.4  C) (Oral)   Resp 16   Wt 37 kg (81 lb 9.1 oz)   SpO2 98%   BMI 17.60 kg/m      Assumed care 1900 to 0730  Pt rounded on hourly  Alexandr: Pt oriented to self and place   Pain: negligible pain  GI/: Denies nausea. Bowel sounds present. Good urine output clear yellow urine  Cardiac: WDL  Respiratory: denies SOB lung sounds clear bilaterally  Skin: clean dry and intact Pt has infiltration to right arm heat applied  Lines: PIV infusing  Assist level: 1 assist  Will continue to monitor and follow plan of care.   Plan: Improve diet and intake, Pt only drank sips of fluids last night

## 2020-10-06 NOTE — CONSULTS
Care Management Follow Up Note    Length of Stay (days) 4    Patient plan of care discussed at Interdisciplinary Rounds: yes  Expected Discharge Date: 10/09/20  Concerns to be Addressed: End of life, hospice       Anticipated Discharge Disposition:  Pending family choice  Anticipated Discharge Services:  TBD    Plan:  SW met with pt and pt's daughter Sharri. Introduced self and reason for visit. Sharri stated she does not know much about hospice services, so SW provided general overview.     Sharri explained that family will be meeting with the team today to discuss whether they want hospice or a different goal at discharge. In the meantime, Sharri asked LILIYA to follow up with [facility] ([phone]) to see if they have the capacity to take pt and for cares. She is aware that hospice will be provided by a separate agency. Pt does [facility], which hopefully means she would not have the room & board costs that she would accrue without MA.    SW contacted [facility] with request and received call back from director London Paulino. She reports she may be able to accommodate pt's needs but wished to speak with family for more detail. She stated she would call pt's daughter Sharri before making a determination.    SW will continue to remain available for patient and family support, discharge planning, other resources and support PRN. Primary  Hardy Mckoy will return and take case tomorrow.    ALEIDA Aguilar, [facility]  ICU  covering 5B   [facility]   P: [phone]  Pager: [phone]

## 2020-10-06 NOTE — PROGRESS NOTES
"[facility] Progress Note  Admitted 10/2/2020    Main Plans for Today   Severe malnutrition w/ very poor appetite. Continues to be delirious, but was quite alert and keenly oriented during goals-of-care conversation this afternoon. Currently pain-free. Content.     - SW consult for hospice.   - Meeting w/ Diana's 3 daughters to discuss goals of care: elected for hospice  - Minimize disturbances at night. (Specified in orders to not get vitals while patient sleeping at night).   - No daily labs. Okay to obtain labs if henderson change in clinical status.   - Continue w/ vitamin/mineral replacement currently. No tube feeds per family decision.   - Continue mirtazapine. Stop zoloft (long half life).     Assessment & Plan   Diana is a 89 year old female with acute on chronic malnutrition due to poor oral intake w/ BMI 17.6, dementia, chronic osteomyelitis (followed by ID and on doxy/levo, end date 10/7), GERD, and CKD Stage III, who was admitted from assisted living facility for delirium and remains admitted pending dispo due to increased needs for ADLs.     # Severe malnutrition w/ BMI 17.6   # Chronic osteomyelitis   # Dementia   # CKD Stage III  # Failure to thrive   10/6: Discussed overall prognosis w/ above factors in mind w/ daughter, Kat. Discussed how 1 year ago, Diana was relatively independent, and she is clearly in a different place now. Profoundly malnourished with very low likelihood of returning to \"baseline\" from even ~2 months ago. Diana w/ delusions at this time and repeatedly asks to get message off of chest so she can \"go home,\" which daughter interprets as patient ready to die. Kat open to hospice, but wants more info and to discuss w/ siblings.   - Discussion w/ Diana and 3 daughters on 10/6 around 1300   - SW to assist in presenting hospice resources  - Consider palliative consult  - Clarified code status w/ Kat, which is " DNR/DNI (as indicated in orders)    # Severe malnutrition w/ BMI 17.6, 10lb weight loss within past month per NH records seen in Media Section (91.8lbs on 9/1/2020; 80 lb on 9/21/2020), temporal wasting, decreased bone marrow production, evidenced by thrombocytopenia and anemia   # Refeeding (Mg, phos, and K stable; but this admission has had hypomagnesemia, hypophosphatemia, and hypokalemia at times)    # Failure to thrive  # At risk for Wernicke's Encephalopathy  Patient's appetite has been poor for past couple of months with minimal oral intake, worsened by nausea/vomiting a/w taking antibiotics (not w/ every meal, but with most). Very picky eater. Poor dentition (edentulous bottom row d/t covid-related delays in denture fittings).    - Thiamine per Wernicke's protocol   - Replace electrolytes via protocols.   - Vit D, Vit B1, Vit B2, Zinc, Copper pending  - Replace Vitamin B, D, ADEK, calcium, multivitamin, zinc orally (Will need copper po supplementation on discharge [not available inpt].)  - Nutrition consulted re PPN on 10/4. They're advising TF. Initiated conversation with Kat on 10/5, but defer remainder to 10/6 to hold w/ other family members as well. Diana having modest oral intake today, but w/ emesis afterward.   - Per nutrition: consider scheduling anti-emetics/antinauseants ~20 min prior to meals  - Available PRN, difficult to schedule d/t variable eating times. Will ask RNs to please offer zofran prior to meals.   - Defer telemetry d/t risk of worsening delirium. Spot EKGs PRN.   - Need further goals of care conversations with family     # Secondary hyperparathyroidism   Suspect secondary hyperparathyroidism d/t hypocalcemia in context of severe malnutrition.   - Vit D pending  - iCal daily; replete w/ calcium gluconate PRN to keep >4 (Note: 1g dose usually sufficient for her)  - Anticipate calcium repletion may contribute to hypophosphatemia; but at this time okay to replete as phos is WNL.    #  Thrombocytopenia  # Normocytic anemia  Peripheral smear w/ normochromic normocytic anemia and thrombocytopenia w/o other morphologic abnormalities. Due to severe malnutrition c/b bone marrow hypoproliferation.  Retic count low. Stable at this time. Does have history of positive FITT test, but CTAP during September admission w/o any signs concerning for colon cancer, so much less likely. Continue to trend.     # Dementia and depression  Initially admitted 10/2 for delirium. Work-up has been largely negative, except for profound malnutrition. Have discussed daughters who agree that this is likely pseudodementia on her chroinc dementia. Zoloft at 50 mg daily has not shown any benefit (per her daughters), and she is not eating much at all. On 10/2, started cross taper off of zoloft and onto mirtazapine.   - Zoloft @ 25 mg daily  - Mirtazapine 15 mg at bedtime to help with mood and appetite  - Delirium precautions     # Elevated troponin, stable/improved  # Anterolateral infarct of unknown age  Troponin 0.059 on admit, with T wave inversions in manjit-lateral leads on EKG, new since 2016. Unclear duration. Pt denied chest pain and shortness of breath on admission and is not hypoxic or tachycardic. Case was discussed with cardiology by ED physician who recommended admission for troponin trend and echo. Troponin trending down as of 10/3 and echo normal so low suspicion for acute coronary syndrome at this time. Monitor clinically.     Chronic:   - Osteomyelitis Doing well on Doxy/Levo with end date of 10/7. Do not feel levo is contributing to delirium as she started this on 9/11. Infection stable. Continue doxy and levo. RN to please wrap toe osteo w/ lamb's wool daily.     - CKD III: Baseline ~1.2 (but unclear). Creatinine on admission was 1.37, from 1.36 on recent discharge. 1.03 on 10/4. Daily BMP.     - HTN: Was on three drug regimen prior to last admission and was admitted with hypotension so medications were held. Not  "currently on any medications and BP normal to slightly hypertensive at times. Continue to monitor BP. Permissive hypertension to ~150/90.      - GERD: Continue pta H2 blocker and PPI    Diet: Combination Diet Regular Diet Adult  Calorie Counts  Snacks/Supplements Adult: Other; Boost Plus @ 10, Boost Breeze @ 2, and Gelatein @ HS. Pt may prefer oral supplements at room temperature.; Between Meals  Fluids: D5 0.45% NS + K at 50 mL/hr  DVT Prophylaxis: Pneumatic Compression Devices  Code Status: DNR / DNI    Disposition Plan   Expected discharge:Expected Discharge Date: 10/09/20 2 - 3 days, recommended to undecided level of care once mental status at baseline.Expected Discharge Date: 10/09/20        Entered: Jesica Ayala 10/06/2020, 7:36 AM   Information in the above section will display in the discharge planner report.      The patient's care was discussed with the Attending Physician, Dr. Pham.    Jesica Ayala  [facility]: PGY-3  Pager: [phone]  Please see sticky note for cross cover information    Interval History    Continues to be confused and perseverating over message she's receiving from [employer] about how to save everyone from covid--can't find the ED doc to share the message with, so shared with me instead. Per daughter, slightly more confused today. Some shortness of breath w/ exertion (chronic, slow, progressive, but she just told me today; see sepsis note). No cough, chest pain, fever. Appetite somewhat improved today. Had episode of emesis after eating.  Pt denying pain. Pt frustrated w/ so much commotion and \"just want some rest. Quiet.\"    Physical Exam   Vital Signs: Temp: 95.8  F (35.4  C) Temp src: Oral BP: (!) 149/54 Pulse: 76   Resp: 16 SpO2: 98 % O2 Device: None (Room air)    Weight: 81 lbs 9.12 oz     Laying back in bed and appears comfortable.  Perseverating/delusional about saving the world from coronavirus, but needing to relay her message " to ER doctor.   Lungs clear. No cough.   Heart RRR w/o murmur.   Very thin. Emaciated w/ temporal wasting and hypothenar atrophy.   Lesion on 2nd toe (osteo) right foot appears stable.   Abdomen thin and soft.   No LE edema.   Non-toxic.       Data   Results for orders placed or performed during the hospital encounter of 10/02/20 (from the past 24 hour(s))   Calcium ionized whole blood   Result Value Ref Range    Calcium Ionized Whole Blood 3.9 (L) 4.4 - 5.2 mg/dL   Phosphorus   Result Value Ref Range    Phosphorus 3.0 2.5 - 4.5 mg/dL   Lactic acid level STAT   Result Value Ref Range    Lactate for Sepsis Protocol 2.3 (H) 0.7 - 2.0 mmol/L   EKG 12-lead, complete   Result Value Ref Range    Interpretation ECG Click View Image link to view waveform and result    Blood culture    Specimen: Blood    Left Arm   Result Value Ref Range    Specimen Description Blood Left Arm     Culture Micro No growth after 10 hours    Blood culture    Specimen: Blood    Left Hand   Result Value Ref Range    Specimen Description Blood Left Hand     Culture Micro No growth after 10 hours    Procalcitonin   Result Value Ref Range    Procalcitonin <0.05 ng/ml   Troponin I   Result Value Ref Range    Troponin I ES 0.043 0.000 - 0.045 ug/L   Lactic acid whole blood   Result Value Ref Range    Lactic Acid 2.7 (H) 0.7 - 2.0 mmol/L   XR Chest 2 Views    Narrative    XR CHEST 2 VW  10/5/2020 6:49 PM      HISTORY: Sepsis    COMPARISON: Chest x-ray 1417, chest CT 3/31/2006    TECHNIQUE: Frontal and lateral views of the chest.    FINDINGS: Bibasilar streaky opacities.. Calcified granuloma left lower  lobe stable from previous CT. No pneumothorax or significant pleural  effusion. Cardiac mediastinal silhouette is unchanged. Trachea is  midline. Surgical clips in the upper abdomen. Heavy aortic  calcification. Chronic appearing right-sided rib fracture.  Multiple  compression fractures of the thoracic spine most pronounced at  approximately T4 with  greater than 50% height loss as well as multiple  others in the lower thoracic and upper lumbar spine.      Impression    IMPRESSION:   1. Bibasilar streaky opacities favored to be atelectasis.  2. Multiple thoracic compression fractures most pronounced at  approximately T4, some of which are age indeterminate. Recommend  correlation with previous outside imaging if available and patient  tenderness.    I have personally reviewed the examination and initial interpretation  and I agree with the findings.    YAYA WILLINGHAM MD   Lactic acid whole blood   Result Value Ref Range    Lactic Acid 1.7 0.7 - 2.0 mmol/L   CBC with platelets differential   Result Value Ref Range    WBC 4.9 4.0 - 11.0 10e9/L    RBC Count 3.63 (L) 3.8 - 5.2 10e12/L    Hemoglobin 10.4 (L) 11.7 - 15.7 g/dL    Hematocrit 32.5 (L) 35.0 - 47.0 %    MCV 90 78 - 100 fl    MCH 28.7 26.5 - 33.0 pg    MCHC 32.0 31.5 - 36.5 g/dL    RDW 17.2 (H) 10.0 - 15.0 %    Platelet Count 93 (L) 150 - 450 10e9/L    Diff Method Automated Method     % Neutrophils 44.4 %    % Lymphocytes 43.8 %    % Monocytes 8.8 %    % Eosinophils 2.2 %    % Basophils 0.6 %    % Immature Granulocytes 0.2 %    Nucleated RBCs 0 0 /100    Absolute Neutrophil 2.2 1.6 - 8.3 10e9/L    Absolute Lymphocytes 2.2 0.8 - 5.3 10e9/L    Absolute Monocytes 0.4 0.0 - 1.3 10e9/L    Absolute Eosinophils 0.1 0.0 - 0.7 10e9/L    Absolute Basophils 0.0 0.0 - 0.2 10e9/L    Abs Immature Granulocytes 0.0 0 - 0.4 10e9/L    Absolute Nucleated RBC 0.0    Magnesium   Result Value Ref Range    Magnesium 1.3 (L) 1.6 - 2.3 mg/dL   Phosphorus   Result Value Ref Range    Phosphorus 3.0 2.5 - 4.5 mg/dL   Comprehensive metabolic panel   Result Value Ref Range    Sodium 138 133 - 144 mmol/L    Potassium 3.8 3.4 - 5.3 mmol/L    Chloride 106 94 - 109 mmol/L    Carbon Dioxide 27 20 - 32 mmol/L    Anion Gap 5 3 - 14 mmol/L    Glucose 85 70 - 99 mg/dL    Urea Nitrogen 12 7 - 30 mg/dL    Creatinine 1.20 (H) 0.52 - 1.04 mg/dL     GFR Estimate 39 (L) >60 mL/min/[1.73_m2]    GFR Estimate If Black 45 (L) >60 mL/min/[1.73_m2]    Calcium 8.1 (L) 8.5 - 10.1 mg/dL    Bilirubin Total 0.8 0.2 - 1.3 mg/dL    Albumin 2.3 (L) 3.4 - 5.0 g/dL    Protein Total 4.9 (L) 6.8 - 8.8 g/dL    Alkaline Phosphatase 64 40 - 150 U/L    ALT 21 0 - 50 U/L    AST 23 0 - 45 U/L   Calcium ionized whole blood   Result Value Ref Range    Calcium Ionized Whole Blood 4.8 4.4 - 5.2 mg/dL       Medications     dextrose 5% and 0.45% NaCl + KCl 10 mEq/L 50 mL/hr at 10/05/20 1110       acetaminophen  1,000 mg Oral TID     calcium citrate  950 mg Oral BID     cholecalciferol  1,250 mcg Oral Q7 Days     cyanocobalamin  500 mcg Oral Daily     diclofenac  2 g Transdermal BID     doxycycline (VIBRAMYCIN) IV  100 mg Intravenous Q12H     famotidine  20 mg Oral QAM     [START ON 10/7/2020] levofloxacin  500 mg Intravenous Q48H     loratadine  10 mg Oral QPM     mirtazapine  7.5 mg Orally disintegrating tablet At Bedtime     multivitamin CF FORMULA  1 capsule Oral Daily     multivitamin, therapeutic  1 tablet Oral Daily     pantoprazole  40 mg Oral QAM AC     polyethylene glycol  17 g Oral Daily     polyethylene glycol-propylene glycol  1 drop Both Eyes 4x Daily     sertraline  25 mg Oral Daily     sodium chloride (PF)  3 mL Intracatheter Q8H     sodium chloride (PF)  3 mL Intracatheter Q8H     thiamine  500 mg Intravenous TID    Followed by     [START ON 10/7/2020] thiamine  250 mg Intravenous Daily    Followed by     [START ON 10/12/2020] thiamine  100 mg Oral Daily     vitamin B complex with vitamin C  1 tablet Oral Daily     zinc sulfate  220 mg Oral Daily

## 2020-10-06 NOTE — PLAN OF CARE
Care from [id number]      Vitals: VSS     Neuro: At baseline per daughter except for intermittent confusion/rambling   ? Pain: Negligible pain per patient   ? Mood/Behavior: Calm, cooperative     Activity: Assist x1     Cardiovascular: WDL     Respiratory: WDL. SpO2 stable on room air     GI & Nutrition: WDL x no appetite; did not eat during shift; tolerated pills well with warm milk and taking one at a time  ? Nausea: Denies   ? Bowel Movement: 1x today    : WDL     Skin, Wounds & LDAs: IV infiltrated and removed; very cold skin    Notable Labs: Elevated lactate despite no criteria being met.     Events & Plan Updates: RRT for elevated lactate. Pt received 1L of LR IV. Monitoring lactic acid. Pt remained stable throughout shift and did not appear to be in distress. Beginning to talk to daughter and care team about discharge planning and changing the type of facility pt needs.

## 2020-10-07 NOTE — PLAN OF CARE
VSS. Alert and oriented to self and place.  Up to commode with assist of one.  Voided 100cc twice.  Post void residual 395.  Patient's daughter Kat was called to help explain the straight cath. Attempted 3 times to straight cath patient but unable to.  On call MD notified.  Night nurse with try again.  Patient's oral intake fair.    Plan to monitor urine output and straight cath as needed. Bed alarm on.

## 2020-10-07 NOTE — PROGRESS NOTES
Calorie Count  Intake recorded for: 10/6  Total Kcals: 0 Total Protein: 0g  Kcals from Hospital Food: 0   Protein: 0g  Kcals from Outside Food (average):0 Protein: 0g  # Meals Recorded: no meals ordered from kitchen, no intake recorded.   # Supplements Recorded: no intake recorded.     Note: Pt had broth, rice & yogurt from outside the hospital listed on calorie count sheet, but not enough information was given to calculate calories/protein.

## 2020-10-07 NOTE — PLAN OF CARE
Cares [id number]. Pt alert, oriented only to place and self. VSS on RA. [place], uses hearing aid in L ear. Thai and English speaking. Pt on comfort cares, no vitals and let pt sleep. Voiding adequately at bedside commode. Order for bladder scan if pt is in pain/uncomfortable. Denies pain/nausea. PIV SL. Continue to monitor.

## 2020-10-07 NOTE — PROGRESS NOTES
[facility]     Family Medicine Progress Note - [place]       Main Plans for Today   - Transition to comfort cares  - Disposition planning with family and social work     Assessment & Plan   Diana is a 89 year old female with acute on chronic malnutrition due to poor oral intake w/ BMI 17.6, dementia, chronic osteomyelitis (followed by ID and on doxy/levo, end date 10/7), GERD, and CKD Stage III, who was admitted from assisted living facility for delirium and remains admitted pending dispo due to increased needs for ADLs.      # Severe malnutrition w/ BMI 17.6   # Chronic osteomyelitis   # Dementia   # CKD Stage III  # Failure to thrive   After discussion with team on 10/6, family chooses to pursue hospice cares. SW assisting with placement as family would like to discharge as soon as possible.   - SW to assist in presenting hospice resources  - Orders adjusted to reflect comfort cares     # Severe malnutrition w/ BMI 17.6, 10lb weight loss within past month per NH records seen in Media Section (91.8lbs on 9/1/2020; 80 lb on 9/21/2020), temporal wasting, decreased bone marrow production, evidenced by thrombocytopenia and anemia   # Refeeding (Mg, phos, and K stable; but this admission has had hypomagnesemia, hypophosphatemia, and hypokalemia at times)    # Failure to thrive  # At risk for Wernicke's Encephalopathy  Given transition to hospice cares, will cease lab draws to evaluate for electrolyte abnormalities and will cease vitamin supplementation as patient is troubled by swallowing pills. These changes were discussed with family, who were agreeable.   - Discontinue lab draws   - Discontinue vitamin supplementation     # Secondary hyperparathyroidism   Suspect secondary hyperparathyroidism d/t hypocalcemia in context of severe malnutrition.   - discontinue lab draws     # Thrombocytopenia  # Normocytic anemia  Peripheral smear w/ normochromic normocytic anemia  and thrombocytopenia w/o other morphologic abnormalities. Due to severe malnutrition c/b bone marrow hypoproliferation.  Retic count low. Stable at this time. Does have history of positive FITT test, but CTAP during September admission w/o any signs concerning for colon cancer, so much less likely. Will discontinue trending in the setting of hospice cares.      # Dementia and depression  Initially admitted 10/2 for delirium. Work-up has been largely negative, except for profound malnutrition. Have discussed daughters who agree that this is likely pseudodementia on her chroinc dementia. Zoloft at 50 mg daily has not shown any benefit (per her daughters), and she is not eating much at all. On 10/2, started cross taper off of zoloft and onto mirtazapine, now off sertraline and on mirtazapine only.  - Mirtazapine 15 mg at bedtime to help with mood and appetite  - Delirium precautions     # Elevated troponin, stable/improved  # Anterolateral infarct of unknown age  Troponin 0.059 on admit, with T wave inversions in manjit-lateral leads on EKG, new since 2016. Unclear duration. Pt denied chest pain and shortness of breath on admission and is not hypoxic or tachycardic. Case was discussed with cardiology by ED physician who recommended admission for troponin trend and echo. Troponin trending down as of 10/3 and echo normal so low suspicion for acute coronary syndrome at this time. Will cease monitoring and provide comfort cares as needed for chest pain and SOB.      Chronic:   - Osteomyelitis Completed course of antibiotics on 10/7. Will not monitor for further infection. RN to please wrap toe osteo w/ lamb's wool daily.     - CKD III: Baseline ~1.2 (but unclear). Creatinine on admission was 1.37, from 1.36 on recent discharge. 1.03 on 10/4. Stopping daily BMP.       - HTN: Was on three drug regimen prior to last admission and was admitted with hypotension so medications were held. Not currently on any medications and BP  normal to slightly hypertensive at times. VS discontinued, will monitor for discomfort only.      - GERD: Discontinued pta H2 blocker and PPI, started maalox for ease of swallowing.     # Pain Assessment:  Current Pain Score 10/7/2020   Patient currently in pain? denies   Pain score (0-10) -   Jatin bird pain level was assessed and she currently denies pain.      Diet: Combination Diet Regular Diet Adult  Snacks/Supplements Adult: Other; Boost Plus @ 10, Boost Breeze @ 2, and Gelatein @ HS. Pt may prefer oral supplements at room temperature.; Between Meals  Fluids: PO  DVT Prophylaxis: Pneumatic Compression Devices  Code Status: DNR / DNI    Disposition Plan   Expected discharge: Tomorrow, recommended to assisted living for hospice cares once safe disposition plan/ TCU bed available. Dispo: Expected Discharge Date: 10/08/20        Entered: Belen Chang 10/07/2020, 4:29 PM   Information in the above section will display in the discharge planner report.      The patient's care was discussed with the Attending Physician, Dr. Traore.    Belen Chang  [facility]  Pager: [phone]  Please see sticky note for cross cover information    Interval History   Overnight events:  - Bladder scanned for >300cc, failed to place catheter. Pt declined straight cath overnight, subsequently voided ~600cc spontaneously, with PVR of 375.     Spoke with patient and daughter on rounds. Discussed ongoing transition to hospice cares including stopping lab draws and VS checks, transitioning to comfort medications only. Pt's daughter endorses that her mother has trouble with pills, would prefer patches/non-pill medications as much as possible. Was actively working with  and outside organizations to coordinate mother's discharge.     Physical Exam   Vital Signs: Temp: 96.7  F (35.9  C) Temp src: Oral BP: 121/60 Pulse: 98   Resp: 16 SpO2: 99 % O2 Device: None (Room air)    Weight: 85 lbs 8  oz  General Appearance: Pleasant, elderly Welsh woman, speaking in full sentences, pleasantly demented  Respiratory: Breathing well on RA, speaking in full sentences, no extra WOB or respiratory distress  Cardiovascular: WWP  GI: Abdomen non-distended  Skin: No obvious rashes, wounds, lesions        Data

## 2020-10-07 NOTE — PLAN OF CARE
"Cares 2300 to 0700    Pt stated that she was too \"embarrassed\" about receiving the straight cath done by the previous nurse and that she did not want it done again. Writer informed Jaimee's team and was informed to bladder scan again and to straight cath only if BS vol was over 500cc. Pt voided 300cc spontaneously in commode and then later 275cc, with post void residual at 375cc. Provider was okay with the output and stated that we did not have to straight cath her tonight and to let the pt get some rest.     After, pt slept most of the shift. Did not c/o pain. Pt is able to get to the commode by herself when it is placed right by her bedside. Pt has been using the call light diligently to get on and off the commode. On bed alarms.   "

## 2020-10-07 NOTE — PROGRESS NOTES
"Care Management Follow Up Note    Length of Stay (days) 5    Patient plan of care discussed at Interdisciplinary Rounds: yes  Expected Discharge Date: 10/09/20  Concerns to be Addressed: mental health       Anticipated Discharge Disposition:  [facility] vs. [facility]   Anticipated Discharge Services: Hospice Cares    Anticipated Discharge DME:      Plan:  SW spoke with pt's daughter Keyla and initially offered emotional support for challenging time. Daughter expressed appreciation for this and acknowledged difficulty with establishing care for mother while experiencing emotional challenge of situation.     Keyla indicated [facility] [place] ([phone]) has not yet contacted her, but her brother-in-law has been communicating with facility. SW relayed facility director London Paulino's message, per covering SW yesterday, \"may be able to accommodate pt's needs but wished to speak with family for more detail.\" SW offered to call facility and have them f/u with daughter re: facility's ability to accomodate pt's needs and potential for hospice cares, to which daughter agreed.    SW inquired about daughter/family's preference for [facility]. Daughter stated they would prefer pt to return to previous facility [facility] [phone] within [facility] \"if they can provide for pt's needs and facilitate Hospice cares\", if Hospice services are requested. Daughter clarified that [facility] [place] [facility], as [employer] listed facility as in [place]. Daughter provided SW with Services Care Coordinator of [facility], Mrs. Marie [phone].    SW offered to email daughter list of Hospice services in [place] and informed there is also [facility], if family desires. Daughter stated she will review list, and her and her siblings may want conference call to determine together whether they wish to pursue Hospice services. Daughter requested SW to reach out to facilities to " determine if they can facilitate Hospice services and provide cares for pt. LILIYA encouraged daughter to contact SW with any questions or concerns.    11:15 AM LILIYA spoke with representative of [facility] who informed Mrs. Marie is out of the office until tomorrow.   LILIYA then called London of [facility]; no answer, and voice mailbox was full. LILIYA informed daughter via email and sent list of Hospice agencies to: [email or web address].    *LILIYA will continue to attempt to communicate with two USP's re: Hospice and pt's cares. SW will remain in communication with daughter re: desired Hospice agencies and establish conference call with daughter and siblings PRN.    Addendum 2:33 PM LILIYA received email from Keyla requesting to meet with her in pt's room. SW met with Keyla and pt at bedside. Daughter stated she spoke with [facility] who indicated they can admit pt as early as tomorrow, but would need family to assist 24/7, which daughter stated family is willing to do. She also stated she spoke with Ms. Paulino of [facility] would have bed available for pt on Friday, 10/16/20. Daughter stated she and family would be open to  Hospice services, but inquired if they can provide services from one [facility] to the next.    Keyla then called LILIYA and informed that [facility] requested physician notes and recommendations faxed to: [phone] to ensure they can accommodate to pt's needs. LILIYA stated he will f/u with MD and inquire about pt's medical readiness for discharge.     LILIYA then spoke with Ashley of  Hospice services. She stated she would able to meet and speak with pt/daughter tomorrow, but would likely not be able to provide services until this weekend.     Addendum 3:28 PM LILIYA spoke with MD Belen Cook [phone] who indicated pt could discharge as early as tomorrow. MD stated pt's needs would likely simplify after she receives Hospice. MD offered to speak with facility directly.     LILIYA spoke with Keyla to inform.  Keyla then stated that [facility] also has [facility] in [place]:    [facility] skilled nursing  [address], [place] [address]  Mrs. Paulino [phone]  Nurse to Nurse: [phone]  F: [phone]     Keyla stated this facility could potentially admit pt tomorrow before discharging her to [facility] on 10/16/20. LILIYA setup w/c transport with [facility] at 2pm Thursday, 10/8/20. LILIYA then made conference call with Mrs. Paulino [phone] (liaison of [facility] facilities) and Keyla. Both confirmed pt can admit to [facility] tomorrow, and w/c ride scheduled for 2pm tomorrow. LILIYA encouraged both to reach out with any questions. LILIYA reminded Keyla that Ashley with  Hospice will contact her tomorrow morning.     Addendum 4:11 PM LILIYA received call from jessica Ramirez's [facility] care coordinator [phone] requesting update. LILIYA provided her with information listed above, along with Mrs. Paulino's contact information.     ALEIDA Devine, [facility]  Acute Care Float   [facility]    ALEIDA Beaver

## 2020-10-08 NOTE — PLAN OF CARE
Cares 6732-6981. VSS, on RA. Alert, oriented. Pt given imodium for diarrhea, MD did not want sample taken for loose stools overnight and this AM.     Discharged to: [facility] facility  Transportation: wheelchair, [facility] transport  Time: 1430  Prescriptions: gave pepto bismol, voltaren gel, and eye drops to daughter. No other prescriptions  Belongings: all belongings taken by daughter  PIV/Access: none  Paperwork: packet with face sheet printed and given to transport.

## 2020-10-08 NOTE — DISCHARGE SUMMARY
[facility]     Family Medicine Discharge Summary- [place]  Service    Date of Admission:  10/2/2020  Date of Service: 10/8/2020  Date of Discharge:  10/8/2020  Discharging Attending Provider: Dr. 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      # Severe malnutrition w/ BMI 17.6   # Refeeding syndrome  # Chronic osteomyelitis   # Dementia   # CKD Stage III  # Failure to thrive   # End of life cares  On admission, noted to have low BMI, poor PO intake resulting in very poor nutritional status. Initially treated with nutritional supplementation, vitamin supplementation and monitoring/treatment for refeeding syndrome (had occasionally low phos, responded well to replacement). After discussion with family and plan to transition to hospice cares, further lab monitoring for refeeding syndrome was discontinued and vitamin/mineral/electrolyte replacement was stopped. PO intake was encouraged for comfort and pleasure only. Would not resume monitoring for refeeding  syndrome, vitamin/min/electrolyte replacement or aggressive nutrition supplementation unless goals of care change per family.   - Follow-up with hospice team at new facility within one week of discharge    # Urinary retention  Patient intermittently experienced urinary retention. Able to void spontaneously, but found to have PVRs of 300-350cc. She was not uncomfortable with this retention and was very uncomfortable with straight catheterization. She is able to transfer to [facility] to void and voids spontaneously when instructed to attempt. Would not continue bladder scanning unless patient unable to void for extended periods of time when attempting (>4 hours) and would not straight cath unless patient uncomfortable and wishes to have her bladder decompressed.   - Bladder scans and straight catheterizations ONLY if patient uncomfortable due to urinary retention and would like decompression  - Defer to hospice team for ongoing planning, they may change these plans based on family and patient's goals and desires.      # Dementia and depression  # Delirium  Initially admitted 10/2 for delirium. Work-up largely negative, except for profound malnutrition. Likely pseudodementia on her chroinc dementia. Zoloft not helpful, so was discontinued with cross-taper of mirtazapine. Continued mirtazapine at discharge due to possible improvements in quality of life (sleep and depression), but would have low threshold for discontinuation as patient does have trouble with swallowing pills. Patient oriented to self, but not place or time. She is exceedingly pleasant, communicates her needs clearly and appropriately and is easily redirectable when confused.   - Continue Mirtazapine 15 mg at bedtime to help with mood and appetite as able and only if beneficial  - Delirium precautions       # Thrombocytopenia  # Normocytic anemia  Peripheral smear w/ normochromic normocytic anemia and thrombocytopenia w/o other morphologic abnormalities.  Due to severe malnutrition c/b bone marrow hypoproliferation.  Retic count low. Stable at this time. Does have history of positive FITT test, but CTAP during September admission w/o any signs concerning for colon cancer, so much less likely. Discontinued trending CBC in the setting of hospice cares.  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 Unclear duration. Pt denied chest pain and shortness of breath on admission and is not hypoxic or tachycardic. Case was discussed with cardiology by ED physician who recommended admission for troponin trend and echo. Troponin trending down as of 10/3 and echo normal so low suspicion for acute coronary syndrome at this time. Will cease monitoring and provide comfort cares as needed for chest pain and SOB.      Chronic:   - Osteomyelitis Completed course of antibiotics on 10/7. Will not monitor for further infection. RN to please wrap toe osteo w/ lamb's wool daily.     - CKD III: Baseline ~1.2 (but unclear). Creatinine on admission was 1.37, from 1.36 on recent discharge. 1.03 on 10/4. Stopping daily BMP.       - HTN: Was on three drug regimen prior to last admission and was admitted with hypotension so medications were held. Not currently on any medications and BP normal to slightly hypertensive at times. VS discontinued, will monitor for discomfort only.      - GERD: Discontinued pta H2 blocker and PPI, started maalox for ease of swallowing.     # Discharge Pain Plan:   - Patient currently has NO PAIN and is not being prescribed pain medications on discharge.    Consultations This Hospital Stay   PHARMACY IP CONSULT  VASCULAR ACCESS CARE ADULT IP CONSULT  NUTRITION SERVICES ADULT IP CONSULT  VASCULAR ACCESS CARE ADULT IP CONSULT  VASCULAR ACCESS CARE ADULT IP CONSULT  CARE  MANAGEMENT / SOCIAL WORK IP CONSULT    Code Status   DNR / DNI       The patient was discussed with Dr. León Hermosillo's Family Medicine Inpatient Service  [facility]   Pager:[phone]_  ___________________________________________________________________  Review of Systems:  CONSTITUTIONAL: NEGATIVE for fever, chills, change in weight  INTEGUMENTARY/SKIN: NEGATIVE for worrisome rashes, moles or lesions  EYES: NEGATIVE for vision changes or irritation  ENT/MOUTH: NEGATIVE for ear, mouth and throat problems  RESP: NEGATIVE for significant cough or SOB  BREAST: NEGATIVE for masses, tenderness or discharge  CV: NEGATIVE for chest pain, palpitations or peripheral edema  GI: NEGATIVE for nausea, abdominal pain, heartburn, or change in bowel habits  : NEGATIVE for frequency, dysuria, or hematuria  MUSCULOSKELETAL: NEGATIVE for significant arthralgias or myalgia  NEURO: NEGATIVE for weakness, dizziness or paresthesias  ENDOCRINE: NEGATIVE for temperature intolerance, skin/hair changes  HEME/ALLERGY: NEGATIVE for bleeding problems  PSYCHIATRIC: NEGATIVE for changes in mood or affect    Physical Exam   Vital Signs: Temp: 95.4  F (35.2  C) Temp src: Axillary BP: 135/66 Pulse: 73   Resp: 16 SpO2: 95 % O2 Device: None (Room air)    Weight: 85 lbs 8 oz    General Appearance: Elderly, thin woman, sleeping peacefully in bed, snoring quietly, NAD  Respiratory: Breathing steadily, no respiratory distress or extra work of breathing. Quiet snoring.  Cardiovascular: Regular rate. WWP.   GI: Abdomen flat.   Skin: No obvious rashes, bruising, lesions    Significant Results and Procedures   Most Recent 3 CBC's:  Recent Labs   Lab Test 10/06/20  0612 10/05/20  0548 10/04/20  0931   WBC 4.9 5.5 5.3   HGB 10.4* 10.1* 10.5*   MCV 90 90 89   PLT 93* 85* 93*     Most Recent 3 BMP's:  Recent Labs   Lab Test 10/06/20  0612 10/05/20  0548 10/04/20  0931    138 137   POTASSIUM 3.8 4.4 3.6   CHLORIDE 106  108 106   CO2 27 26 25   BUN 12 12 14   CR 1.20* 1.00 1.03   ANIONGAP 5 4 5   MK 8.1* 7.2* 6.8*   GLC 85 100* 118*     Most Recent 2 LFT's:  Recent Labs   Lab Test 10/06/20  0612 10/05/20  0548   AST 23 25   ALT 21 20   ALKPHOS 64 62   BILITOTAL 0.8 0.6     Most Recent 3 INR's:  Recent Labs   Lab Test 09/22/20  1708   INR 1.34*   ,   Results for orders placed or performed during the hospital encounter of 10/02/20   CT Head w/o Contrast    Narrative    CT head without contrast 10/2/2020 11:47 AM    History: Altered level of consciousness     Comparison: None    Technique: Using multidetector thin collimation helical acquisition  technique, axial, coronal and sagittal CT images from the skull base  to the vertex were obtained without intravenous contrast.    Findings: There is no intracranial hemorrhage, mass effect, or midline  shift. Ventricles are proportionate to the cerebral sulci. The basal  cisterns are clear. Age-related volume loss. Periventricular and  subcortical hypodensities, likely chronic small vessel ischemic  disease. Chronic lacunar infarct in the right internal capsule. Cavum  septum pellucidum. Atherosclerotic calcifications of the basilar and  carotid arteries.    The bony calvaria and the bones of the skull base are normal. The  visualized portions of the paranasal sinuses and mastoid air cells are  clear.       Impression    Impression:  No acute intracranial pathology. Chronic small vessel ischemic disease  age-related cerebral volume loss.    I have personally reviewed the examination and initial interpretation  and I agree with the findings.    JAKE ARRIAGA MD   XR Chest 2 Views    Narrative    XR CHEST 2 VW  10/5/2020 6:49 PM      HISTORY: Sepsis    COMPARISON: Chest x-ray 1417, chest CT 3/31/2006    TECHNIQUE: Frontal and lateral views of the chest.    FINDINGS: Bibasilar streaky opacities.. Calcified granuloma left lower  lobe stable from previous CT. No pneumothorax or significant  pleural  effusion. Cardiac mediastinal silhouette is unchanged. Trachea is  midline. Surgical clips in the upper abdomen. Heavy aortic  calcification. Chronic appearing right-sided rib fracture.  Multiple  compression fractures of the thoracic spine most pronounced at  approximately T4 with greater than 50% height loss as well as multiple  others in the lower thoracic and upper lumbar spine.      Impression    IMPRESSION:   1. Bibasilar streaky opacities favored to be atelectasis.  2. Multiple thoracic compression fractures most pronounced at  approximately T4, some of which are age indeterminate. Recommend  correlation with previous outside imaging if available and patient  tenderness.    I have personally reviewed the examination and initial interpretation  and I agree with the findings.    YAYA WILLINGHAM MD   Echo Complete    Narrative    [id number]  [id number]  [id number]  [id number]^MO^MAME^[facility],[place]  Echocardiography Laboratory  [address] [place] [id number]     Name: LESLI CARMONA IN  MRN: [id number]  : 1927  Study Date: 10/02/2020 03:54 PM  Age: 89 yrs  Gender: Female  Patient Location: [facility]  Reason For Study: Abn EKG  Ordering Physician: MAME HASSAN  Performed By: Ryder Arthur     BSA: 1.3 m2  Height: 57 in  Weight: 87 lb  HR: 75  BP: 127/89 mmHg  _____________________________________________________________________________  __        Procedure  Complete Portable Echo Adult.  _____________________________________________________________________________  __        Interpretation Summary  Global and regional left ventricular function is normal with an EF of 55-60%.  Right ventricular function, chamber size, wall motion, and thickness are  normal.  The inferior vena cava was normal in size with preserved respiratory  variability.  No significant valvular abnormalities were noted.  No pericardial effusion is present.  Previous study not available  for comparison.     _____________________________________________________________________________  __        Left Ventricle  Left ventricular size is normal. Global and regional left ventricular function  is normal with an EF of 55-60%. Relative wall thickness is increased  consistent with concentric remodeling. Left ventricular diastolic function is  indeterminate.     Right Ventricle  Right ventricular function, chamber size, wall motion, and thickness are  normal.     Atria  The atria cannot be assessed.     Mitral Valve  Mild mitral annular calcification is present. Chordal calcification noted.  Trace mitral insufficiency is present.        Aortic Valve  The valve leaflets are not well visualized. Trace aortic insufficiency is  present.     Tricuspid Valve  The tricuspid valve is normal. Trace tricuspid insufficiency is present.     Pulmonic Valve  The pulmonic valve is normal. Trace pulmonic insufficiency is present.     Vessels  The aorta root is normal. The inferior vena cava was normal in size with  preserved respiratory variability. IVC diameter <2.1 cm collapsing >50% with  sniff suggests a normal RA pressure of 3 mmHg.     Pericardium  No pericardial effusion is present.        Compared to Previous Study  Previous study not available for comparison.  _____________________________________________________________________________  __  MMode/2D Measurements & Calculations     IVSd: 1.2 cm  LVIDd: 3.4 cm  LVIDs: 2.1 cm  LVPWd: 1.3 cm  FS: 37.9 %  LV mass(C)d: 143.3 grams  LV mass(C)dI: 113.5 grams/m2  RWT: 0.80           _____________________________________________________________________________  __           Report approved by: Jaylan WOOD 10/02/2020 04:58 PM            Pending Results   These results will be followed up by hospice if needed  Unresulted Labs Ordered in the Past 30 Days of this Admission     Date and Time Order Name Status Description    10/5/2020 1608 Blood culture Preliminary      10/5/2020 1608 Blood culture Preliminary              Primary Care Physician   Jesica Ayala    Discharge Disposition   Discharged to assisted living for hospice cares  Condition at discharge: Terminal but stable, unchanged from admission, plan for hospice cares    Discharge Orders      General info for SNF    Length of Stay Estimate: Long Term Care  Condition at Discharge: Stable  Level of care:board and care  Rehabilitation Potential: n/a, discharging on hospice  Admission H&P remains valid and up-to-date: Yes  Recent Chemotherapy: N/A  Use Nursing Home Standing Orders: If applicable, but please refer to hospice care team orders to ensure cares are in line with comfort measures and hospice plan.     Mantoux instructions    Give two-step Mantoux (PPD) Per Facility Policy Yes     Reason for your hospital stay    You were admitted with concern regarding low weight, poor nutrition and worsening memory and ability to care for yourself at home. During your hospitalization, you remained stable, and your family helped to decide to transition your cares to focus on comfort and quality of life rather than ongoing treatment and diagnosis of health issues. You are being discharged to an assisted living facility where you will receive comfort cares with hospice.     Additional Discharge Instructions    Please confer with hospice team regarding plans for cares which should focus on comfort and quality of life.     Activity - Up with nursing assistance     Adult [facility] Follow-up and recommended labs and tests    Follow-up with hospice team as indicated.     Appointments on [place] and/or [facility] (with [facility] or [facility] provider or service). Call [phone] if you haven't heard regarding these appointments within 7 days of discharge.     No CPR- Do NOT Intubate     Fall precautions     Advance Diet as Tolerated    Follow this diet upon discharge: Orders Placed This Encounter      Snacks/Supplements Adult: Other;  Boost Plus @ 10, Boost Breeze @ 2, and Gelatein @ HS. Pt may prefer oral supplements at room temperature.; Between Meals      Combination Diet Regular Diet Adult    Diet for comfort and enjoyment, not nutritional status improvement.     Discharge Medications   Current Discharge Medication List      START taking these medications    Details   acetaminophen (TYLENOL) 325 MG/10.15ML solution Take 20.3 mLs (650 mg) by mouth every 4 hours as needed for mild pain or fever  Qty:      Associated Diagnoses: Chronic bilateral low back pain without sciatica; Chronic pain of both knees; Primary osteoarthritis of both knees      alum & mag hydroxide-simethicone (MAALOX  ES) 400-400-40 MG/5ML SUSP suspension Take 30 mLs by mouth every 4 hours as needed for indigestion or heartburn  Qty:      Associated Diagnoses: Gastro-esophageal reflux disease without esophagitis      mirtazapine (REMERON SOL-TAB) 15 MG ODT 1 tablet (15 mg) by Orally disintegrating tablet route At Bedtime  Qty:      Associated Diagnoses: Mild recurrent major depression (H)         CONTINUE these medications which have CHANGED    Details   polyethylene glycol (MIRALAX) 17 GM/Dose powder Take 17 g (1 capful) by mouth daily as needed    Associated Diagnoses: Constipation, unspecified constipation type      polyethylene glycol-propylene glycol (SYSTANE ULTRA) 0.4-0.3 % SOLN ophthalmic solution Place 1 drop into both eyes 4 times daily as needed for dry eyes  Qty: 1 Bottle, Refills: 11    Associated Diagnoses: Ulcerative blepharitis of upper and lower eyelids of both eyes         CONTINUE these medications which have NOT CHANGED    Details   capsaicin (ZOSTRIX) 0.025 % external cream Apply topically 3 times daily      diclofenac (VOLTAREN) 1 % topical gel Place 2 g onto the skin 4 times daily as needed for moderate pain Apply to knees, hips, and shoulders  Qty: 100 g, Refills: 3    Associated Diagnoses: Chronic bilateral low back pain without sciatica; Chronic pain  "of both knees      Ginkgo Biloba (GINKOBA PO) Take 1 capsule by mouth every evening      HYPROMELLOSE-DEXTRAN 0.3-0.1% opthalmic solution INSTILL TWO DROPS INTO BOTH EYES EVERY 6 HOURS IF NEEDED FOR DRY EYES.  Qty: 30 mL, Refills: 3    Associated Diagnoses: Insufficiency of tear film of both eyes      lidocaine (LMX4) 4 % external cream Apply topically once as needed for mild pain  Qty: 120 g, Refills: 1    Associated Diagnoses: Other osteomyelitis of left foot (H)      LUTEIN PO Take 1 capsule by mouth every morning      Nutritional Supplements (ENSURE ORIGINAL) LIQD Take 1 Bottle by mouth 3 times daily (with meals) as desired by patient  Qty: 237 mL, Refills: 90    Associated Diagnoses: Loss of weight         STOP taking these medications       acetaminophen (TYLENOL) 500 MG tablet Comments:   Reason for Stopping:         ASPIRIN NOT PRESCRIBED, INTENTIONAL, Comments:   Reason for Stopping:         ciclopirox (PENLAC) 8 % external solution Comments:   Reason for Stopping:         doxycycline hyclate (VIBRA-TABS) 100 MG tablet Comments:   Reason for Stopping:         famotidine (PEPCID) 20 MG tablet Comments:   Reason for Stopping:         LANsoprazole (PREVACID) 30 MG DR capsule Comments:   Reason for Stopping:         levofloxacin (LEVAQUIN) 750 MG tablet Comments:   Reason for Stopping:         loratadine (CLARITIN) 10 MG tablet Comments:   Reason for Stopping:         losartan (COZAAR) 50 MG tablet Comments:   Reason for Stopping:         oxyCODONE (ROXICODONE) 5 MG tablet Comments:   Reason for Stopping:         potassium chloride ER (KLOR-CON M) 10 MEQ CR tablet Comments:   Reason for Stopping:         sertraline (ZOLOFT) 25 MG tablet Comments:   Reason for Stopping:         triamcinolone (KENALOG) 0.1 % external cream Comments:   Reason for Stopping:         Wound Dressings (HYDROFERA BLUE 6\"X6\") PADS Comments:   Reason for Stopping:             Allergies   No Known Allergies    "

## 2020-10-08 NOTE — PROVIDER NOTIFICATION
Paged [facility]: Pt has had several watery stools overnight and this AM. Do you want enteric iso and sample? Thanks

## 2020-10-08 NOTE — PROGRESS NOTES
Care Management Discharge Note    Discharge Planning:  Expected Discharge Date: 10/08/20     Concerns to be Addressed: mental health       Anticipated Discharge Disposition:    10/8/20 at 2pm via  [facility] w/c transport    Located within [facility]  [address]  Attn: Mrs. Dexter [phone]    Anticipated Discharge Services:  [facility]  Anticipated Discharge DME:      Patient/family educated on Medicare website which has current facility and service quality ratings:  Yes  Referrals Placed by CM/SW:  Yes  Education Provided on the Discharge Plan:  Yes  Patient/Family in Agreement with the Plan:  Yes     Disposition Comments:    Pt's daugther Keyla and pt agreeable to the following plan:  Pt will admit to Located [facility] 10/8/20 at 2pm via  [facility] w/c.  On 10/16/20, Pt's daughter will contact [facility] for transporting pt from [facility] to:    [place] [address]  Attn: Mrs. Paulino [phone]    LILIYA spoke with Mrs. Paulino who verified facility can accept pt from [facility] on 10/16/20.      Additional Information:  LILIYA spoke with Mrs. Dexter of [facility] who verified facility can admit pt with family providing cares.    LILIYA spoke with Keyla who elected [facility] for pt. LILIYA spoke with Sarah of [facility] [phone] who requested facesheet and physician orders with Hospice orders included. LILIYA faxed these documents to [phone].     LILIYA then met with pt and Keyla at bedside. Pt signed IMM; she and daughter agreeable with discharge plan. Keyla mentioned Sarah just spoke with her, and is planning to f/u about referral with her tomorrow morning. Daughter inquired about transport from [facility] to [facility] on 10/16/20. LILIYA relayed message from [facility] that family would need to call company on day of transport, and if they are not available for transport, EMS will provide alternative transport company/s contact info. LILIYA provided  daughter with [facility] phone number. Daughter requested to speak with MD re: pt having loose stools. MD Linda Chang then entered room and informed of recent anti-diarrhea medication added to pt's med regimen. After MD informed that [facility] will f/u with daughter in morning, and family will receive that support, MD confirmed with pt's discharge plan.     ALEIDA Devine, [facility]  Acute Care Float   [facility]       ALEIDA Beaver

## 2020-10-08 NOTE — PLAN OF CARE
A:  patient denies pain.  Up with assist to bedside commode. Voided times one.  Very hard of hearing.  Moves self well in bed.  Bed alarm on as patient forgets to call for help.  R:  continue to monitor and treat per plan of care.

## 2020-10-08 NOTE — PLAN OF CARE
A:  patient resting well.  Complains of being cold and warm blankets applied.  Up tp bedside commode with one assist and walker times three and had a large loose stool and voiding.  Confused at times and speaking Tajik at times.  Makes needs known.  Denies pain.  R:  continue to monitor and treat per plan of care.

## 2020-10-08 NOTE — PROGRESS NOTES
Care Management Follow Up Note    Length of Stay (days) 6    Patient plan of care discussed at Interdisciplinary Rounds: yes  Expected Discharge Date: 10/08/20  Concerns to be Addressed: mental health       Anticipated Discharge Disposition:  TBD  Anticipated Discharge Services:  Hospice Services  Anticipated Discharge DME:      Plan:  LILIYA received VM from pt's daughter Keyla that [facility] in [place] is not able to accept pt today.  LILIYA received update from [facility]  Hospice liaison, that as of last evening,  Hospice is not taking any new pt's.    LILIYA spoke with Keyla who informed that facility is no longer accepting pt as they have not adequately reviewed her chart and can not verify they are able to meet her needs. Keyla stated she and family now prefer original plan from yesterday: Pt returning to [facility] and receive family assistance with the intention of discharging to [facility] in [place] on 10/16/20.     LILIYA then spoke with Mrs. Paulino of [facility] [phone] who verified what daughter said and requested paperwork of physician. LILIYA faxed unsigned physician orders to [phone].    LILIYA then spoke with Mrs. Dexter of [facility] [phone] to inquire if [facility] can admit pt today and which Hospice agency/ies they typically work with. Mrs. Dexter requested referral; LILIYA faxed initial referral to [phone]. Mrs. Dexter stated she will discuss with nurse and f/u with LILIYA later. Mrs. Dexter also stated they typically work with [facility]. LILIYA inquired about any other agencies; Mrs. Dexter stated she will inquire and f/u with LILIYA.    ALEIDA Devine, [facility]  Acute Care Float   [facility]       ALEIDA Beaver

## 2020-10-14 NOTE — PROGRESS NOTES
"When opening a documentation only encounter, be sure to enter in \"Chief Complaint\" Forms and in \" Comments\" Title of form, description if needed.    Diana is a 89 year old  female  Form received via: Fax  Form now resides in: Provider Ready    Suzanne Mccoy CMA              Form has been completed by provider.     Form sent out via: Fax to [facility] at Fax Number: [phone]  Patient informed: na  Output date: October 16, 2020    Suzanne Mccoy CMA      **Please close the encounter**        "

## 2020-10-16 NOTE — PROGRESS NOTES
"When opening a documentation only encounter, be sure to enter in \"Chief Complaint\" Forms and in \" Comments\" Title of form, description if needed.    Diana is a 89 year old  female  Form received via: Fax  Form now resides in: Provider Cammy Mccoy CMA              Patient has a video visit scheduled for November 9th at 2:20pm to fill out the forms  "

## 2020-10-16 NOTE — PROGRESS NOTES
"When opening a documentation only encounter, be sure to enter in \"Chief Complaint\" Forms and in \" Comments\" Title of form, description if needed.    Diana is a 89 year old  female  Form received via: Fax  Form now resides in: Provider Cammy Mccoy CMA                  "

## 2020-10-19 NOTE — Clinical Note
Care coordination- could we call next week around halloween or so to see if PT/OT have assessed patient and also aid in helping family get assessed for PCA. Also if this is y'alls ball or care coordination giving daughter resources on exploring long term care facilities in the area.

## 2020-10-19 NOTE — PROGRESS NOTES
"  Jatin Ayala is a 89 year old female who is being evaluated via a billable video visit.      The patient has been notified of following:     \"This video visit will be conducted via a call between you and your physician/provider. We have found that certain health care needs can be provided without the need for an in-person physical exam.  This service lets us provide the care you need with a video conversation.  If a prescription is necessary we can send it directly to your pharmacy.  If lab work is needed we can place an order for that and you can then stop by our lab to have the test done at a later time.    Video visits are billed at different rates depending on your insurance coverage.  Please reach out to your insurance provider with any questions.    If during the course of the call the physician/provider feels a video visit is not appropriate, you will not be charged for this service.\"    Patient has given verbal consent for Video visit? Yes  Daughter also w Diana    Subjective     Jatin Ayala is a 89 year old female who presents today via video visit for the following health issues:    HPI       Now living in Assisted living apartment [facility]; same as prior to hospital stay. During hospital stay she had DALILA, acute metabolic encephalopathy, intermittent urinrary retention, chronic osteo (completed abx) among other problems see 10.8 note from Dr. Chang for more details. Originally, plan was to go home for comfort cares/hospice. But patient more and more alert, eating much better, walking on own w assistant to bathroom since. Hospice came in Tuesday. They don't think she is ready for hospice.     Now that she is home without medications, antibiotics, etc. She is back to eating/drinking. 99 lbs now.    Some concerns about water retention. Took lasix today, also tylenol occasionally and pepto. Daughter being w her 24/7/ Seems to realize anxiety makes big difference with pain. Can't walk on her " own well still very difficult to walk well . Couldn't cook, dress, shower her self if she had to.     [facility] PT/OT could this go back to being started?   Jamie considers [facility]. Assisted living, don't have capacity for 1:1 care she gets currently from [facility]. Daughters time off is coming to end.       Needs are breakfast in AM, getting dressed. For now she can take care of getting up restroom etc. But someone in morning would be helpful. Looking into bedside commode. Also confused at night still frequently so often needs redirecting. Toe doesn't heal completely and is still painful, Can't put shoes on w out causing trouble.     Did have 1 fall 5 days ago. Got up to use restroom and fell at night. Sore back but feeling better.     Does think something for general anxiety that is low dose that could be helpful could be work trying. Remeron worked well in past.     Diarrhea stopped, taste is back after replacing zinc.  .    HTN this week 130-180s. Today systolic 147.     Video Start Time: 1601        Review of Systems   Constitutional, HEENT, cardiovascular, pulmonary, gi and gu systems are negative, except as otherwise noted.      Objective           Vitals:  No vitals were obtained today due to virtual visit.    Physical Exam     GENERAL: Much more alert than my previous time seeing her. Speaking in full sentences, no trouble w interpretation. Well dressed.   RESP: No audible wheeze, cough, or visible cyanosis.  No visible retractions or increased work of breathing.    SKIN: Visible skin clear. No significant rash, abnormal pigmentation or lesions.  NEURO: Cranial nerves grossly intact.  Mentation and speech appropriate for age.  PSYCH: Mentation appears normal, affect normal/bright, judgement and insight intact, normal speech and appearance well-groomed.  +2 edema bilateral legs to just below knee. Unable to see JVP well.       Patient here for hospital follow up with initial plans for hospice  but now improving. Discussed overall goals of care. Diana would like to maximize time reasonably but no heroic efforts. More importantly, wants to do everything she can to stay in [facility]. Her daughter is currently taking time off work to help with this as she needs near 1:1 care based on daughters thoughts. It sounds like a morning PCA and possibly a sitter for night could also be enough. But will have care coordiantion help with arranging for PCA assessment. I also think having social work/care coordination give lists of long term care facilities in area to start exploring would be rucker in case Diana's needs escalate.     Went through medicines 1 by 1 and decided to only do essential ones or ones that cause comfort. Sleep and anxiety are difficult for her and has done well on Remeron in past. Will start at 7.5 dose. Also allergies bad so ok to resume claritin. Her pain overall is doing okay on tylenol and pain creams. Will avoid tramadol or oxyocodone for now. Putting back ensures on .     In regards to edema with escalating Bps I suspect either from CKD and heavier salt and fluid intakes vs heart failure w reserved EF. Will need BMP at follow up in person desi Ayala. For now will start back on lasix only.. Once fluid neutral could consider transitioning back to hydrochlorothiazide (or chlorthalidone) as she has done well on this medicine in the past but would need to be cautious w CKD. Goal of systolics 140-150.     Will place PT orders for home assessment and route to social work/care coordination.       1. Mild recurrent major depression (H)    - mirtazapine (REMERON SOL-TAB) 15 MG ODT; Take 1 tablet (15 mg) by mouth At Bedtime Take 0.5 a tablet at bedtime  Dispense: 30 tablet; Refill: 3  - loratadine (CLARITIN) 10 MG tablet; Take 1 tablet (10 mg) by mouth daily  Dispense: 90 tablet; Refill: 3    2. Loss of weight    - Nutritional Supplements (ENSURE ORIGINAL) LIQD; Take 1 Bottle by mouth 3 times daily (with meals)   Dispense: 237 mL; Refill: 90    3. Edema, unspecified type    - furosemide (LASIX) 20 MG tablet; Take 1 tablet (20 mg) by mouth daily  Dispense: 30 tablet; Refill: 3    4. Stage 3 chronic kidney disease, unspecified whether stage 3a or 3b CKD      5. Chronic bilateral low back pain without sciatica      6. Chronic pain of both knees    Video-Visit Details    Type of service:  Video Visit    Video End Time:1635    Originating Location (pt. Location): Home    Distant Location (provider location):  [facility]     Platform used for Video Visit: Rebit

## 2020-10-23 NOTE — PROGRESS NOTES
Family Medicine Video Visit Note  Diana is being evaluated via a billable video visit.          Assessment and Plan   # Sarcopenia  # Adult failure to thrive (doing better than at time of discharge)  # Osteoporosis  # Recurrent falls  # Lower extremity edema; on lasix  # Needs bedside commode   Pt has the above problems, which makes safe ambulation quite difficult, and given her history of recurrent falls, she is certainly at risk of future falls. Falls of great concern, especially w/ her osteoporosis. Also, she has to urinate frequently 2/2 lasix used to treat lower extremity edema. For these reasons, a bedside commode is indicated and has been ordered. Care coordination is also helping out w/ re-initiating home PT and OT.  - Orders placed for home PT and OT by Dr. Young after their visit on Mon 10/19, care coordination aware and assisting with re-initiating these services  - Documentation in process for bedside commode  - Re-ordered Ensure, 1 bottle TID  - Lasix 20mg BID PRN (also see below)   - Follow-up in 1-2 weeks w/ either home health lab draw or in person lab draw to assess electrolytes and renal function    # Family requests assistance in restarting meds supportive for symptomatic control of chronic conditions    # Acid reflux (history of gastritis w/ hemorrhage): Lansoprazole 30mg ODT qday PRN     # Chronic pain (of both knees and low back pain w/o sciatica): Tylenol 325-650mg q6hr PRN     # Dry eyes/ tear film insufficiency: HYPROMELLOSE-DEXTRAN 0.3-0.1% opthalmic solution. 2 drops in each eye q6hr PRN.    # Labile hypertension: Family concerned with blood pressures up to 180s intermittently. During recent hospitalization (when blood pressures checked at least TID, blood pressure quite labile and ranged from 90s to 170s systolic within single day). Blood pressures >160s are of concern for risk of acute stroke or other end organ damage, but also discussed with family risk of blood pressures being too low.  Decided with family to restart hydrochlorothiazide at half dose of prior.  - Start hydrochlorothiazide 6.25mg daily   - Follow-up w/in 1-2 weeks to re-evaluate     # Lower extremity edema. Suspect possible HFpEF: Uses 20mg PO lasix daily PRN for worsened LE edema or SOB.     # Encounter for forms  Daughter Kat living with Diana 24/7 at [facility] to provide care/supervision w/ ADL and requests for FMLA forms to be filled out. Spent >25 minutes filling these forms and providing supportive documentation. Sent by mail on 10/26 back to Kat. Due to employer by 11/3.       After Visit Information:  Patient chose to view AVS via [employer]      Jesica Ayala MD  I precepted today with Dr. Haque         HPI     Video Start Time: 4:47 PM    Diana presents to clinic today for the following health issues:      Close the loop w/ Dr. Young and Care Coordinator re: reinitiating Home Care PT/OT and home nursing for lab draws. Kat hasn't heard anything from them yet.     Diana doing really well. Great appetite. Sitting upright and looking so strong.     Kat staying at [facility] to help Diana w/ day to day activities. See Dr. Yonug's note.     93# today from 99# on Monday since starting lasix.  Overall, ~90# up from ~80# at time of discharge from hospital. Was up to ~90# after 1 week of eating well at home and swelling wasn't evident at that time.     Got 7.5mg remeron one night. Otherwise not using it. Had some episodes of confusion earlier last week, but since starting lasix, hasn't been having those episodes.      Blood pressures:   10/19: 187/106 at 11:40 AM   10/22: 173/70 at 10 AM   10/23: 132/80s    No shortness of breath/labored breathing since starting lasix on Monday.     Stopped losartan 50mg per day and amlodipine 10mg per day.     Reviewed meds w/ family that pt currently on.     No Known Allergies           Review of Systems:   See HPI         Physical Exam:     There were no vitals taken for this visit.  Estimated  "body mass index is 18.45 kg/m  as calculated from the following:    Height as of 12/31/19: 1.45 m (4' 9.09\").    Weight as of 10/6/20: 38.8 kg (85 lb 8 oz).    General: Patient sitting upright unsupported and looking much better than during recent hospitalization. Lively and engaged. Speaks up occasionally, but also speaks out of turn at times and seems anxious to get somewhere else/do something else.   Respiratory: Seems to be breathing comfortably on room air and talks w/o labor.   Neuro: Alert. Oriented to person. Didn't assess further orientation. Speaks in broken English ([place] is native language).          Video Visit Consent     Patient was verbally read the following and verbal consent was obtained.  \"Video visits are billed at different rates depending on your insurance coverage. During this emergency period, for some insurers they may be billed the same as an in-person visit.  Please reach out to your insurance provider with any questions.  If during the course of the call the physician/provider feels a video visit is not appropriate, you will not be charged for this service.\"     (Name person giving consent:  Patient   Date verbal consent given:  10/23/2020  Time verbal consent given:  4:25 PM)    Patient would like the video invitation sent by: Send to e-mail at: [email or web address]    Video-Visit Details    Type of service:  Video Visit    Video End Time (time video stopped): After 5PM    Originating Location (pt. Location): Assisted Living    Distant Location (provider location):  [facility]     Platform used for Video Visit: Tammy"

## 2020-10-23 NOTE — Clinical Note
Hollis Haque. Sorry this note is delayed. Please see chart as well. I spoke with their family quite a bit between last visit and today. Opened up chart today to write note and saw that she  early this morning.

## 2020-10-23 NOTE — PATIENT INSTRUCTIONS
[facility]  [phone]  Referral auto sent to [facility], they will review and contact patient/family directly.

## 2020-10-26 NOTE — TELEPHONE ENCOUNTER
Central Prior Authorization Team  Phone: [phone]    PA Initiation    Medication: LANsoprazole (PREVACID SOLUTAB) 30 MG ODT  Insurance Company: [employer] - Phone [phone] Fax [phone]  Pharmacy Filling the Rx: [id number] - [place], [place] - [address]  Filling Pharmacy Phone: [phone]  Filling Pharmacy Fax:    Start Date: 10/26/2020

## 2020-10-26 NOTE — TELEPHONE ENCOUNTER
Prior Authorization Approval    Authorization Effective Date: 9/26/2020  Authorization Expiration Date: 10/26/2021  Medication: LANsoprazole (PREVACID SOLUTAB) 30 MG ODT- APPROVED   Approved Dose/Quantity:   Reference #:     Insurance Company: [employer] - Phone [phone] Fax [phone]  Expected CoPay:       CoPay Card Available:      Foundation Assistance Needed:    Which Pharmacy is filling the prescription (Not needed for infusion/clinic administered): [id number] - [facility] [address]  Pharmacy Notified: Yes  Patient Notified:  **Instructed pharmacy to notify patient when script is ready to /ship.**

## 2020-10-26 NOTE — TELEPHONE ENCOUNTER
Prior Authorization Retail Medication Request    Medication/Dose: LANsoprazole (PREVACID SOLUTAB) 30 MG ODT  ICD code (if different than what is on RX):  Gastro-esophageal reflux disease without esophagitis [K21.9]  - Primary   Previously Tried and Failed:  See Chart  Rationale:  See Chart    Insurance Name:  [employer]  Insurance ID:  [id number]       Pharmacy Information (if different than what is on RX)  Name:  [facility]  Phone:  [phone]

## 2020-10-26 NOTE — PROGRESS NOTES
"When opening a documentation only encounter, be sure to enter in \"Chief Complaint\" Forms and in \" Comments\" Title of form, description if needed.    Diana is a 89 year old  female  Form received via: Patient Drop Off  Form now resides in: Provider Ready    Suzanne Mccoy CMA                Form has been completed by provider.     Form sent out via: Mailed to patient  Patient informed: na  Output date: October 26, 2020    Suzanne Mccyo CMA      **Please close the encounter**      "

## 2020-10-27 PROBLEM — R41.82 ALTERED MENTAL STATUS, UNSPECIFIED ALTERED MENTAL STATUS TYPE: Status: RESOLVED | Noted: 2020-01-01 | Resolved: 2020-01-01

## 2020-10-27 PROBLEM — R29.6 RECURRENT FALLS: Status: ACTIVE | Noted: 2020-01-01

## 2020-10-27 NOTE — PROGRESS NOTES
Preceptor Attestation:   Patient seen and evaluated via video visit. I discussed the patient with the resident. I have verified the content of the note, which accurately reflects my assessment of the patient and the plan of care.   Supervising Physician:  Mario Alberto Ramsay MD.

## 2020-10-29 NOTE — TELEPHONE ENCOUNTER
Spoke with Jero who stated that the patient is starting to have more frequent anxiety-attack-like episodes a few mornings a week where she has audible wheezing. She stated that it's usually right before they give patient her Lasix. A couple hours after Lasix dose the wheezing improves. They would like to know if Dr. Ayala would prescribe an albuterol inhaler for times when patient is wheezing and would like to know if she could increase Lasix up from 20mg. They are still holding amlodipine and patient's blood pressure has been high on occasion--around 150s. Patient is scheduled for an appointment on 11/09.    Please advise, thank you!    Lizzy Smith RN

## 2020-10-29 NOTE — TELEPHONE ENCOUNTER
[facility] phone call message-patient reporting a symptom:     Symptom: SOB    When did symptoms begin? A few days ago; worse today.    Characteristics: (location on body, intensity, what makes it better or worse, associated symptoms):  none    Additional Details: Called 911 this morning, but patient had calmed down and felt better by the time paramedics arrived.    Same Day Visit Offered: Yes, declined    Additional comments: Requesting inhaler due to wheezing; also requesting water pill. Patient uses [facility] in [place].    OK to leave message on voice mail? Yes    Advised patient that RN would call back within 3 hours, unless emergent   Primary language: Swedish      needed? Yes    Call taken on October 29, 2020 at 9:58 AM by Yen Montgomery

## 2020-10-30 PROBLEM — E87.20 LACTIC ACIDOSIS: Status: ACTIVE | Noted: 2020-01-01

## 2020-10-30 PROBLEM — K25.4 GASTROINTESTINAL HEMORRHAGE ASSOCIATED WITH GASTRIC ULCER: Status: ACTIVE | Noted: 2020-01-01

## 2020-10-30 PROBLEM — R06.03 RESPIRATORY DISTRESS: Status: ACTIVE | Noted: 2020-01-01

## 2020-10-30 NOTE — TELEPHONE ENCOUNTER
With regards to bouts of anxiety and wheezing and requests for oxygen and/or albuterol.   It would be best for me to evaluate patient in person to assess these needs.   Could consider anxiety treatment as well, but need more info.   Oxygen won't be covered without test proving she needs oxygen, so oxygen will not be ordered at this time.    Okay with prescribing albuterol to use as needed for wheezing; however, will be important to further examine fluid status, respiratory history, timing of events, etc. Best way to do this will be with in person visit or home care visit.     Plan:  - Routed to Care Coordinator to ask if possible to do home visit with this patient    - Routed to triage RN to call daughter (Kat) to update her   - Advise calling 911 again if pt significantly laboring to breathe or is vitally unstable    Jesica Ayala MD      Addendum:   Did not prescribe albuterol, as heard from RN that patient on way to ED prior to my writing the order.

## 2020-10-30 NOTE — ED NOTES
Bed: ED02  Expected date:   Expected time:   Means of arrival:   Comments:  89yrs old F SOB; Hen 43 Pt was given neb

## 2020-10-30 NOTE — TELEPHONE ENCOUNTER
" requested that I \"contact patient/family to verify that Pt/OT have assessed patient and also aid in helping patient get assessed for PCA services\".  also requested \"giving daughter resources on exploring long term care facilities in the area\".  I contacted Sharri (daughter) and asked if PT/OT has been out. Sharri stated that \"they have not received a call yet\". I called [facility] ([phone]) and they had not received order. [facility] to start processing order and will contact Sharri. I gave Sharri contact information for [place] [facility] ([phone]). She will contact office and get scheduled for home assessment. In regards to long term care facilities, I sent a message to Shakira, . Patient is schedule to see  (PCP) on 11/9/20 @ 2:20p.harley Jay  Care Coordinator  "

## 2020-10-30 NOTE — TELEPHONE ENCOUNTER
Dear ,  [facility] process is that all ordered disciplines will be involved in the development of the plan of care.  The following disciplines were unable to see Jatin In Jamie; MRN [id number] within the 5 day evaluation window.  There will be a delay in the evalation visit by PT.    Please REPLY TO THIS MESSAGE in order to give authorization for orders when needed.  This is considered a verbal order.    Thank you for your timely assistance.    Order for Jatin In Jamie; MRN [id number]  Sincerely [facility]  Rosalia Ramirez, RN  [phone]

## 2020-10-30 NOTE — ED TRIAGE NOTES
"Brought in by EMS from apartment (may have been asst living). Has had shortness of breath and diarrhea since Wednesday. History of \"fluid in lungs and edema in legs\". Has also been having increased anxiety for past 2 months. O2 sats per EMS were 81%, tachypnic, wheezy. Gave neb, came up to 96%.   "

## 2020-10-30 NOTE — TELEPHONE ENCOUNTER
"Called and spoke w/ Kat (daughter) on phone per her request d/t patient being in emergency department currently for shortness of breath, vomiting, diarrhea, and anxiety.     Brief history per Kat    Wed night. Kat didn't sleep over w/ Diana (had been doing so every night) because Kat's sister was staying overnight instead. Diana got very anxious and had labored breathing.  Improved mildly overnight, but worsened again Thurs AM and EMS was called. They assessed her and she improved w/ minimal intervention.    Thursday evening again very anxious and w/ labored breathing. Required O2 that assisted living facility provided. Improved.     Kept fluctuating where she'd be fine on room air, but then get very anxious again and need respiratory support.    Fri AM: Multiple BMs, diarrhea (very watery), \"stomach on fire. Anxiety and panic attack has gone into reality.\" \"Unable to catch her breath. Improved briefly after breakfast, but then worse again after late morning nap. Couldn't catch breath. Stitzer like vomiting.\" At that point they called EMS.     Assessment/Plan:    Diana is being evaluated at [place] [place] ED right now for SOB + stomach pain w/ diarrhea and vomiting. Difficult to come up with full differential without actually seeing or assessing patient, but would certainly rule out HFpEF exacerbation vs flare of erosive gastritis vs pneumonia vs anxiety, among others. Kat seems nervous and discouraged with this set back, because Diana was doing so well for some time.     Kat asked me to call ED providers to update them as well, which I will attempt to do.     Kat also requesting follow-up on [facility] papers. I see they were sent out by mail on 10/26. Will message staff to see if we have copy available that Kat could , as deadline (11/3) is approaching.     Jesica Ayala MD       "

## 2020-10-30 NOTE — TELEPHONE ENCOUNTER
"10/30/20 Spoke to patient daughter (Sharri [phone]) regarding a different need this morning and she mentioned that patient \"was having anxiety and panic attacks and would like to know if the doctor can order oxygen for her\". I explained that I would send a message to provider. Daughter understands.    Estefani Jay  Care Coordinator    "

## 2020-10-30 NOTE — TELEPHONE ENCOUNTER
Called and spoke w/ Dr. Wood, ED provider taking care of Diana Ayala, per request of Kat (Diana's daughter). Planning for admission, to ICU vs [facility], depending on how lactic responds to fluid bolus. CBC shows worsened anemia w/ Hb ~8 from ~10, and MCV up to 104 from 90. Pt currently sating 100% on 4L. Tachycardic to 115. Discussed w/ Dr. Wood how Cathryns blood pressures can be quite labile ranging from  systolic within one day without any intervention.     Called Kat to update her and to verify code staus.     Kat verifies code status as DNR/DNI.     Jesica Ayala MD

## 2020-10-30 NOTE — TELEPHONE ENCOUNTER
See other telephone encounter from today. Patient going to ED right now. Dr. Ayala to contact daughter to discuss plan.   Eliza Thrasher RN

## 2020-10-30 NOTE — PHARMACY-ADMISSION MEDICATION HISTORY
Admission Medication History Completed by Pharmacy    See [facility] Admission Navigator for allergy information, preferred outpatient pharmacy, prior to admission medications and immunization status.     Medication History Sources:     Surescripts, CareEverywhere, and caregiver interview (daughter, Sharri)    Changes made to PTA medication list (reason):    Added: OTCs - lidocaine patches, aspercreme lotion    Deleted: lidocaine cream (replaced by patches)    Changed: hydrochlorothiazide strength (now taking 12.5 mg daily)    Additional Information:    Fill history shows patient has a prescription filled for sertraline 25 mg daily on 9/21/20. Daughter reports they haven't started this medication yet and primary care provider is aware (not added to list).     Prior to Admission medications    Medication Sig Last Dose Taking? Auth Provider   acetaminophen (TYLENOL) 325 MG tablet Take 1-2 tablets (325-650 mg) by mouth every 6 hours as needed for mild pain 10/30/2020 at 1030 Yes Violetta Brown DO   bismuth subsalicylate (PEPTO BISMOL) 262 MG/15ML suspension Take 30 mLs by mouth every 6 hours as needed for indigestion or diarrhea 10/30/2020 at 1130 Yes Fawn Flynn MD   diclofenac (VOLTAREN) 1 % topical gel Place 2 g onto the skin 2 times daily Apply to knees, hips, and shoulders Past Week at Unknown time Yes Violetta Brown DO   famotidine (PEPCID) 10 MG tablet Take 10 mg by mouth daily as needed 10/29/2020 at AM Yes Unknown, Entered By History   furosemide (LASIX) 20 MG tablet Take 1 tablet (20 mg) by mouth daily 10/30/2020 at AM Yes Mario Alberto Ramsay MD   hydrochlorothiazide (HYDRODIURIL) 12.5 MG tablet Take by mouth every morning  10/30/2020 at AM Yes Reported, Patient   HYPROMELLOSE-DEXTRAN 0.3-0.1% opthalmic solution INSTILL TWO DROPS INTO BOTH EYES EVERY 6 HOURS IF NEEDED FOR DRY EYES. 10/30/2020 at AM Yes Violetta Brown DO   KLOR-CON 10 MEQ CR tablet TAKE 2 TABLETS (20 MEQ) BY MOUTH DAILY 10/30/2020 at 1200 Yes  Reported, Patient   LANsoprazole (PREVACID SOLUTAB) 30 MG ODT Take 1 tablet (30 mg) by mouth daily 10/30/2020 at 1200 Yes Violetta Brown DO   Lidocaine (LIDOCARE) 4 % Patch Place 1 patch onto the skin every 24 hours To prevent lidocaine toxicity, patient should be patch free for 12 hrs daily. 10/30/2020 at Has several patches on back per daughter Yes Unknown, Entered By History   loratadine (CLARITIN) 10 MG tablet Take 1 tablet (10 mg) by mouth daily 10/30/2020 at AM Yes Mario Alberto Ramsay MD   Nutritional Supplements (ENSURE ORIGINAL) LIQD Take 1 Bottle by mouth 3 times daily (with meals) 10/30/2020 at AM Yes Ángela Haque DO   polyethylene glycol-propylene glycol (SYSTANE ULTRA) 0.4-0.3 % SOLN ophthalmic solution Place 1 drop into both eyes 4 times daily as needed for dry eyes 10/30/2020 at AM Yes Belen Chang MD   Trolamine Salicylate (ASPERCREME) 10 % LOTN Externally apply topically 4 times daily as needed (knee, hip, shoulder pain)  Past Month at Unknown time Yes Unknown, Entered By History   alum & mag hydroxide-simethicone (MAALOX  ES) 400-400-40 MG/5ML SUSP suspension Take 30 mLs by mouth every 4 hours as needed for indigestion or heartburn More than a month at Unknown time  Belen Chang MD   polyethylene glycol (MIRALAX) 17 GM/Dose powder Take 17 g (1 capful) by mouth daily as needed More than a month  Belen Chang MD       Date completed: 10/30/20    Medication history completed by:     Nicollette McMann, PharmD  [facility] [place]  Emergency Department: Ascom *[phone]

## 2020-10-30 NOTE — ED PROVIDER NOTES
ED Provider Note  [facility]      History     Chief Complaint   Patient presents with     Shortness of Breath     The history is provided by the patient, medical records, the EMS personnel and a relative.     Jatin Ayala is a 89 year old female with a past medical history significant for acute on chronic malnutrition due to poor oral intake w/ BMI 17.6, dementia, chronic osteomyelitis (followed by ID and on doxy/levo, end date 10/7), GERD, and CKD Stage III who presents to the ED today via EMS from assisted living for evaluation of shortness of breath. The patient is accompanied by her eldest daughter and another daughter in the waiting room. Her eldest daughter reports that she currently lives in assisted living but her sister just moved in last month because the patient cannot take care of herself. Her daughter reports that they are looking into a long term care facility. She reports shortness of breath and diarrhea for 3 days. Her daughter states that she ate a bowl of soup today, but before that had not eaten for 24 hours. She reports increasing abdominal pain and vomiting after eating.. She denies blood in the emesis or stool.  She states that she has been short of breath with exertion for about a month now, but it has now become worse, and she is short of breath with rest now. EMS reported her O2 sats at 81%, but it then increased to 96 after a neb. Her daughter reports that she has been on oxygen for the past 3 days, but the tank was empty by the time EMS arrived. She reports that she has a slight cough. Her daughter reports that she has had fluid in her lungs in the past. She denies chest pain, known Covid exposure, abdominal surgery, hematemesis, and fever.     Past Medical History:   Diagnosis Date     CKD (chronic kidney disease) stage 3, GFR 30-59 ml/min      Gastroesophageal reflux disease      Hypertension        Past Surgical History:   Procedure Laterality Date      CATARACT IOL, RT/LT       REPAIR PTOSIS         Family History   Problem Relation Age of Onset     Glaucoma No family hx of      Macular Degeneration No family hx of        Social History     Tobacco Use     Smoking status: Never Smoker     Smokeless tobacco: Never Used   Substance Use Topics     Alcohol use: Not Currently         Past Medical History  Past Medical History:   Diagnosis Date     CKD (chronic kidney disease) stage 3, GFR 30-59 ml/min      Gastroesophageal reflux disease      Hypertension      Past Surgical History:   Procedure Laterality Date     CATARACT IOL, RT/LT       REPAIR PTOSIS            acetaminophen (TYLENOL) 325 MG tablet       bismuth subsalicylate (PEPTO BISMOL) 262 MG/15ML suspension       diclofenac (VOLTAREN) 1 % topical gel       famotidine (PEPCID) 10 MG tablet       furosemide (LASIX) 20 MG tablet       hydrochlorothiazide (HYDRODIURIL) 12.5 MG tablet       HYPROMELLOSE-DEXTRAN 0.3-0.1% opthalmic solution       KLOR-CON 10 MEQ CR tablet       LANsoprazole (PREVACID SOLUTAB) 30 MG ODT       Lidocaine (LIDOCARE) 4 % Patch       loratadine (CLARITIN) 10 MG tablet       Nutritional Supplements (ENSURE ORIGINAL) LIQD       polyethylene glycol-propylene glycol (SYSTANE ULTRA) 0.4-0.3 % SOLN ophthalmic solution       Trolamine Salicylate (ASPERCREME) 10 % LOTN       alum & mag hydroxide-simethicone (MAALOX  ES) 400-400-40 MG/5ML SUSP suspension       polyethylene glycol (MIRALAX) 17 GM/Dose powder      No Known Allergies  Family History  Family History   Problem Relation Age of Onset     Glaucoma No family hx of      Macular Degeneration No family hx of      Social History   Social History     Tobacco Use     Smoking status: Never Smoker     Smokeless tobacco: Never Used   Substance Use Topics     Alcohol use: Not Currently     Drug use: Not Currently      Past medical history, past surgical history, medications, allergies, family history, and social history were reviewed with the  patient. No additional pertinent items.       Review of Systems   Constitutional: Negative for fever.   Respiratory: Positive for cough and shortness of breath.    Cardiovascular: Negative for chest pain.   Gastrointestinal: Positive for abdominal pain, diarrhea and vomiting. Negative for blood in stool.   All other systems reviewed and are negative.    A complete review of systems was performed with pertinent positives and negatives noted in the HPI, and all other systems negative.    Physical Exam   BP: (!) 138/104  Pulse: 115  Temp: 97.2  F (36.2  C)  Resp: 26  SpO2: 100 %       Physical Exam    GEN:  Alert, somewhat cachectic appearing, using accessory muscles to breathe with prolonged expiratory phase  HEENT:  PERRL, EOMI, Mucous membranes are moist.   Cardio:  RRR, no murmur, radial pulses equal bilaterally  PULM:  Lungs have good air movement, no wheezes, rales, however, she has tachypnea and prolonged expiratory phase of breathing.  Abd:  Soft, normal bowel sounds, no focal tenderness  Back exam:  No CVA tenderness  Musculoskeletal:  normal range of motion, no lower extremity swelling or calf tenderness  Neuro:  Alert, Follows commands, moving all extremities spontaneously   Skin:  Warm, dry   ED Course      Procedures  Results for orders placed during the hospital encounter of 10/30/20   POC US ECHO LIMITED    Impression Limited Bedside Cardiac Ultrasound, performed and interpreted by me.   Indication: Shortness of Breath.  Parasternal long axis, parasternal short axis and apical 4 chamber views were acquired.   Image quality was satisfactory.    Findings:    There is no evidence of free fluid within the pericardium. Chambers appear dilated and with poor function, poor squeeze.     IMPRESSION: Abnormal limited cardiac ultrasound showing poor wall motion, poor cardiac function.  No pericardial effusion.                   EKG Interpretation:      Interpreted by Alissa Wood MD  Time reviewed:  15:36  Symptoms at time of EKG: shortness of breath   Rhythm: normal sinus   Rate: normal  Axis: normal  Ectopy: none  Conduction: Prolonged QT, QTC is 556 ms  ST Segments/ T Waves: There are inverted T waves in leads I, aVL, V5 and V6.  This is new compared to previous EKG done October 2, 2020.  Q Waves: none  Comparison to prior: T wave inversions in the lateral leads is new compared to previous EKG    Clinical Impression: Sinus rhythm, low voltage QRS, possible inferolateral ischemia given new T wave inversions, prolonged QT    Labs are shown below.  Chest x-ray was reviewed by me and results are shown below.       Critical Care Addendum    My initial assessment, based on my review of nursing observations, review of vital signs, focused history, physical exam, review of cardiac rhythm monitor, 12 lead ECG analysis, discussion with family, interpretation of labs  and and POC echo, established that Jatin Ayala has respiratory failure and and multi-organ failure with suspected GI bleed, which requires immediate intervention, and therefore she is critically ill.     After the initial assessment, the care team initiated multiple lab tests, initiated IV fluid administration and initiated medication therapy with IV Protonix to provide stabilization care. Due to the critical nature of this patient, I reassessed nursing observations, vital signs, physical exam and mental status multiple times prior to her disposition.     Time also spent performing documentation, discussion with family to obtain medical information for decision making, reviewing test results and coordination of care.     Critical care time (excluding teaching time and procedures): 75 minutes.   Patient was given 500 cc of normal saline for possible dehydration given her poor oral intake with vomiting and diarrhea.  Given her history of gastric ulcers with a drop in her hemoglobin, patient was given IV Protonix and started on a Protonix drip.  She was  maintained on oxygen for respiratory distress.  Patient initially started on nasal cannula oxygen and this was increased to oxime mask oxygen to maintain oxygen saturation.  The Lactic acid level is elevated due to Multiorgan failure, CHF, at this time there is no sign of severe sepsis or septic shock.   Had a discussion with the patient's daughters about goals of care.  They realized that their mother is very frail and unlikely to survive if she has cardiac arrest or would need intubation.  They do not think she would even tolerate BiPAP or CPAP very well.  The daughters would like to continue with medical management and to keep her comfortable.  They would like to try medical management to see if this would improve her situation/health.  They understand that she is critically ill.  After confirming with the patient's daughter, she is DNR/DNI at this time.  Patient is also being treated with albuterol nebulizers to see if this might help with her respiratory distress.     Results for orders placed or performed during the hospital encounter of 10/30/20   XR Chest Port 1 View     Status: None    Narrative    CHEST ONE VIEW  10/30/2020 4:54 PM     HISTORY: Shortness of breath.    COMPARISON: 10/5/2020      Impression    IMPRESSION: Markedly enlarged cardiac silhouette is increased in size  from previous, question a pericardial effusion. Minimal left base  atelectasis and/or infiltrate.    DANA MICHEL MD   POC US ECHO LIMITED     Status: None    Impression    Limited Bedside Cardiac Ultrasound, performed and interpreted by me.   Indication: Shortness of Breath.  Parasternal long axis, parasternal short axis and apical 4 chamber views were acquired.   Image quality was satisfactory.    Findings:    There is no evidence of free fluid within the pericardium. Chambers appear dilated and with poor function, poor squeeze.     IMPRESSION: Abnormal limited cardiac ultrasound showing poor wall motion, poor cardiac function.   No pericardial effusion.         CBC with platelets differential     Status: Abnormal   Result Value Ref Range    WBC 7.5 4.0 - 11.0 10e9/L    RBC Count 2.62 (L) 3.8 - 5.2 10e12/L    Hemoglobin 8.2 (L) 11.7 - 15.7 g/dL    Hematocrit 27.3 (L) 35.0 - 47.0 %     (H) 78 - 100 fl    MCH 31.3 26.5 - 33.0 pg    MCHC 30.0 (L) 31.5 - 36.5 g/dL    RDW 22.1 (H) 10.0 - 15.0 %    Platelet Count 261 150 - 450 10e9/L    Diff Method Automated Method     % Neutrophils 80.4 %    % Lymphocytes 13.4 %    % Monocytes 5.4 %    % Eosinophils 0.0 %    % Basophils 0.3 %    % Immature Granulocytes 0.5 %    Nucleated RBCs 0 0 /100    Absolute Neutrophil 6.1 1.6 - 8.3 10e9/L    Absolute Lymphocytes 1.0 0.8 - 5.3 10e9/L    Absolute Monocytes 0.4 0.0 - 1.3 10e9/L    Absolute Eosinophils 0.0 0.0 - 0.7 10e9/L    Absolute Basophils 0.0 0.0 - 0.2 10e9/L    Abs Immature Granulocytes 0.0 0 - 0.4 10e9/L    Absolute Nucleated RBC 0.0    Comprehensive metabolic panel     Status: Abnormal   Result Value Ref Range    Sodium 144 133 - 144 mmol/L    Potassium 3.8 3.4 - 5.3 mmol/L    Chloride 111 (H) 94 - 109 mmol/L    Carbon Dioxide 17 (L) 20 - 32 mmol/L    Anion Gap 16 (H) 3 - 14 mmol/L    Glucose 164 (H) 70 - 99 mg/dL    Urea Nitrogen 33 (H) 7 - 30 mg/dL    Creatinine 1.67 (H) 0.52 - 1.04 mg/dL    GFR Estimate 26 (L) >60 mL/min/[1.73_m2]    GFR Estimate If Black 30 (L) >60 mL/min/[1.73_m2]    Calcium 6.8 (L) 8.5 - 10.1 mg/dL    Bilirubin Total 1.1 0.2 - 1.3 mg/dL    Albumin 2.8 (L) 3.4 - 5.0 g/dL    Protein Total 6.2 (L) 6.8 - 8.8 g/dL    Alkaline Phosphatase 243 (H) 40 - 150 U/L     (H) 0 - 50 U/L     (H) 0 - 45 U/L   Lipase     Status: None   Result Value Ref Range    Lipase 164 73 - 393 U/L   Troponin I     Status: Abnormal   Result Value Ref Range    Troponin I ES 1.379 () 0.000 - 0.045 ug/L   Lactate for Sepsis Protocol     Status: Abnormal   Result Value Ref Range    Lactate for Sepsis Protocol 8.4 () 0.7 - 2.0 mmol/L   Nt  probnp inpatient     Status: Abnormal   Result Value Ref Range    N-Terminal Pro BNP Inpatient >35,000 (H) 0 - 1,800 pg/mL   EKG 12 lead     Status: None (Preliminary result)   Result Value Ref Range    Interpretation ECG Click View Image link to view waveform and result      Medications   pantoprazole (PROTONIX) 0.8 mg/mL in sodium chloride 0.9 % 50 mL infusion (0.1 mg/kg/hr × 38.8 kg Intravenous New Bag 10/30/20 1742)   morphine (PF) injection 2 mg (has no administration in time range)   albuterol (PROVENTIL) neb solution 2.5 mg (has no administration in time range)   albuterol (PROVENTIL) neb solution 2.5 mg (2.5 mg Nebulization Given 10/30/20 1606)   0.9% sodium chloride BOLUS (500 mLs Intravenous New Bag 10/30/20 1649)   pantoprazole (PROTONIX) IV push injection 40 mg (40 mg Intravenous Given 10/30/20 1735)        Assessments & Plan (with Medical Decision Making)   Patient presents for shortness of breath and she is been having vomiting and diarrhea as well.  Covid test has been sent and the patient will be considered a PUI at this point.  She is quite tachypneic and short of breath and requiring oxygen, however, has no history of COPD or CHF noted in her chart.  Her point-of-care echocardiogram does appear to have poor cardiac function, I suspect she does have acute CHF.  Labs are showing multiorgan failure as well as a drop in hemoglobin.  There are no beds at the  at this time, so patient will be transferred to [facility].  She is not going to ICU at this time because she is DNR/DNI.  Patient's daughter is in agreement with medical management at this time only and trying to keep her comfortable.    I have reviewed the nursing notes. I have reviewed the findings, diagnosis, plan and need for follow up with the patient.    New Prescriptions    No medications on file       Final diagnoses:   Respiratory distress   Gastrointestinal hemorrhage associated with gastric ulcer   Lactic acidosis   Acute  kidney injury (nontraumatic) (H)   I, Bonny Orr, am serving as a trained medical scribe to document services personally performed by Alissa Wood MD, based on the provider's statements to me.     I, Alissa Wood MD, was physically present and have reviewed and verified the accuracy of this note documented by Bonny Orr.      --  Alissa Wood MD  [facility] EMERGENCY DEPARTMENT  10/30/2020     Alissa Wood MD  10/30/20 1919

## 2020-10-31 PROBLEM — I50.9 CHF (CONGESTIVE HEART FAILURE) (H): Status: ACTIVE | Noted: 2020-01-01

## 2020-10-31 NOTE — PLAN OF CARE
DATE & TIME: 10/30 from 2200 to 0730    Cognitive Concerns/ Orientation : A&O x 2, disoriented x time and situation   BEHAVIOR & AGGRESSION TOOL COLOR: Green  CIWA SCORE: N/a   ABNL VS/O2: VSs on 8 litters  except tachycardic and  tachypnic   MOBILITY: Up x 2 assist, daryl risk fall, Pt is feeing pain   PAIN MANAGMENT: c/o epigastric pain , given dilauid x 1 with good improvement  DIET: NPO except med and Ice chips  BOWEL/BLADDER: Moses in placed  ABNL LAB/BG: lactic 8.5 MD is aware, Hgb 8.2, Troponin 21.64 MD is aware.   DRAIN/DEVICES: PIV saline   TELEMETRY RHYTHM:   SKIN: Bruisers  TESTS/PROCEDURES:  D/C DAY/GOALS/PLACE: Discharge pending in progress  OTHER IMPORTANT INFO: Lung sound intermittent expiratory wheezes. COVID rule out, special precaution  maintained.   MD/RN ROUNDING SIGNED OFF D/E SHIFT: N/A  COMMIT TO SIT DONE AND SIGNED OFF Yes

## 2020-10-31 NOTE — PLAN OF CARE
DATE & TIME: 10/31/20 1476-1076               Cognitive Concerns/ Orientation : A&O  BEHAVIOR & AGGRESSION TOOL COLOR: Green  CIWA SCORE: N/a    ABNL VS/O2: VSS on 5LPM NC;  tachycardic and tachypnea  at times  MOBILITY: A2, declined OOB activity, high risk fall  PAIN MANAGMENT: c/o epigastric pain, prn dilaudid PO  DIET: 2gm Na   BOWEL/BLADDER: Moses in place  ABNL LAB/BG: lactic 5.7 MD is aware, Hgb 8.6, Troponin elevated, Covid negative.   DRAIN/DEVICES: PIV saline   TELEMETRY RHYTHM: SR  SKIN: Bruises  TESTS/PROCEDURES: Echo  D/C DAY/GOALS/PLACE: Discharge pending in progress  OTHER IMPORTANT INFO: Seen by cardiology, continue diuresing

## 2020-10-31 NOTE — PROVIDER NOTIFICATION
"MD Notification    Notified Person: MD    Notified Person Name: Danielle    Notification Date/Time: 2:26 PM 10/31/20     Notification Interaction: text page    Purpose of Notification: \"Covid negative. Would you like to retest or can pt come off precautions? Please advise. Thanks.\"    Orders Received: RN received telephone order from Danielle GARCIA to remove patient from special precautions.    Comments:      "

## 2020-10-31 NOTE — PROVIDER NOTIFICATION
"MD Notification    Notified Person: MD    Notified Person Name: Danielle    Notification Date/Time: 7:43 AM 10/31/20     Notification Interaction: text page    Purpose of Notification: \"FYI: \"    Orders Received:    Comments:      "

## 2020-10-31 NOTE — H&P
[facility]    History and Physical  Hospitalist       Date of Admission:  10/30/2020  Date of Service (when I saw the patient): 10/30/20    Assessment & Plan   Jatin Ayala is a 89 year old female who presents with shortness of breath    Symptomatic COVID-19 sent, low suspicion    CHF, suspect acute systolic  NSTEMI  H/o labile hypertension  COVID PUI  [furosemide 20 mg daily]  No known COVID-19 contacts. Echo 10/2/20 with nl EF 55-60%, indeterminate diastolic function and normal RV. Recently restarted back on furosemide 20 mg daily 2/2 edema.  Presented to [place] ED with SOB x 3-4 days as well as diarrhea.  EMS found sats at 81%, improved to 96% after neb. Lactate elevated at 8.4. BNP>35,000. Troponin elevated at 1.379. CXR with markedly enlarged cardiac silhouette, ? Pericardial effusion. POC echo in ED without evidence of free fluid in the pericardium. EKG without acute ST changes. High suspicion for cardiac event with subsequent severe heart failure.   - declined BiPAP  - supplemental O2  - prn albuterol nebs  - telemetry  - hold lisinoprol (elevated creat), hold betablocker (marked wheezing)  - NO heparin given concern for possible GI bleed with drop in hgb  - serial troponins  - echocardiogram  - cardiology consult  - lasix 20 mg IV BID (give 40 mg IV x 1 now)  - repeat lactate x 1 now  - needs goals of care discussion    Concern for GI Bleed  Anemia  Abdominal pain  Elevated LFT's  Worsening PO intake as well as abdominal pain and vomiting after eating. AST/ALT at 213/125, alk phos at 243. WBC normal. Lipase normal. Hgb at discharge 10.4 on 10/6, admit at 8.2. unknown if bleeding at this time, reported positive FITT test in the past. Possible GI bleed vs dilutional from acute CHF.  - serial hgbs  - NPO for now  - pantoprazole 40 mg IV BID   - prn hydromorphone low dose    DALILA  Anion gap acidosis  CKD III  Baseline ~1.2 (but unclear). Creatinine at 1.67 at the time of admission.  Given above, leading suspicion is for decompensated CHF causing DALILA  - avoid nephrotoxins  - repeat labs in the am    Severe malnutrition  Dementia  Failure to thrive  Goals of care  [mirtazapine 15 mg at bedtime, apparently not using]  Recent hospitalization early 10/2 to 10/8/2020  for failure to thrive and delirium. With markedly poor BMI and poor PO intake. The plan at the time of discharge was to go home with hospice cares. Apparently improved and eating much better so hospice revoked. [place] but needs a lot of cares/ assistance. Per ED note 10/30 daughter currently in agreement with medical management at this time and trying to keep patient comfortable.   - pending above eval, may benefit from palliative consult    Chronic osteomyelitis R toe  Was followed by ID, completed course of abx on 10/7 (levo/doxy).   - no acute issues      DVT Prophylaxis: Pneumatic Compression Devices  Code Status: DNR / DNI    Disposition: Expected discharge in 3-4 days.    Yeyo Moura MD  [phone] (P)  Text Page     Primary Care Physician   Dr. Jesica Ayala    Chief Complaint   Shortness of breath    History is obtained from the medical records.  Patient is largely unable to for provide much history secondary to distress as well as likely underlying cognitive disorder    History of Present Illness   Jatin Ayala is a 89 year old female who presents with shortness of breath.  Ms. Ayala was recently hospitalized at [facility] for the severe malnutrition, failure to thrive underlying dementia.  She had actually been discharged on hospice.  However, her appetite and eating improved so she was taken off hospice.  She actually sounds like she is doing fairly well for a little while but then developed shortness of breath.  There are some notes that stated she responded nebulization.  She is also recently restarted on furosemide.  Today presentation she was found by EMS to have sats at 81% which improved  after nebulization.  Lactate was found to be 8.4 and a BNP was greater than 35,000.  She also had an elevated troponin.  Chest x-ray showed a markedly enlarged cardiac silhouette but point-of-care echo in the ED did not show a pericardial effusion.  At the time of visit she is quite agitated and in distress but it appears mostly because she has some bleeding around her IV site and she is worried about the blood loss because she does not have enough blood.  She is also pointing to her abdomen for pain but she states that she has had pain there for 50 years.  Further history is difficult to obtain.    Past Medical History    I have reviewed this patient's medical history and updated it with pertinent information if needed.   Past Medical History:   Diagnosis Date     CKD (chronic kidney disease) stage 3, GFR 30-59 ml/min      Gastroesophageal reflux disease      Hypertension        Past Surgical History   I have reviewed this patient's surgical history and updated it with pertinent information if needed.  Past Surgical History:   Procedure Laterality Date     CATARACT IOL, RT/LT       REPAIR PTOSIS         Prior to Admission Medications   Prior to Admission Medications   Prescriptions Last Dose Informant Patient Reported? Taking?   HYPROMELLOSE-DEXTRAN 0.3-0.1% opthalmic solution 10/30/2020 at am  No Yes   Sig: INSTILL TWO DROPS INTO BOTH EYES EVERY 6 HOURS IF NEEDED FOR DRY EYES.   KLOR-CON 10 MEQ CR tablet 10/30/2020 at am  Yes Yes   Sig: TAKE 2 TABLETS (20 MEQ) BY MOUTH DAILY   LANsoprazole (PREVACID SOLUTAB) 30 MG ODT 10/30/2020 at 1200  No Yes   Sig: Take 1 tablet (30 mg) by mouth daily   Lidocaine (LIDOCARE) 4 % Patch 10/30/2020 at am  Yes Yes   Sig: Place 1 patch onto the skin every 24 hours To prevent lidocaine toxicity, patient should be patch free for 12 hrs daily.   Nutritional Supplements (ENSURE ORIGINAL) LIQD 10/30/2020 at am  No Yes   Sig: Take 1 Bottle by mouth 3 times daily (with meals)   Trolamine  Salicylate (ASPERCREME) 10 % LOTN Past Month at Unknown time  Yes Yes   Sig: Externally apply topically 4 times daily as needed (knee, hip, shoulder pain)    acetaminophen (TYLENOL) 325 MG tablet 10/30/2020 at Unknown time  No Yes   Sig: Take 1-2 tablets (325-650 mg) by mouth every 6 hours as needed for mild pain   alum & mag hydroxide-simethicone (MAALOX  ES) 400-400-40 MG/5ML SUSP suspension More than a month at Unknown time  No No   Sig: Take 30 mLs by mouth every 4 hours as needed for indigestion or heartburn   bismuth subsalicylate (PEPTO BISMOL) 262 MG/15ML suspension 10/30/2020 at Unknown time  No Yes   Sig: Take 30 mLs by mouth every 6 hours as needed for indigestion or diarrhea   diclofenac (VOLTAREN) 1 % topical gel Past Week at Unknown time  Yes Yes   Sig: Place 2 g onto the skin 2 times daily Apply to knees, hips, and shoulders   famotidine (PEPCID) 10 MG tablet 10/29/2020 at am  Yes Yes   Sig: Take 10 mg by mouth daily as needed   furosemide (LASIX) 20 MG tablet 10/30/2020 at am  No Yes   Sig: Take 1 tablet (20 mg) by mouth daily   hydrochlorothiazide (HYDRODIURIL) 12.5 MG tablet 10/30/2020 at am  Yes Yes   Sig: Take by mouth every morning    loratadine (CLARITIN) 10 MG tablet 10/30/2020 at am  No Yes   Sig: Take 1 tablet (10 mg) by mouth daily   polyethylene glycol (MIRALAX) 17 GM/Dose powder More than a month at Unknown time  No No   Sig: Take 17 g (1 capful) by mouth daily as needed   polyethylene glycol-propylene glycol (SYSTANE ULTRA) 0.4-0.3 % SOLN ophthalmic solution 10/30/2020 at am  No Yes   Sig: Place 1 drop into both eyes 4 times daily as needed for dry eyes      Facility-Administered Medications: None     Allergies   No Known Allergies    Social History   I have reviewed this patient's social history and updated it with pertinent information if needed. Jatin Pearson Ayala  reports that she has never smoked. She has never used smokeless tobacco. She reports previous alcohol use. She reports previous  drug use.    Family History   I have reviewed this patient's family history and updated it with pertinent information if needed.   Family History   Problem Relation Age of Onset     Glaucoma No family hx of      Macular Degeneration No family hx of        Review of Systems   The 10 point Review of Systems is largely not obtainable at this time 2/2 pt condition    Physical Exam   Temp: 97.3  F (36.3  C) Temp src: Axillary BP: (!) 150/92 Pulse: 123   Resp: 24        Vital Signs with Ranges  0 lbs 0 oz    Constitutional: alert, agitated, moderate respiratory distress  Eyes: EOMI, PERRL  HEENT: OP clear  Respiratory: diffuse wheezing with moderate diminished air movement  Cardiovascular: tachy, 1+ edema  GI: soft, doesn't appear tender  Lymph/Hematologic: no cervical LAD  Genitourinary: deferred  Skin: no rashes or lesions grossly  Musculoskeletal: no deformities or arthritis  Neurologic: CN II-XII, MELENDEZ  Psychiatric: anxious    Data   Data reviewed today:  I personally reviewed the EKG tracing showing no acute ST changes.CXR with enlarged cardiac silhouette. POC echo with no effusion  Recent Labs   Lab 10/30/20  1555   WBC 7.5   HGB 8.2*   *         POTASSIUM 3.8   CHLORIDE 111*   CO2 17*   BUN 33*   CR 1.67*   ANIONGAP 16*   MK 6.8*   *   ALBUMIN 2.8*   PROTTOTAL 6.2*   BILITOTAL 1.1   ALKPHOS 243*   *   *   LIPASE 164   TROPI 1.379*       Recent Results (from the past 24 hour(s))   XR Chest Port 1 View    Narrative    CHEST ONE VIEW  10/30/2020 4:54 PM     HISTORY: Shortness of breath.    COMPARISON: 10/5/2020      Impression    IMPRESSION: Markedly enlarged cardiac silhouette is increased in size  from previous, question a pericardial effusion. Minimal left base  atelectasis and/or infiltrate.    DANA MICHEL MD   POC US ECHO LIMITED    Impression    Limited Bedside Cardiac Ultrasound, performed and interpreted by me.   Indication: Shortness of Breath.  Parasternal long  axis, parasternal short axis and apical 4 chamber views were acquired.   Image quality was satisfactory.    Findings:    There is no evidence of free fluid within the pericardium. Chambers appear dilated and with poor function, poor squeeze.     IMPRESSION: Abnormal limited cardiac ultrasound showing poor wall motion, poor cardiac function.  No pericardial effusion.

## 2020-10-31 NOTE — PROVIDER NOTIFICATION
"MD Notification    Notified Person: MD    Notified Person Name: Dr Tran     Notification Date/Time:10/30 at 235    Notification Interaction: Updated in person     Purpose of Notification: Lactic  8.5 and troponin 2.164  wondering an order?     Comments:No RRT was done, consulted with adimitting provider. Provider does not feel pt needs RRT. Per Dr tran \" Pt has hx of CH, unable to bolus. STAT Lasix ordered. Recheck BNP and lactic order in placed.   "

## 2020-10-31 NOTE — PROGRESS NOTES
[facility]    Hospitalist Progress Note    Date of Service (when I saw the patient): 10/31/2020    Assessment & Plan   Jatin In Jamie is a 89 year old female who was admitted on 10/30/2020.  Assessment & Plan     Jatin In Jamie is a 89 year old female who presents with shortness of breath     Symptomatic COVID-19 sent, low suspicion     CHF, suspect acute systolic  NSTEMI  H/o labile hypertension  COVID PUI  [furosemide 20 mg daily]  No known COVID-19 contacts. Echo 10/2/20 with nl EF 55-60%, indeterminate diastolic function and normal RV. Recently restarted back on furosemide 20 mg daily 2/2 edema.  Presented to [facility] ED with SOB x 3-4 days as well as diarrhea.  EMS found sats at 81%, improved to 96% after neb. Lactate elevated at 8.4. BNP>35,000. Troponin elevated at 1.379. CXR with markedly enlarged cardiac silhouette, ? Pericardial effusion. POC echo in ED without evidence of free fluid in the pericardium. EKG without acute ST changes. High suspicion for cardiac event with subsequent severe heart failure.   - declined BiPAP  - supplemental O2  - prn albuterol nebs  - telemetry  - hold lisinoprol (elevated creat), hold betablocker (marked wheezing)  - NO heparin given concern for possible GI bleed with drop in hgb  - serial troponins  - echocardiogram  - cardiology consult placed   - lasix 20 mg IV BID (give 40 mg IV x 1 now)  - needs goals of care discussion     Concern for GI Bleed  Anemia  Abdominal pain  Elevated LFT's  Worsening PO intake as well as abdominal pain and vomiting after eating. AST/ALT at 213/125, alk phos at 243. WBC normal. Lipase normal. Hgb at discharge 10.4 on 10/6, admit at 8.2. unknown if bleeding at this time, reported positive FITT test in the past. Possible GI bleed vs dilutional from acute CHF.  - serial hgbs  - pantoprazole 40 mg IV BID   - prn hydromorphone low dose  No active bleeding noted   hgb stable at 8.0      DALILA  Anion gap acidosis  CKD III  Baseline  ~1.2 (but unclear). Creatinine at 1.67 at the time of admission. Given above, leading suspicion is for decompensated CHF causing DALILA  - avoid nephrotoxins  Cr worse today at 2.34. continue to monitor closely      Severe malnutrition  Dementia  Failure to thrive  Goals of care  [mirtazapine 15 mg at bedtime, apparently not using]  Recent hospitalization early 10/2 to 10/8/2020  for failure to thrive and delirium. With markedly poor BMI and poor PO intake. The plan at the time of discharge was to go home with hospice cares. Apparently improved and eating much better so hospice revoked. [place] but needs a lot of cares/ assistance. Per ED note 10/30 daughter currently in agreement with medical management at this time and trying to keep patient comfortable.   - pending above eval, may benefit from palliative consult     Chronic osteomyelitis R toe  Was followed by ID, completed course of abx on 10/7 (levo/doxy).   - no acute issues        DVT Prophylaxis: Pneumatic Compression Devices  Code Status: DNR / DNI     Disposition: Expected discharge in 2-3days     Monique Santos MD  [phone] (P)      Interval History   C/o being thirsty. SOB +. No  chest pain. No f/c     -Data reviewed today: I reviewed all new labs and imaging results over the last 24 hours. I personally reviewed no images or EKG's today.    Physical Exam   Temp: 98  F (36.7  C) Temp src: Axillary BP: 117/62 Pulse: 75   Resp: 22 SpO2: 93 % O2 Device: Oxymask Oxygen Delivery: 10 LPM  Vitals:    10/31/20 0640   Weight: 43.6 kg (96 lb 3.2 oz)     Vital Signs with Ranges  Temp:  [97.2  F (36.2  C)-98  F (36.7  C)] 98  F (36.7  C)  Pulse:  [] 75  Resp:  [20-26] 22  BP: (116-150)/() 117/62  SpO2:  [92 %-100 %] 93 %  No intake/output data recorded.    Constitutional: Awake, alert, cooperative, no apparent distress  Respiratory: bibasilar crackles heard on auscultation   Cardiovascular: Regular rate and rhythm, normal S1 and S2, and no   murmur noted  GI: Normal bowel sounds, soft, non-distended, non-tender  Skin/Integumen: No rashes, no cyanosis, no edema  Other:     Medications       furosemide  20 mg Intravenous Q12H     pantoprazole (PROTONIX) IV  40 mg Intravenous BID     sodium chloride (PF)  3 mL Intracatheter Q8H       Data   Recent Labs   Lab 10/31/20  0917 10/31/20  0621 10/31/20  0031 10/30/20  1555   WBC  --  10.6  --  7.5   HGB  --  8.1* 8.2* 8.2*   MCV  --  101*  --  104*   PLT  --  235  --  261   NA  --  146*  --  144   POTASSIUM  --  4.3  --  3.8   CHLORIDE  --  114*  --  111*   CO2  --  18*  --  17*   BUN  --  45*  --  33*   CR  --  2.34*  --  1.67*   ANIONGAP  --  14  --  16*   MK  --  7.0*  --  6.8*   GLC  --  112*  --  164*   ALBUMIN  --  2.7*  --  2.8*   PROTTOTAL  --  6.0*  --  6.2*   BILITOTAL  --  0.8  --  1.1   ALKPHOS  --  224*  --  243*   ALT  --  324*  --  125*   AST  --  701*  --  213*   LIPASE  --   --   --  164   TROPI 2.554* 2.349* 2.164* 1.379*       Recent Results (from the past 24 hour(s))   XR Chest Port 1 View    Narrative    CHEST ONE VIEW  10/30/2020 4:54 PM     HISTORY: Shortness of breath.    COMPARISON: 10/5/2020      Impression    IMPRESSION: Markedly enlarged cardiac silhouette is increased in size  from previous, question a pericardial effusion. Minimal left base  atelectasis and/or infiltrate.    DANA MICHEL MD   POC US ECHO LIMITED    Impression    Limited Bedside Cardiac Ultrasound, performed and interpreted by me.   Indication: Shortness of Breath.  Parasternal long axis, parasternal short axis and apical 4 chamber views were acquired.   Image quality was satisfactory.    Findings:    There is no evidence of free fluid within the pericardium. Chambers appear dilated and with poor function, poor squeeze.     IMPRESSION: Abnormal limited cardiac ultrasound showing poor wall motion, poor cardiac function.  No pericardial effusion.

## 2020-10-31 NOTE — PROVIDER NOTIFICATION
"MD Notification    Notified Person: MD    Notified Person Name: Danielle    Notification Date/Time:  10/31/20 4:12 PM     Notification Interaction: text page    Purpose of Notification: \"Now that pt is eating should we try PO pain med instead of IV dilaudid? Maybe small dose oxycodone? Please advise. Thanks.\"    Orders Received:    Comments:      "

## 2020-11-01 PROBLEM — R60.0 BILATERAL LOWER EXTREMITY EDEMA: Chronic | Status: ACTIVE | Noted: 2020-01-01

## 2020-11-01 NOTE — PROGRESS NOTES
Preceptor Attestation:   Patient seen, evaluated and discussed with the resident. I have verified the content of the note, which accurately reflects my assessment of the patient and the plan of care.   Supervising Physician:  Ángela Haque, DO

## 2020-11-01 NOTE — DEATH PRONOUNCEMENT
MD DEATH PRONOUNCEMENT    Called to pronounce Jatin In Ayala dead.    Physical Exam: Unresponsive to noxious stimuli, Spontaneous respirations absent, Breath sounds absent, Carotid pulse absent, Heart sounds absent, Pupillary light reflex absent and Corneal blink reflex absent    Patient was pronounced dead at 0325 AM, November 1, 2020.    Preliminary Cause of Death: hypoxic respiratory failure, CHF, ACS, Kidney failure     Active Problems:    CHF (congestive heart failure) (H)       Infectious disease present?: NO    Communicable disease present? (examples: HIV, chicken pox, TB, Ebola, CJD) :  NO    Multi-drug resistant organism present? (example: MRSA): NO      Nesha Staley MD

## 2020-11-01 NOTE — CONSULTS
[facility]    Cardiology Consultation     Date of Admission:  10/30/2020    Assessment & Plan   Jatin Ayala is a 89 year old female who was admitted on 10/30/2020. I was asked to see the patient for CHF exacerbation.      Patient was recently hospitalized for malnutrition and failure to thrive in the setting of her dementia.  She was briefly on hospice but then taken off of hospice due to some improvement in her appetite.  She presented with dyspnea with hypoxia to 81%.  Lactate was elevated and BNP was significantly elevated.  Chest x-ray was concerning for enlarged cardiac silhouette.  She complained of abdominal pain but is a poor historian.    Most recent laboratory results note a troponin that is elevated but relatively stable at 2.1, 2.3, 2.5.  Creatinine is elevated at 2.3, lactic acid is improving down to 5.7 from 8.5.     Covid testing is negative.    EKG does not note changes consistent with STEMI    Problems:  Heart failure preserved ejection fraction  NSTEMI, suspect type II  Hypertension  Abdominal pain, prolonged history with negative recent CT scan  CKD stage III  Acute injury  Admission for failure to thrive in the setting of dementia, recently on hospice    Plan:  Patient has had a echocardiogram recently with a normal LVEF.  Me will need to repeat her echocardiogram to see if there is been a change.  This is pending.    I would not heparinize the patient in the setting of a possible history of GI bleeding.    I would suspect that medical management would be best here given that she was recently on hospice.  We will need to liaise with the family to ensure that this is still her goals of care.  The patient does not seem to be able to provide much history.    The patient does not appear to be floridly volume overloaded at this time.  I will stop her IV diuresis and we can transition to oral diuresis tomorrow.    Agree with goals of care discussion and palliative care consult  if possible as an inpatient.    I do note that the patient has a quite labile blood pressure.  I think it be reasonable to tolerate blood pressures as high as even 140-150 systolic to ensure against the risk of falls given the patient's very advanced age and dementia and frailty.      Ilan Urias MD     Code Status    No CPR- Do NOT Intubate    Reason for Consult   Reason for consult: Shortness of breath    Primary Care Physician   Jesica Ayala    Chief Complaint   Shortness of breath    History is obtained from the patient    History of Present Illness   Jatin Ayala is a 89 year old female who was admitted on 10/30/2020. I was asked to see the patient for CHF exacerbation.      Patient was recently hospitalized for malnutrition and failure to thrive in the setting of her dementia.  She was briefly on hospice but then taken off of hospice due to some improvement in her appetite.  She presented with dyspnea with hypoxia to 81%.  Lactate was elevated and BNP was significantly elevated.  Chest x-ray was concerning for enlarged cardiac silhouette.  She complained of abdominal pain but is a poor historian.    Most recent laboratory results note a troponin that is elevated but relatively stable at 2.1, 2.3, 2.5.  Creatinine is elevated at 2.3, lactic acid is improving down to 5.7 from 8.5.     EKG is not consistent with STEMI    The patient is a poor historian.  She is unable to relate much history.    Past Medical History   I have reviewed this patient's medical history and updated it with pertinent information if needed.   Past Medical History:   Diagnosis Date     CKD (chronic kidney disease) stage 3, GFR 30-59 ml/min      Gastroesophageal reflux disease      Hypertension        Past Surgical History   I have reviewed this patient's surgical history and updated it with pertinent information if needed.  Past Surgical History:   Procedure Laterality Date     CATARACT IOL, RT/LT       REPAIR PTOSIS          Prior to Admission Medications   Prior to Admission Medications   Prescriptions Last Dose Informant Patient Reported? Taking?   HYPROMELLOSE-DEXTRAN 0.3-0.1% opthalmic solution 10/30/2020 at am  No Yes   Sig: INSTILL TWO DROPS INTO BOTH EYES EVERY 6 HOURS IF NEEDED FOR DRY EYES.   KLOR-CON 10 MEQ CR tablet 10/30/2020 at am  Yes Yes   Sig: TAKE 2 TABLETS (20 MEQ) BY MOUTH DAILY   LANsoprazole (PREVACID SOLUTAB) 30 MG ODT 10/30/2020 at 1200  No Yes   Sig: Take 1 tablet (30 mg) by mouth daily   Lidocaine (LIDOCARE) 4 % Patch 10/30/2020 at am  Yes Yes   Sig: Place 1 patch onto the skin every 24 hours To prevent lidocaine toxicity, patient should be patch free for 12 hrs daily.   Nutritional Supplements (ENSURE ORIGINAL) LIQD 10/30/2020 at am  No Yes   Sig: Take 1 Bottle by mouth 3 times daily (with meals)   Trolamine Salicylate (ASPERCREME) 10 % LOTN Past Month at Unknown time  Yes Yes   Sig: Externally apply topically 4 times daily as needed (knee, hip, shoulder pain)    acetaminophen (TYLENOL) 325 MG tablet 10/30/2020 at Unknown time  No Yes   Sig: Take 1-2 tablets (325-650 mg) by mouth every 6 hours as needed for mild pain   alum & mag hydroxide-simethicone (MAALOX  ES) 400-400-40 MG/5ML SUSP suspension More than a month at Unknown time  No No   Sig: Take 30 mLs by mouth every 4 hours as needed for indigestion or heartburn   bismuth subsalicylate (PEPTO BISMOL) 262 MG/15ML suspension 10/30/2020 at Unknown time  No Yes   Sig: Take 30 mLs by mouth every 6 hours as needed for indigestion or diarrhea   diclofenac (VOLTAREN) 1 % topical gel Past Week at Unknown time  Yes Yes   Sig: Place 2 g onto the skin 2 times daily Apply to knees, hips, and shoulders   famotidine (PEPCID) 10 MG tablet 10/29/2020 at am  Yes Yes   Sig: Take 10 mg by mouth daily as needed   furosemide (LASIX) 20 MG tablet 10/30/2020 at am  No Yes   Sig: Take 1 tablet (20 mg) by mouth daily   hydrochlorothiazide (HYDRODIURIL) 12.5 MG tablet  10/30/2020 at am  Yes Yes   Sig: Take by mouth every morning    loratadine (CLARITIN) 10 MG tablet 10/30/2020 at am  No Yes   Sig: Take 1 tablet (10 mg) by mouth daily   polyethylene glycol (MIRALAX) 17 GM/Dose powder More than a month at Unknown time  No No   Sig: Take 17 g (1 capful) by mouth daily as needed   polyethylene glycol-propylene glycol (SYSTANE ULTRA) 0.4-0.3 % SOLN ophthalmic solution 10/30/2020 at am  No Yes   Sig: Place 1 drop into both eyes 4 times daily as needed for dry eyes      Facility-Administered Medications: None     Allergies   No Known Allergies    Social History   I have reviewed this patient's social history and updated it with pertinent information if needed. Jatin Ayala  reports that she has never smoked. She has never used smokeless tobacco. She reports previous alcohol use. She reports previous drug use.    Family History   I have reviewed this patient's family history and updated it with pertinent information if needed.   Family History   Problem Relation Age of Onset     Glaucoma No family hx of      Macular Degeneration No family hx of        Review of Systems   Review of systems not obtained due to patient factors - confusion    Physical Exam   Temp: 98  F (36.7  C) Temp src: Axillary BP: 117/62 Pulse: 75   Resp: 22 SpO2: 93 % O2 Device: Oxymask Oxygen Delivery: 10 LPM  Vital Signs with Ranges  Temp:  [97.3  F (36.3  C)-98  F (36.7  C)] 98  F (36.7  C)  Pulse:  [] 75  Resp:  [20-24] 22  BP: (116-150)/(62-92) 117/62  SpO2:  [93 %-100 %] 93 %  96 lbs 3.2 oz    GENERAL APPEARANCE: Alert and oriented x3. No acute distress.  SKIN: Inspection of the skin reveals no rashes, ulcerations, warm, dry  NECK: Supple and symmetric.   Mildly elevated JVP  LUNGS: Clear breath sounds throughout bilaterally, no wheezes, no rhonchi  CARDIOVASCULAR: S1, S2, regular rate and rhythm without any murmurs, gallops, rubs. The carotid pulses were normal and 2+ bilaterally without bruits. Peripheral  pulses were 2+ and symmetric.  ABDOMEN: Soft, non-tender, non-distended with normal bowel sounds.  No ascites noted.  EXTREMITIES: No edema.  NEUROLOGIC:  Normal mood and affect.  Sensation to touch was normal.      Data   Results for orders placed or performed during the hospital encounter of 10/30/20 (from the past 24 hour(s))   Troponin I   Result Value Ref Range    Troponin I ES 2.164 (HH) 0.000 - 0.045 ug/L   Lactic acid whole blood   Result Value Ref Range    Lactic Acid 8.5 (HH) 0.7 - 2.0 mmol/L   Hemoglobin   Result Value Ref Range    Hemoglobin 8.2 (L) 11.7 - 15.7 g/dL   Comprehensive metabolic panel   Result Value Ref Range    Sodium 146 (H) 133 - 144 mmol/L    Potassium 4.3 3.4 - 5.3 mmol/L    Chloride 114 (H) 94 - 109 mmol/L    Carbon Dioxide 18 (L) 20 - 32 mmol/L    Anion Gap 14 3 - 14 mmol/L    Glucose 112 (H) 70 - 99 mg/dL    Urea Nitrogen 45 (H) 7 - 30 mg/dL    Creatinine 2.34 (H) 0.52 - 1.04 mg/dL    GFR Estimate 17 (L) >60 mL/min/[1.73_m2]    GFR Estimate If Black 20 (L) >60 mL/min/[1.73_m2]    Calcium 7.0 (L) 8.5 - 10.1 mg/dL    Bilirubin Total 0.8 0.2 - 1.3 mg/dL    Albumin 2.7 (L) 3.4 - 5.0 g/dL    Protein Total 6.0 (L) 6.8 - 8.8 g/dL    Alkaline Phosphatase 224 (H) 40 - 150 U/L     (H) 0 - 50 U/L     (HH) 0 - 45 U/L   CBC with platelets   Result Value Ref Range    WBC 10.6 4.0 - 11.0 10e9/L    RBC Count 2.58 (L) 3.8 - 5.2 10e12/L    Hemoglobin 8.1 (L) 11.7 - 15.7 g/dL    Hematocrit 26.0 (L) 35.0 - 47.0 %     (H) 78 - 100 fl    MCH 31.4 26.5 - 33.0 pg    MCHC 31.2 (L) 31.5 - 36.5 g/dL    RDW 22.1 (H) 10.0 - 15.0 %    Platelet Count 235 150 - 450 10e9/L   Lactic acid whole blood   Result Value Ref Range    Lactic Acid 5.7 (HH) 0.7 - 2.0 mmol/L   Troponin I   Result Value Ref Range    Troponin I ES 2.349 (HH) 0.000 - 0.045 ug/L   Troponin I   Result Value Ref Range    Troponin I ES 2.554 (HH) 0.000 - 0.045 ug/L   Hemoglobin   Result Value Ref Range    Hemoglobin 8.6 (L) 11.7 -  15.7 g/dL

## 2020-11-01 NOTE — DISCHARGE SUMMARY
[facility]  Discharge Summary        Jatin Ayala MRN# [id number]   YOB: 1927 Age: 89 year old     Date of Admission:  10/30/2020  Date of Discharge:  11/1/2020  Admitting Physician:  Monique Santos MD  Discharge Physician: Monique Santos MD  Discharging Service: Hospitalist     Primary Provider: Jesica Ayala  Primary Care Physician Phone Number: [phone]         Discharge Diagnoses/Problem Oriented Hospital Course (Providers):    Jatin Ayala was admitted on 10/30/2020 by Monique Santos MD and I would refer you to their history and physical.  The following problems were addressed during her hospitalization:  Assessment & Plan     Jatin Ayala is a 89 year old female who was admitted on 10/30/2020.  Assessment & Plan     Jatin Ayala is a 89 year old female who presents with shortness of breath     Symptomatic COVID-19 sent, low suspicion     CHF, suspect acute on chronic diastolic exacerbation  NSTEMI  H/o labile hypertension;  Ruled out for COVID-19  89-year-old female with history malnutrition and failure to thrive and recent frequent hospitalizations was brought in to [facility] from [facility] with ongoing hypoxia and respiratory failure secondary to acute diastolic congestive heart failure exacerbation as chest x-ray showed marked enlargement of the cardiac silhouette out.  BNP was greater than 35,000 lactate was found to be elevated at 8.4 she was given Lasix IV and was admitted to the hospital  Patient refused BiPAP on admission  Supplemental oxygen provided and as needed albuterol nebs  Her blood pressure was labile also her lisinopril and beta-blockers were held because of marked wheezing on exam on admission  She was started on Lasix 20 mg IV twice daily for gentle diuresis  Cardiology consultation requested  We are planning on repeating another echocardiogram on her  Overnight her heart rate dropped she became bradycardic when the nurse checked on  her she has no pulse and was unresponsive.  She was DNR/DNI so no code was called and that she passed away comfortably in her sleep  Concern for GI Bleed  Anemia  Abdominal pain  Elevated LFT's  Worsening PO intake as well as abdominal pain and vomiting after eating. AST/ALT at 213/125, alk phos at 243. WBC normal. Lipase normal. Hgb at discharge 10.4 on 10/6, admit at 8.2. unknown if bleeding at this time, reported positive FITT test in the past. Possible GI bleed vs dilutional from acute CHF.  - serial hgbs  - pantoprazole 40 mg IV BID   - prn hydromorphone low dose  No active bleeding noted   hgb stable at 8.0   Patient is      DALILA  Anion gap acidosis  CKD III  Baseline ~1.2 (but unclear). Creatinine at 1.67 at the time of admission. Given above, leading suspicion is for decompensated CHF causing DALILA  - avoid nephrotoxins    Patient is      Severe malnutrition  Dementia  Failure to thrive  Goals of care  [mirtazapine 15 mg at bedtime, apparently not using]  Recent hospitalization early 10/2 to 10/8/2020  for failure to thrive and delirium. With markedly poor BMI and poor PO intake. The plan at the time of discharge was to go home with hospice cares. Apparently improved and eating much better so hospice revoked. [place] been living in [place] but needs a lot of cares/ assistance. Per ED note 10/30 daughter currently in agreement with medical management at this time and trying to keep patient comfortable.   - pending above eval, may benefit from palliative consult    Patient is      Chronic osteomyelitis R toe  Was followed by ID, completed course of abx on 10/7 (levo/doxy).   - no acute issues    Patient is         DVT Prophylaxis: Pneumatic Compression Devices  Code Status: DNR / DNI    Discharge to  home    Monique Santos MD   Page [phone](7AM-6PM)            Code Status:      DNR / DNI        Brief Hospital Stay Summary Sent Home With Patient in AVS:               Important  Results:      See below         Pending Results:        Unresulted Labs Ordered in the Past 30 Days of this Admission     Date and Time Order Name Status Description    10/30/2020 1529 Blood Culture ONE site Preliminary             Discharge Instructions and Follow-Up:            Discharge Disposition:      Discharged to home        Discharge Medications:        Discharge Medication List as of 2020  7:26 AM      NONE as pt is                                                                                                                                  Allergies:       No Known Allergies        Consultations This Hospital Stay:      Consultation during this admission received from cardiology                Discharge Time:      Less  than 30 minutes.        Image Results From This Hospital Stay (For Non-EPIC Providers):        Results for orders placed or performed during the hospital encounter of 10/30/20   XR Chest Port 1 View    Narrative    CHEST ONE VIEW  10/30/2020 4:54 PM     HISTORY: Shortness of breath.    COMPARISON: 10/5/2020      Impression    IMPRESSION: Markedly enlarged cardiac silhouette is increased in size  from previous, question a pericardial effusion. Minimal left base  atelectasis and/or infiltrate.    DANA MICHEL MD   POC US ECHO LIMITED    Impression    Limited Bedside Cardiac Ultrasound, performed and interpreted by me.   Indication: Shortness of Breath.  Parasternal long axis, parasternal short axis and apical 4 chamber views were acquired.   Image quality was satisfactory.    Findings:    There is no evidence of free fluid within the pericardium. Chambers appear dilated and with poor function, poor squeeze.     IMPRESSION: Abnormal limited cardiac ultrasound showing poor wall motion, poor cardiac function.  No pericardial effusion.                   Most Recent Lab Results In EPIC (For Non-EPIC Providers):    Most Recent 3 CBC's:  Recent Labs   Lab Test 10/31/20  7707  10/31/20  1541 10/31/20  0621 10/30/20  1555 10/30/20  1555 10/06/20  0612   WBC  --   --  10.6  --  7.5 4.9   HGB 8.3* 8.6* 8.1*   < > 8.2* 10.4*   MCV  --   --  101*  --  104* 90   PLT  --   --  235  --  261 93*    < > = values in this interval not displayed.      Most Recent 3 BMP's:  Recent Labs   Lab Test 10/31/20  0621 10/30/20  1555 10/06/20  0612   * 144 138   POTASSIUM 4.3 3.8 3.8   CHLORIDE 114* 111* 106   CO2 18* 17* 27   BUN 45* 33* 12   CR 2.34* 1.67* 1.20*   ANIONGAP 14 16* 5   MK 7.0* 6.8* 8.1*   * 164* 85     Most Recent 3 Troponin's:  Recent Labs   Lab Test 10/31/20  0917 10/31/20  0621 10/31/20  0031   TROPI 2.554* 2.349* 2.164*     Most Recent 3 INR's:  Recent Labs   Lab Test 09/22/20  1708   INR 1.34*     Most Recent 2 LFT's:  Recent Labs   Lab Test 10/31/20  0621 10/30/20  1555   * 213*   * 125*   ALKPHOS 224* 243*   BILITOTAL 0.8 1.1     Most Recent Cholesterol Panel:No lab results found.  Most Recent 6 Bacteria Isolates From Any Culture (See EPIC Reports for Culture Details):  Recent Labs   Lab Test 10/30/20  1753 10/05/20  1811 08/14/20  1157   CULT No growth after 2 days No growth  No growth No anaerobes isolated  Heavy growth  Staphylococcus aureus  *     Most Recent TSH, T4 and HgbA1c:   Recent Labs   Lab Test 10/02/20  1049   TSH 1.78

## 2020-11-01 NOTE — PROGRESS NOTES
Patient telemetry alarmed low heart rate, entered patient room and discovered that she was no longer breathing and she was without significant vital signs 0325.  Paged house MD.

## 2020-11-01 NOTE — PROGRESS NOTES
[facility] Progress Note  [facility]    On Call request to be with family upon Ptnt death.    I offered reflective listening, emotional support, invited storytelling, encouraged self care, and performed a blessing ritual and prayers.      Jayshree Kay  Chaplain Resident

## 2020-11-05 LAB
BACTERIA SPEC CULT: NO GROWTH
SPECIMEN SOURCE: NORMAL

## 2020-11-10 ENCOUNTER — DOCUMENTATION ONLY (OUTPATIENT)
Dept: FAMILY MEDICINE | Facility: CLINIC | Age: 85
End: 2020-11-10

## 2020-11-10 NOTE — PROGRESS NOTES
11/10/20    [facility]  [phone]     Re: FMLA    Guillermina (Diana's daughter) submitted FMLA forms to our team in mid Oct 2020. I filled them out on 10/23/2020, and they were mailed out by clinic staff on 10/26/2020 via "[employer] to Kat at the address she requested. Unfortunately the forms were never received by Kat. (We suspect there was either a delay in USPS mailing (related to COVID/election?), or they were lost in some other fashion.) Regardless, Guillermina did submit forms in a timely fashion to our clinic staff, and we do support FMLA as she was the primary caregiver attending to her mother 24/7 from 10/8/2020 through 10/30/2020. On 10/30/2020 Diana was admitted to the hospital with acute hypoxic respiratory failure and she  in the early morning of 2020.     Please accept this note and the attached documentation as our support for Guillermina's qualifying for FMLA.     Thank you. Please contact us with any questions or concerns.     Sincerely,      Jesica Ayala MD

## 2022-12-09 NOTE — PROGRESS NOTES
[facility]  Progress Note        Interval Events:   No pain. Has some abdominal discomfort/suprapubic fullness. Thinking more clearly today. Still delusional about  Saving the world from covid by gathering cure from [place]. Follows goals of care conversation pretty well and communicates wishes clearly. Wants to discharge back to same facility she came from. And wants to do so by ambulance. Appetite okay today (still minimal oral intake) and tolerating w/o emesis. Last 24 hour intake was ~275kCal. No fevers. Blood pressures quite labile from 80s systolic to 180s  systolic--pt asymptomatic.     Plan today:  Severe malnutrition w/ very poor appetite. Continues to be delirious, but was quite alert and keenly oriented during goals-of-care conversation this afternoon. Currently pain-free. Content.      - SW consult for hospice.   - Meeting w/ Diana's 3 daughters to discuss goals of care: elected for hospice  - Minimize disturbances at night. (Specified in orders to not get vitals while patient sleeping at night).   - No daily labs. Okay to obtain labs if henderson change in clinical status.   - Continue w/ vitamin/mineral replacement currently. No tube feeds per family decision.   - Continue mirtazapine. Stop zoloft (long half life).   - Straight cath PRN for PVR >250cc if pt tolerates       Assessment and Plan:   Diana is a 89 year old female with acute on chronic malnutrition due to poor oral intake w/ BMI 17.6, dementia, chronic osteomyelitis (followed by ID and on doxy/levo, end date 10/7), GERD, and CKD Stage III, who was admitted from assisted living facility for delirium and remains admitted pending dispo due to increased needs for ADLs.      # Severe malnutrition w/ BMI 17.6   # Chronic osteomyelitis   # Dementia   # CKD Stage III  # Failure to thrive   10/6: Discussed overall prognosis w/ Diana's three daughters: on the order of months, not years. Certainly in different place now  "than 1 year ago, when Diana was relatively independent, and even than 2 months ago when she was still walking pretty independently w/ modest appetite. Profoundly malnourished with very low likelihood of returning to \"baseline\" from even ~2 months ago. Family all in support of proceeding w/ hospice, which SW has been consulted for.   - SW to assist w/ hospice resources/formal enrollment  - Family would like wheelchair for Diana. Will discuss w/ OT.   - Minimize disturbances at night as able. Vitals before bed and upon waking, but not during sleep unless clinically indicated.   - No daily lab draws unless patient has clinically deteriorated from current baseline.   - Ok to continue w/ IV fluids, vitamin/mineral repletion, last 2 days of antibiotics  - Straight cath for comfort  - Discontinue nutrition consult. No tube feeds per family discussion. Oral intake at will.      # Severe malnutrition w/ BMI 17.6, 10lb weight loss within past month per [facility] records seen in [facility] (91.8lbs on 9/1/2020; 80 lb on 9/21/2020), temporal wasting, decreased bone marrow production, evidenced by thrombocytopenia and anemia   # Refeeding, evidenced by rapid consumption of phos  # Failure to thrive  # At risk for Wernicke's Encephalopathy  # Secondary hyperparathyroidism d/t hypocalcemia in context of severe malnutrition. Repleted and stopped monitoring to align with goals of care.   Patient's appetite has been poor for past couple of months with minimal oral intake, associated with aging and suspected zinc deficiency, worsened by nausea/vomiting a/w taking antibiotics (not w/ every meal, but with most). Very picky eater. Poor dentition (edentulous bottom row d/t covid-related delays in denture fittings).    - 10/6: Discontinue nutrition consult as family elects to not pursue tube feeds or artificial nutrition. Continue PO intake at will. Ok to premedicate w/ zofran for nausea. Stopped electrolyte replacement protocols to minimize lab " draws. Discussed w/ family. Continue vitamin/mineral repletion.   - Thiamine per Werantony's protocol    - Replace Vitamin B, D, ADEK, calcium, multivitamin, zinc orally (Will need copper po supplementation on discharge [not available inpt].)  - Defer telemetry d/t risk of worsening delirium. Spot EKGs PRN.   - Vit D, Vit B1, Vit B2, Zinc, Copper pending    # Thrombocytopenia  # Normocytic anemia  Stable. Peripheral smear w/ normochromic normocytic anemia and thrombocytopenia w/o other morphologic abnormalities. Due to severe malnutrition c/b bone marrow hypoproliferation.  Retic count low. Stable at this time. Does have history of positive FITT test, but CTAP during September admission w/o any signs concerning for colon cancer, so much less likely.      # Dementia and depression  Initially admitted 10/2 for delirium. Work-up has been largely negative, except for profound malnutrition. Have discussed daughters who agree that this is likely pseudodementia on her chroinc dementia. Zoloft at 50 mg daily has not shown any benefit (per her daughters), and she is not eating much at all.   - 10/2: Decrease zoloft from 50mg daily to 25 mg daily; start mirtazapine 7.5mg dailiy   - 10/6: Stop zoloft. Continue mirtazapine 7.5mg daily. Ok to further increase if desired.   - Delirium precautions    # Urinary retention  PVR on 10/6 was 360mL. Discussed w/ daughter and put in order for straight cath if PVR >250cc. This may help w/ abdominal discomfort and w/ delirium.     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 Echo stable. EKG w/ twave inversion in manjit-lateral leads of unclear significance. Very low suspicion for acute coronary syndrome at this time. Monitor clinically.     Chronic:   - Osteomyelitis Doing well on Doxy/Levo with end date of 10/7. Do not feel levo is contributing to delirium as she started this on 9/11. Infection stable. Continue doxy and levo. RN  "to please wrap toe osteo w/ lamb's wool daily.      - CKD III: Baseline ~1.2 (but unclear). Creatinine on admission was 1.37, from 1.36 on recent discharge. 1.03 on 10/4. 1.2 on 10/6 w/ increased PVR.      - HTN: Was on three drug regimen prior to last admission and was admitted with hypotension so medications were held. Not currently on any medications and BP normal to slightly hypertensive at times. Continue to monitor BP. Permissive hypertension to ~150/90.       - GERD: Continue pta H2 blocker and PPI    Diet: Combination Diet Regular Diet Adult  Calorie Counts  Snacks/Supplements Adult: Other; Boost Plus @ 10, Boost Breeze @ 2, and Gelatein @ HS. Pt may prefer oral supplements at room temperature.; Between Meals  Fluids: D5 0.45% NS + K at 50 mL/hr  DVT Prophylaxis: Pneumatic Compression Devices  Code Status: DNR / DNI    Disposition Plan   Expected discharge: 2 - 3 days, recommended to dispo pending recs from [facility] facility for hospice care.    Jesica Ayala  [facility], Pager: [phone]    Physical Exam:   Vital Signs: Temp: 96.2  F (35.7  C) Temp src: Oral BP: 123/61 Pulse: 88   Resp: 17 SpO2: 96 % O2 Device: None (Room air)    Weight: 81 lbs 9.12 oz  Laying back in bed and appears comfortable. Wearing purple sweater and pink butterfly scarf.  Still w/ delusions about saving world from coronavirus, but not perseverating on it so much. Contributes meaningfully to conversation and expresses desire to \"go home.\" \"I have done my work.\" No pain. Speaks clearly in both English  (to me) and [place] (to one of her daughters on the phone).   Lungs clear. No cough.   Heart RRR w/o murmur.   Very thin. Emaciated w/ temporal wasting and hypothenar atrophy.   Lesion on 2nd toe (osteo) right foot appears stable.   Abdomen thin and soft.   No LE edema.  Non-toxic.     Pertinent Labs:   Results for orders placed or performed during the hospital encounter of 10/02/20 (from the past " 24 hour(s))   EKG 12-lead, complete   Result Value Ref Range    Interpretation ECG Click View Image link to view waveform and result    Blood culture    Specimen: Blood    Left Arm   Result Value Ref Range    Specimen Description Blood Left Arm     Culture Micro No growth after 18 hours    Blood culture    Specimen: Blood    Left Hand   Result Value Ref Range    Specimen Description Blood Left Hand     Culture Micro No growth after 18 hours    Procalcitonin   Result Value Ref Range    Procalcitonin <0.05 ng/ml   Troponin I   Result Value Ref Range    Troponin I ES 0.043 0.000 - 0.045 ug/L   Lactic acid whole blood   Result Value Ref Range    Lactic Acid 2.7 (H) 0.7 - 2.0 mmol/L   XR Chest 2 Views    Narrative    XR CHEST 2 VW  10/5/2020 6:49 PM      HISTORY: Sepsis    COMPARISON: Chest x-ray 1417, chest CT 3/31/2006    TECHNIQUE: Frontal and lateral views of the chest.    FINDINGS: Bibasilar streaky opacities.. Calcified granuloma left lower  lobe stable from previous CT. No pneumothorax or significant pleural  effusion. Cardiac mediastinal silhouette is unchanged. Trachea is  midline. Surgical clips in the upper abdomen. Heavy aortic  calcification. Chronic appearing right-sided rib fracture.  Multiple  compression fractures of the thoracic spine most pronounced at  approximately T4 with greater than 50% height loss as well as multiple  others in the lower thoracic and upper lumbar spine.      Impression    IMPRESSION:   1. Bibasilar streaky opacities favored to be atelectasis.  2. Multiple thoracic compression fractures most pronounced at  approximately T4, some of which are age indeterminate. Recommend  correlation with previous outside imaging if available and patient  tenderness.    I have personally reviewed the examination and initial interpretation  and I agree with the findings.    YAYA WILLINGHAM MD   Lactic acid whole blood   Result Value Ref Range    Lactic Acid 1.7 0.7 - 2.0 mmol/L   CBC with platelets  differential   Result Value Ref Range    WBC 4.9 4.0 - 11.0 10e9/L    RBC Count 3.63 (L) 3.8 - 5.2 10e12/L    Hemoglobin 10.4 (L) 11.7 - 15.7 g/dL    Hematocrit 32.5 (L) 35.0 - 47.0 %    MCV 90 78 - 100 fl    MCH 28.7 26.5 - 33.0 pg    MCHC 32.0 31.5 - 36.5 g/dL    RDW 17.2 (H) 10.0 - 15.0 %    Platelet Count 93 (L) 150 - 450 10e9/L    Diff Method Automated Method     % Neutrophils 44.4 %    % Lymphocytes 43.8 %    % Monocytes 8.8 %    % Eosinophils 2.2 %    % Basophils 0.6 %    % Immature Granulocytes 0.2 %    Nucleated RBCs 0 0 /100    Absolute Neutrophil 2.2 1.6 - 8.3 10e9/L    Absolute Lymphocytes 2.2 0.8 - 5.3 10e9/L    Absolute Monocytes 0.4 0.0 - 1.3 10e9/L    Absolute Eosinophils 0.1 0.0 - 0.7 10e9/L    Absolute Basophils 0.0 0.0 - 0.2 10e9/L    Abs Immature Granulocytes 0.0 0 - 0.4 10e9/L    Absolute Nucleated RBC 0.0    Magnesium   Result Value Ref Range    Magnesium 1.3 (L) 1.6 - 2.3 mg/dL   Phosphorus   Result Value Ref Range    Phosphorus 3.0 2.5 - 4.5 mg/dL   Comprehensive metabolic panel   Result Value Ref Range    Sodium 138 133 - 144 mmol/L    Potassium 3.8 3.4 - 5.3 mmol/L    Chloride 106 94 - 109 mmol/L    Carbon Dioxide 27 20 - 32 mmol/L    Anion Gap 5 3 - 14 mmol/L    Glucose 85 70 - 99 mg/dL    Urea Nitrogen 12 7 - 30 mg/dL    Creatinine 1.20 (H) 0.52 - 1.04 mg/dL    GFR Estimate 39 (L) >60 mL/min/[1.73_m2]    GFR Estimate If Black 45 (L) >60 mL/min/[1.73_m2]    Calcium 8.1 (L) 8.5 - 10.1 mg/dL    Bilirubin Total 0.8 0.2 - 1.3 mg/dL    Albumin 2.3 (L) 3.4 - 5.0 g/dL    Protein Total 4.9 (L) 6.8 - 8.8 g/dL    Alkaline Phosphatase 64 40 - 150 U/L    ALT 21 0 - 50 U/L    AST 23 0 - 45 U/L   Calcium ionized whole blood   Result Value Ref Range    Calcium Ionized Whole Blood 4.8 4.4 - 5.2 mg/dL   Care Management / Social Work IP Consult    Narrative    Malathi Cortes, DEBI     10/6/2020  2:51 PM  Care Management Follow Up Note    Length of Stay (days) 4    Patient plan of care  discussed at Interdisciplinary Rounds: yes  Expected Discharge Date: 10/09/20  Concerns to be Addressed: End of life, hospice       Anticipated Discharge Disposition:  Pending family choice  Anticipated Discharge Services:  TBD    Plan:  LILIYA met with pt and pt's daughter Sharri. Introduced self and   reason for visit. Sharri stated she does not know much about   hospice services, so SW provided general overview.     Sharri explained that family will be meeting with the team   today to discuss whether they want hospice or a different goal at   discharge. In the meantime, Sharri asked LILIYA to follow up with   [facility] ([phone]) to see if they have the capacity to   take pt and for cares. She is aware that hospice will be provided   by a separate agency. Pt does have an [facility] plan, which hopefully   means she would not have the room & board costs that she would   accrue without MA.    SW contacted [facility] with request and received call back from   director London Paulino. She reports she may be able to accommodate   pt's needs but wished to speak with family for more detail. She   stated she would call pt's daughter Sharri before making a   determination.    SW will continue to remain available for patient and family   support, discharge planning, other resources and support PRN.   Primary  Hardy Mckoy will return and take case   tomorrow.    ALEIDA Aguilar, [facility]  ICU  covering 5B   [facility]   P: [phone]  Pager: [phone]            Magnesium   Result Value Ref Range    Magnesium 2.4 (H) 1.6 - 2.3 mg/dL                        Lot # (Optional): [id number] Lot # For Kenalog (Optional): [id number]

## 2024-11-21 NOTE — TELEPHONE ENCOUNTER
"Request for medication refill:    For Furosemide    Providers if patient needs an appointment and you are willing to give a one month supply please refill for one month and  send a letter/MyChart using \".SMILLIMITEDREFILL\" .smillimited and route chart to \"P SMI \" (Giving one month refill in non controlled medications is strongly recommended before denial)    If refill has been denied, meaning absolutely no refills without visit, please complete the smart phrase \".smirxrefuse\" and route it to the \"P SMI MED REFILLS\"  pool to inform the patient and the pharmacy.    Jorge Paulino MA        " Is This A New Presentation, Or A Follow-Up?: Skin Lesions How Severe Is Your Skin Lesion?: moderate Have Your Skin Lesions Been Treated?: not been treated Additional History: Patient c/o hair loss. Which Family Member (Optional)?: Grandmother